# Patient Record
Sex: MALE | Race: WHITE | NOT HISPANIC OR LATINO | Employment: OTHER | URBAN - METROPOLITAN AREA
[De-identification: names, ages, dates, MRNs, and addresses within clinical notes are randomized per-mention and may not be internally consistent; named-entity substitution may affect disease eponyms.]

---

## 2017-01-06 ENCOUNTER — ANTICOAGULATION MONITORING (OUTPATIENT)
Dept: VASCULAR LAB | Facility: MEDICAL CENTER | Age: 82
End: 2017-01-06

## 2017-01-06 DIAGNOSIS — I27.82 CHRONIC SEPTIC PULMONARY EMBOLISM WITH ACUTE COR PULMONALE (HCC): ICD-10-CM

## 2017-01-06 DIAGNOSIS — I26.01 CHRONIC SEPTIC PULMONARY EMBOLISM WITH ACUTE COR PULMONALE (HCC): ICD-10-CM

## 2017-01-06 DIAGNOSIS — I63.00 CEREBROVASCULAR ACCIDENT (CVA) DUE TO THROMBOSIS OF PRECEREBRAL ARTERY (HCC): ICD-10-CM

## 2017-01-06 LAB — INR PPP: 2.6 (ref 2–3.5)

## 2017-01-06 NOTE — PROGRESS NOTES
Anticoagulation Summary as of 1/6/2017     INR goal 2.0-3.0   Selected INR 2.6 (1/6/2017)   Maintenance plan 10 mg (5 mg x 2) on Wed, Sat; 7.5 mg (7.5 mg x 1) all other days   Weekly total 57.5 mg   Plan last modified Susy Dominique, A.P.N. (6/16/2015)   Next INR check 1/20/2017   Target end date Indefinite    Indications   Pulmonary embolism (HCC) [I26.99]  Stroke [434.91] [I63.9]         Anticoagulation Episode Summary     INR check location     Preferred lab     Send INR reminders to     Maame Ceja SA      Anticoagulation Care Providers     Provider Role Specialty Phone number    Adolfo Nguyen M.D. Referring Cardiology [phone]    Susy Dominique A.P.N. Responsible Cardiology [phone]    CHRISTIANO Darling.P.NBradley Responsible Cardiology [phone]        Spoke with Michael to report a therapeutic INR of 2.6. Pt is to continue with current warfarin dosing regimen.  Pt denies any unusual s/s of bleeding, bruising, clotting or any changes to diet or medications.  Follow up in 2 weeks.    Evie Barron, PHARMD

## 2017-01-16 DIAGNOSIS — Z79.899 HIGH RISK MEDICATION USE: ICD-10-CM

## 2017-01-16 DIAGNOSIS — E78.00 PURE HYPERCHOLESTEROLEMIA: ICD-10-CM

## 2017-01-16 DIAGNOSIS — R07.9 CHEST PAIN, UNSPECIFIED TYPE: ICD-10-CM

## 2017-01-17 ENCOUNTER — TELEPHONE (OUTPATIENT)
Dept: VASCULAR LAB | Facility: MEDICAL CENTER | Age: 82
End: 2017-01-17

## 2017-01-17 DIAGNOSIS — Z79.01 LONG TERM CURRENT USE OF ANTICOAGULANT THERAPY: ICD-10-CM

## 2017-01-17 RX ORDER — WARFARIN SODIUM 5 MG/1
TABLET ORAL
Qty: 90 TAB | Refills: 3 | Status: SHIPPED | OUTPATIENT
Start: 2017-01-17 | End: 2019-02-26 | Stop reason: SDUPTHER

## 2017-01-17 RX ORDER — WARFARIN SODIUM 7.5 MG/1
TABLET ORAL
Qty: 90 TAB | Refills: 3 | Status: SHIPPED | OUTPATIENT
Start: 2017-01-17 | End: 2018-01-19 | Stop reason: SDUPTHER

## 2017-01-17 NOTE — TELEPHONE ENCOUNTER
Received message from cardiology that pt is requesting 90 day supplies of both warfarin 5mg and 7.5mg tablets via [facility].  This was completed today.    KVNG Trimble  [facility]

## 2017-01-19 ENCOUNTER — OFFICE VISIT (OUTPATIENT)
Dept: CARDIOLOGY | Facility: MEDICAL CENTER | Age: 82
End: 2017-01-19
Payer: MEDICARE

## 2017-01-19 VITALS
BODY MASS INDEX: 32.34 KG/M2 | DIASTOLIC BLOOD PRESSURE: 70 MMHG | HEIGHT: 71 IN | SYSTOLIC BLOOD PRESSURE: 100 MMHG | HEART RATE: 76 BPM | OXYGEN SATURATION: 92 % | WEIGHT: 231 LBS

## 2017-01-19 DIAGNOSIS — R07.2 PRECORDIAL PAIN: ICD-10-CM

## 2017-01-19 DIAGNOSIS — I67.9 CEREBROVASCULAR DISEASE: ICD-10-CM

## 2017-01-19 DIAGNOSIS — D68.51 FACTOR V LEIDEN (HCC): ICD-10-CM

## 2017-01-19 DIAGNOSIS — I25.9 ISCHEMIC HEART DISEASE: ICD-10-CM

## 2017-01-19 LAB
ALBUMIN SERPL-MCNC: 4.2 G/DL (ref 3.5–4.7)
ALBUMIN/GLOB SERPL: 1.7 {RATIO} (ref 1.1–2.5)
ALP SERPL-CCNC: 28 IU/L (ref 39–117)
ALT SERPL-CCNC: 18 IU/L (ref 0–44)
AST SERPL-CCNC: 18 IU/L (ref 0–40)
BILIRUB SERPL-MCNC: 0.4 MG/DL (ref 0–1.2)
BUN SERPL-MCNC: 21 MG/DL (ref 8–27)
BUN/CREAT SERPL: 19 (ref 10–22)
CALCIUM SERPL-MCNC: 9.6 MG/DL (ref 8.6–10.2)
CHLORIDE SERPL-SCNC: 102 MMOL/L (ref 96–106)
CHOLEST SERPL-MCNC: 174 MG/DL (ref 100–199)
CO2 SERPL-SCNC: 26 MMOL/L (ref 18–29)
COMMENT 011824: ABNORMAL
CREAT SERPL-MCNC: 1.13 MG/DL (ref 0.76–1.27)
GLOBULIN SER CALC-MCNC: 2.5 G/DL (ref 1.5–4.5)
GLUCOSE SERPL-MCNC: 101 MG/DL (ref 65–99)
HDLC SERPL-MCNC: 38 MG/DL
LDLC SERPL CALC-MCNC: 109 MG/DL (ref 0–99)
POTASSIUM SERPL-SCNC: 4.3 MMOL/L (ref 3.5–5.2)
PROT SERPL-MCNC: 6.7 G/DL (ref 6–8.5)
SODIUM SERPL-SCNC: 142 MMOL/L (ref 134–144)
TRIGL SERPL-MCNC: 134 MG/DL (ref 0–149)
VLDLC SERPL CALC-MCNC: 27 MG/DL (ref 5–40)

## 2017-01-19 PROCEDURE — 99214 OFFICE O/P EST MOD 30 MIN: CPT | Performed by: INTERNAL MEDICINE

## 2017-01-19 PROCEDURE — 93000 ELECTROCARDIOGRAM COMPLETE: CPT | Performed by: INTERNAL MEDICINE

## 2017-01-19 RX ORDER — POTASSIUM CHLORIDE 750 MG/1
10 TABLET, FILM COATED, EXTENDED RELEASE ORAL DAILY
COMMUNITY
End: 2017-12-14

## 2017-01-19 RX ORDER — HYDROCHLOROTHIAZIDE 25 MG/1
25 TABLET ORAL DAILY
COMMUNITY
End: 2017-12-14

## 2017-01-19 ASSESSMENT — ENCOUNTER SYMPTOMS
HALLUCINATIONS: 0
PALPITATIONS: 0
SPEECH CHANGE: 0
FALLS: 0
BLURRED VISION: 0
WEIGHT LOSS: 0
VOMITING: 0
NAUSEA: 0
BRUISES/BLEEDS EASILY: 0
DIZZINESS: 0
CLAUDICATION: 0
LOSS OF CONSCIOUSNESS: 0
BLOOD IN STOOL: 0
PND: 0
COUGH: 0
ABDOMINAL PAIN: 0
SENSORY CHANGE: 0
SHORTNESS OF BREATH: 0
CHILLS: 0
DOUBLE VISION: 0
MYALGIAS: 0
ORTHOPNEA: 0
HEADACHES: 0
DEPRESSION: 0
EYE PAIN: 0
FEVER: 0
EYE DISCHARGE: 0

## 2017-01-19 NOTE — PROGRESS NOTES
Subjective:   Michael Lockhart is a 81 y.o. male who presents today for cardiac care of CAD (MI in 2003), HTN, HLP, Factor V Leiden, history of PE.     In the interim, patient has been feeling some chest pain at random times. Pressure-like sensation. No shortness of breath.    Still chronic dizziness due to Meniere's disease.     TRANSTHORACIC ECHOCARDIOGRAM 2014:  CONCLUSIONS  Normal left ventricular systolic function.  Moderate left ventricular hypertrophy.  Grade II diastolic dysfunction.  Moderately dilated right atrium.  Trace mitral regurgitation.  Aortic annular calcification.  Mild tricuspid regurgitation.        Past Medical History   Diagnosis Date   • Ischemic heart disease    • Pulmonary embolism (CMS-HCC)      Complication of cardiac catherization   • Chest pain, unspecified      History of recurrent chest pain withput evidence of acive ischemia by thallium   • Long term (current) use of anticoagulants    • Primary hypercoagulable state (CMS-HCC)      History of positive Factor V Leiden   • Other general symptoms      Fatigue with low blood pressure   • Headache(784.0)      Chronic   • Herpes zoster without mention of complication    • Pure hypercholesterolemia    • Other abnormal glucose    • Rotator cuff (capsule) sprain    • Acute, but ill-defined, cerebrovascular disease    • CAD (coronary artery disease)    • Ischemic cardiomyopathy    • Valvular heart disease      History reviewed. No pertinent past surgical history.  History reviewed. No pertinent family history.  History   Smoking status   • Never Smoker    Smokeless tobacco   • Never Used     Allergies   Allergen Reactions   • Finasteride Swelling     Hand swelling, stopped medications after taking for three days   • Zetia [Ezetimibe]      N/V     Outpatient Encounter Prescriptions as of 1/19/2017   Medication Sig Dispense Refill   • hydrochlorothiazide (HYDRODIURIL) 25 MG Tab Take 25 mg by mouth every day.     • potassium chloride ER (KLOR-CON)  "10 MEQ tablet Take 10 mEq by mouth every day.     • warfarin (COUMADIN) 5 MG Tab Take 1-2 tabs po q day or as directed by clinic 90 Tab 3   • warfarin (COUMADIN) 7.5 MG Tab Take 1 Tab by mouth every day or as directed by clinic 90 Tab 3   • pravastatin (PRAVACHOL) 20 MG Tab Take 1 Tab by mouth 2 Times a Day. (Patient taking differently: Take 20 mg by mouth every day. (Reduced to 1 tab for one month now, eating coconut oil daily)) 180 Tab 2   • lisinopril (PRINIVIL) 5 MG TABS Take 5 mg by mouth as needed.     • gabapentin (NEURONTIN) 100 MG CAPS Take 200 mg by mouth 2 Times a Day.     • vitamin D (CHOLECALCIFEROL) 1000 UNIT TABS Take 5,000 Units by mouth every day.     • B Complex Vitamins (VITAMIN B COMPLEX PO) Take 1 Tab by mouth every day.     • tamsulosin (FLOMAX) 0.4 MG capsule Take 0.4 mg by mouth every bedtime.       No facility-administered encounter medications on file as of 1/19/2017.     Review of Systems   Constitutional: Negative for fever, chills, weight loss and malaise/fatigue.   HENT: Negative for ear discharge, ear pain, hearing loss and nosebleeds.    Eyes: Negative for blurred vision, double vision, pain and discharge.   Respiratory: Negative for cough and shortness of breath.    Cardiovascular: Negative for chest pain, palpitations, orthopnea, claudication, leg swelling and PND.   Gastrointestinal: Negative for nausea, vomiting, abdominal pain, blood in stool and melena.   Genitourinary: Negative for dysuria and hematuria.   Musculoskeletal: Negative for myalgias, joint pain and falls.   Skin: Negative for itching and rash.   Neurological: Negative for dizziness, sensory change, speech change, loss of consciousness and headaches.   Endo/Heme/Allergies: Negative for environmental allergies. Does not bruise/bleed easily.   Psychiatric/Behavioral: Negative for depression, suicidal ideas and hallucinations.        Objective:   /70 mmHg  Pulse 76  Ht 1.803 m (5' 10.98\")  Wt 104.781 kg (231 " lb)  BMI 32.23 kg/m2  SpO2 92%    Physical Exam   Constitutional: He is oriented to person, place, and time. No distress.   HENT:   Head: Normocephalic and atraumatic.   Eyes: EOM are normal.   Neck: Normal range of motion. No JVD present.   Cardiovascular: Normal rate, regular rhythm, normal heart sounds and intact distal pulses.  Exam reveals no gallop and no friction rub.    No murmur heard.  Bilateral femoral pulses are 2+, bilateral dorsalis pedis pulses are 2+, bilateral posterior tibialis pulses are 2+.   Pulmonary/Chest: No respiratory distress. He has no wheezes. He has no rales. He exhibits no tenderness.   Abdominal: Soft. Bowel sounds are normal. There is no tenderness. There is no rebound and no guarding.   The is no presence of abdominal bruits   Musculoskeletal: Normal range of motion. He exhibits no edema or tenderness.   Neurological: He is alert and oriented to person, place, and time.   Skin: Skin is warm and dry.   Psychiatric: He has a normal mood and affect.   Nursing note and vitals reviewed.      Assessment:     1. Ischemic heart disease  PET SCAN MYOCARDIAL PERFUSION    [facility] EP[facility]ANY EKG (Clinic Performed)   2. Factor V Leiden (CMS-HCC)  PET SCAN MYOCARDIAL PERFUSION    [facility] EKG (Clinic Performed)   3. Cerebrovascular disease  PET SCAN MYOCARDIAL PERFUSION    [facility] EP[facility]ANY EKG (Clinic Performed)   4. Precordial pain  PET SCAN MYOCARDIAL PERFUSION    [facility] EKG (Clinic Performed)       Medical Decision Making:  Today's Assessment / Status / Plan:     I think at this time, we should further investigate with a PET myocardial stress test to assess for possible coronary ischemia.    His EKG today does not show evidence of acute coronary syndrome. I reviewed with him.    In the meantime, continue current medical therapy. Blood pressure is well controlled.

## 2017-01-19 NOTE — Clinical Note
[facility]   [address]  [place] [address]  Phone: [phone]  Fax: [phone]              Michael Lockhart  1935    Encounter Date: 1/19/2017    Cal Luong M.D.          PROGRESS NOTE:  Subjective:   Michael Lockhart is a 81 y.o. male who presents today for cardiac care of CAD (MI in 2003), HTN, HLP, Factor V Leiden, history of PE.     In the interim, patient has been feeling some chest pain at random times. Pressure-like sensation. No shortness of breath.    Still chronic dizziness due to Meniere's disease.     TRANSTHORACIC ECHOCARDIOGRAM 2014:  CONCLUSIONS  Normal left ventricular systolic function.  Moderate left ventricular hypertrophy.  Grade II diastolic dysfunction.  Moderately dilated right atrium.  Trace mitral regurgitation.  Aortic annular calcification.  Mild tricuspid regurgitation.        Past Medical History   Diagnosis Date   • Ischemic heart disease    • Pulmonary embolism (CMS-HCC)      Complication of cardiac catherization   • Chest pain, unspecified      History of recurrent chest pain withput evidence of acive ischemia by thallium   • Long term (current) use of anticoagulants    • Primary hypercoagulable state (CMS-HCC)      History of positive Factor V Leiden   • Other general symptoms      Fatigue with low blood pressure   • Headache(784.0)      Chronic   • Herpes zoster without mention of complication    • Pure hypercholesterolemia    • Other abnormal glucose    • Rotator cuff (capsule) sprain    • Acute, but ill-defined, cerebrovascular disease    • CAD (coronary artery disease)    • Ischemic cardiomyopathy    • Valvular heart disease      History reviewed. No pertinent past surgical history.  History reviewed. No pertinent family history.  History   Smoking status   • Never Smoker    Smokeless tobacco   • Never Used     Allergies   Allergen Reactions   • Finasteride Swelling     Hand swelling, stopped medications  after taking for three days   • Zetia [Ezetimibe]      N/V     Outpatient Encounter Prescriptions as of 1/19/2017   Medication Sig Dispense Refill   • hydrochlorothiazide (HYDRODIURIL) 25 MG Tab Take 25 mg by mouth every day.     • potassium chloride ER (KLOR-CON) 10 MEQ tablet Take 10 mEq by mouth every day.     • warfarin (COUMADIN) 5 MG Tab Take 1-2 tabs po q day or as directed by clinic 90 Tab 3   • warfarin (COUMADIN) 7.5 MG Tab Take 1 Tab by mouth every day or as directed by clinic 90 Tab 3   • pravastatin (PRAVACHOL) 20 MG Tab Take 1 Tab by mouth 2 Times a Day. (Patient taking differently: Take 20 mg by mouth every day. (Reduced to 1 tab for one month now, eating coconut oil daily)) 180 Tab 2   • lisinopril (PRINIVIL) 5 MG TABS Take 5 mg by mouth as needed.     • gabapentin (NEURONTIN) 100 MG CAPS Take 200 mg by mouth 2 Times a Day.     • vitamin D (CHOLECALCIFEROL) 1000 UNIT TABS Take 5,000 Units by mouth every day.     • B Complex Vitamins (VITAMIN B COMPLEX PO) Take 1 Tab by mouth every day.     • tamsulosin (FLOMAX) 0.4 MG capsule Take 0.4 mg by mouth every bedtime.       No facility-administered encounter medications on file as of 1/19/2017.     Review of Systems   Constitutional: Negative for fever, chills, weight loss and malaise/fatigue.   HENT: Negative for ear discharge, ear pain, hearing loss and nosebleeds.    Eyes: Negative for blurred vision, double vision, pain and discharge.   Respiratory: Negative for cough and shortness of breath.    Cardiovascular: Negative for chest pain, palpitations, orthopnea, claudication, leg swelling and PND.   Gastrointestinal: Negative for nausea, vomiting, abdominal pain, blood in stool and melena.   Genitourinary: Negative for dysuria and hematuria.   Musculoskeletal: Negative for myalgias, joint pain and falls.   Skin: Negative for itching and rash.   Neurological: Negative for dizziness, sensory change, speech change, loss of consciousness and headaches.    "  Endo/Heme/Allergies: Negative for environmental allergies. Does not bruise/bleed easily.   Psychiatric/Behavioral: Negative for depression, suicidal ideas and hallucinations.        Objective:   /70 mmHg  Pulse 76  Ht 1.803 m (5' 10.98\")  Wt 104.781 kg (231 lb)  BMI 32.23 kg/m2  SpO2 92%    Physical Exam   Constitutional: He is oriented to person, place, and time. No distress.   HENT:   Head: Normocephalic and atraumatic.   Eyes: EOM are normal.   Neck: Normal range of motion. No JVD present.   Cardiovascular: Normal rate, regular rhythm, normal heart sounds and intact distal pulses.  Exam reveals no gallop and no friction rub.    No murmur heard.  Bilateral femoral pulses are 2+, bilateral dorsalis pedis pulses are 2+, bilateral posterior tibialis pulses are 2+.   Pulmonary/Chest: No respiratory distress. He has no wheezes. He has no rales. He exhibits no tenderness.   Abdominal: Soft. Bowel sounds are normal. There is no tenderness. There is no rebound and no guarding.   The is no presence of abdominal bruits   Musculoskeletal: Normal range of motion. He exhibits no edema or tenderness.   Neurological: He is alert and oriented to person, place, and time.   Skin: Skin is warm and dry.   Psychiatric: He has a normal mood and affect.   Nursing note and vitals reviewed.      Assessment:     1. Ischemic heart disease  PET SCAN MYOCARDIAL PERFUSION    [facility] EPIPHANY EKG (Clinic Performed)   2. Factor V Leiden (CMS-HCC)  PET SCAN MYOCARDIAL PERFUSION    [facility] EPIPHANY EKG (Clinic Performed)   3. Cerebrovascular disease  PET SCAN MYOCARDIAL PERFUSION    [facility] EPIPHANY EKG (Clinic Performed)   4. Precordial pain  PET SCAN MYOCARDIAL PERFUSION    [facility] EPIPHANY EKG (Clinic Performed)       Medical Decision Making:  Today's Assessment / Status / Plan:     I think at this time, we should further investigate with a PET myocardial stress test to assess for possible coronary ischemia.    His EKG today does not show evidence " of acute coronary syndrome. I reviewed with him.    In the meantime, continue current medical therapy. Blood pressure is well controlled.      Delores Michael M.D.  [address]  [address]  [place] [address]  VIA Facsimile: [phone]

## 2017-01-19 NOTE — MR AVS SNAPSHOT
"        Michael Lockhart   2017 10:45 AM   Office Visit   MRN: [id number]    Department:  [facility]   Dept Phone:  [phone]    Description:  Male : 1935   Provider:  Cal Luong M.D.           Reason for Visit     Follow-Up C/O chest pain on the left side, that waxes and wanes, sharp quit pain.       Allergies as of 2017     Allergen Noted Reactions    Finasteride 2017   Swelling    Hand swelling, stopped medications after taking for three days    Zetia [Ezetimibe] 2012       N/V      You were diagnosed with     Ischemic heart disease   [395458]       Factor V Leiden (CMS-[facility])   [502203]       Cerebrovascular disease   [153045]       Precordial pain   [786.51.ICD-9-CM]         Vital Signs     Blood Pressure Pulse Height Weight Body Mass Index Oxygen Saturation    100/70 mmHg 76 1.803 m (5' 10.98\") 104.781 kg (231 lb) 32.23 kg/m2 92%    Smoking Status                   Never Smoker            Basic Information     Date Of Birth Sex Race Ethnicity Preferred Language    1935 Male White Non- English      Your appointments     May 17, 2017  9:00 AM   FOLLOW UP with Cal Luong M.D.   [facility] (--)    [address] [place] [id number] [phone]              Problem List              ICD-10-CM Priority Class Noted - Resolved    Ischemic heart disease I25.9 High  Unknown - Present    Pulmonary embolism (CMS-[facility]) I26.99 High  Unknown - Present    Chest pain R07.9 Medium  Unknown - Present    Long term current use of anticoagulant therapy Z79.01 High  Unknown - Present    Primary hypercoagulable state (CMS-[facility]) D68.59 High  Unknown - Present    Other general symptoms 780.9   Unknown - Present    Headache R51   Unknown - Present    Herpes zoster B02.9 Low  Unknown - Present    Pure hypercholesterolemia E78.00 High  Unknown - Present    Other abnormal glucose R73.09 High  Unknown - Present    Rotator cuff " (capsule) sprain S43.429A   Unknown - Present    Other chest pain R07.89   4/11/2012 - Present    Tremor R25.1   7/1/2013 - Present    Cervical radicular pain M54.12   12/14/2013 - Present    Peripheral neuropathy (CMS-HCC) G62.9   12/14/2013 - Present    Spell of dizziness R42   12/14/2013 - Present    Cerebrovascular disease I67.9   1/3/2014 - Present    Dizziness R42   6/30/2014 - Present    Headache R51   7/2/2014 - Present    Hypotension I95.9   7/2/2014 - Present    Anxiety F41.9   7/2/2014 - Present    BPH (benign prostatic hyperplasia) N40.0   7/2/2014 - Present    Hypercoagulable state (CMS-HCC) D68.59   7/2/2014 - Present    Subtherapeutic international normalized ratio (INR) R79.1   7/2/2014 - Present    Meniere's disease H81.09   7/2/2014 - Present    Stroke [434.91] I63.9   5/13/2015 - Present      Health Maintenance        Date Due Completion Dates    IMM DTaP/Tdap/Td Vaccine (1 - Tdap) 3/4/1955 ---    COLONOSCOPY 3/4/1986 ---    IMM ZOSTER VACCINE 3/4/1996 ---    IMM PNEUMOCOCCAL 65+ (ADULT) LOW/MEDIUM RISK SERIES (2 of 2 - PCV13) 1/1/2007 1/1/2006    IMM INFLUENZA (1) 9/1/2016 10/16/2013            Results       Current Immunizations     Influenza TIV (IM) 10/16/2013    Pneumococcal polysaccharide vaccine (PPSV-23) 1/1/2006      Below and/or attached are the medications your provider expects you to take. Review all of your home medications and newly ordered medications with your provider and/or pharmacist. Follow medication instructions as directed by your provider and/or pharmacist. Please keep your medication list with you and share with your provider. Update the information when medications are discontinued, doses are changed, or new medications (including over-the-counter products) are added; and carry medication information at all times in the event of emergency situations     Allergies:  FINASTERIDE - Swelling     ZETIA - (reactions not documented)               Medications  Valid as of:  January 19, 2017 - 11:27 AM    Generic Name Brand Name Tablet Size Instructions for use    B Complex Vitamins   Take 1 Tab by mouth every day.        Cholecalciferol (Tab) cholecalciferol 1000 UNIT Take 5,000 Units by mouth every day.        Gabapentin (Cap) NEURONTIN 100 MG Take 200 mg by mouth 2 Times a Day.        HydroCHLOROthiazide (Tab) HYDRODIURIL 25 MG Take 25 mg by mouth every day.        Lisinopril (Tab) PRINIVIL 5 MG Take 5 mg by mouth as needed.        Potassium Chloride (Tab CR) KLOR-CON 10 MEQ Take 10 mEq by mouth every day.        Pravastatin Sodium (Tab) PRAVACHOL 20 MG Take 1 Tab by mouth 2 Times a Day.        Tamsulosin HCl (Cap) FLOMAX 0.4 MG Take 0.4 mg by mouth every bedtime.        Warfarin Sodium (Tab) COUMADIN 5 MG Take 1-2 tabs po q day or as directed by clinic        Warfarin Sodium (Tab) COUMADIN 7.5 MG Take 1 Tab by mouth every day or as directed by clinic        .                 Medicines prescribed today were sent to:     [facility] [address] [address]    Phone: [phone] Fax: [phone]    Open 24 Hours?: No    [facility] [address] [place] [address]    Phone: [phone] Fax: [phone]    Open 24 Hours?: No      Medication refill instructions:       If your prescription bottle indicates you have medication refills left, it is not necessary to call your provider’s office. Please contact your pharmacy and they will refill your medication.    If your prescription bottle indicates you do not have any refills left, you may request refills at any time through one of the following ways: The online [employer] system (except Urgent Care), by calling your provider’s office, or by asking your pharmacy to contact your provider’s office with a refill request. Medication refills are processed only during regular business hours and may not be  available until the next business day. Your provider may request additional information or to have a follow-up visit with you prior to refilling your medication.   *Please Note: Medication refills are assigned a new Rx number when refilled electronically. Your pharmacy may indicate that no refills were authorized even though a new prescription for the same medication is available at the pharmacy. Please request the medicine by name with the pharmacy before contacting your provider for a refill.           BriefCam Access Code: [email or web address]  Expires: 2/18/2017 11:27 AM    BriefCam  A secure, online tool to manage your health information     [employer] BriefCam® is a secure, online tool that connects you to your personalized health information from the privacy of your home -- day or night - making it very easy for you to manage your healthcare. Once the activation process is completed, you can even access your medical information using the BriefCam abdoulaye, which is available for free in the Apple Abd[employer]aye store or Google Play store.     BriefCam provides the following levels of access (as shown below):   My Chart Features   [facility]  Specialists [facility]  Urgent  Care Non-[facility]  Primary Care  Doctor   Email your healthcare team securely and privately 24/7 X X X    Manage appointments: schedule your next appointment; view details of past/upcoming appointments X      Request prescription refills. X      View recent personal medical records, including lab and immunizations X X X X   View health record, including health history, allergies, medications X X X X   Read reports about your outpatient visits, procedures, consult and ER notes X X X X   See your discharge summary, which is a recap of your hospital and/or ER visit that includes your diagnosis, lab results, and care plan. X X       How to register for BriefCam:  1. Go to  [email or web address].  2. Click on the Sign Up Now box, which takes  you to the New Member Sign Up page. You will need to provide the following information:  a. Enter your eCardio Access Code exactly as it appears at the top of this page. (You will not need to use this code after you’ve completed the sign-up process. If you do not sign up before the expiration date, you must request a new code.)   b. Enter your date of birth.   c. Enter your home email address.   d. Click Submit, and follow the next screen’s instructions.  3. Create a eCardio ID. This will be your eCardio login ID and cannot be changed, so think of one that is secure and easy to remember.  4. Create a eCardio password. You can change your password at any time.  5. Enter your Password Reset Question and Answer. This can be used at a later time if you forget your password.   6. Enter your e-mail address. This allows you to receive e-mail notifications when new information is available in eCardio.  7. Click Sign Up. You can now view your health information.    For assistance activating your eCardio account, call [phone]

## 2017-01-20 LAB — EKG IMPRESSION: NORMAL

## 2017-02-01 ENCOUNTER — HOSPITAL ENCOUNTER (OUTPATIENT)
Dept: CARDIOLOGY | Facility: MEDICAL CENTER | Age: 82
End: 2017-02-01
Attending: INTERNAL MEDICINE
Payer: MEDICARE

## 2017-02-01 DIAGNOSIS — R07.2 PRECORDIAL PAIN: ICD-10-CM

## 2017-02-01 DIAGNOSIS — I67.9 CEREBROVASCULAR DISEASE: ICD-10-CM

## 2017-02-01 DIAGNOSIS — I25.9 ISCHEMIC HEART DISEASE: ICD-10-CM

## 2017-02-01 DIAGNOSIS — D68.51 FACTOR V LEIDEN (HCC): ICD-10-CM

## 2017-02-01 PROCEDURE — 93017 CV STRESS TEST TRACING ONLY: CPT

## 2017-02-01 PROCEDURE — 700111 HCHG RX REV CODE 636 W/ 250 OVERRIDE (IP)

## 2017-02-01 PROCEDURE — A9555 RB82 RUBIDIUM: HCPCS

## 2017-02-01 PROCEDURE — 78492 MYOCRD IMG PET MLT RST&STRS: CPT

## 2017-02-02 NOTE — PROCEDURES
REFERRING PHYSICIAN:  Rene Luong MD    Age:  81.  GENDER:  Male.  HEIGHT:  5 feet 11 inches.  Weight:  231 pounds.    INDICATIONS:  Chest pain, coronary artery disease, or old myocardial   infarction.    PROCEDURE:  The patient reviewed and signed the acknowledgement for testing   form.  The patient was in a fasting state and was properly prepared for   testing.  An intravenous line was inserted and a flush of normal saline   followed to insure line patency.    A transmission scan was acquired for attenuation correction using the internal   Germanium sources.  The patient was then administered 25.2 mCi of   Rubidium-82.  Approximately 90 seconds after the infusion, resting imagine   were obtained with ECG-gating.  Following the resting series, the patient   administered 60.0 mg of dipyridamole over four minutes.  The blood pressure,   heart rate and ECG were monitored and recorded.  After the dipyridamole   infusion was completed, another transmission scan for attenuation correction   was obtained.  The patient was then administered 25.1 mCi of Rubidium-82.    Approximately 90 seconds after the infusion, Peak stress images were obtained   with ECG-gating.    CLINICAL RESPONSE:  Resting blood pressure was 142/90 with a heart rate of 60.    Immediately post-dipyridamole infusion the blood pressure was 114/64 with a   heart rate of 87.  After a recovery period the blood pressure was 103/53 with   a heart rate of 72.    The patient experienced flushed, headache, intermittent AV block, and chest   pressure.  No symptoms during testing.    Aminophylline 100 mg was administered following the scan.    ELECTROCARDIOGRAPHIC FINDINGS:  At rest patient has sinus rhythm.  With   dipyridamole infusion, first degree AV block was noted, no evidence of   ischemia.    SCINTOGRAPHIC FINDINGS:  The QC data for the scan was reviewed and was within   acceptable limits.  In both stress and rest imaging, there is normal   myocardial  perfusion.  No evidence of ischemia or infarction.    GATED WALL MOTION FINDINGS:  By gated test, there is normal regional wall   motion with a measured ejection fraction of 70%.    CONCLUSIONS AND IMPRESSIONS:  Negative or normal PET perfusion imaging for   ischemia.  No evidence of ischemia or infarction.  Normal wall motion and   resting ejection fraction.       ____________________________________     MD ROLF Juan / DONNA    DD:  02/01/2017 21:42:00  DT:  02/02/2017 02:09:54    D#:  [id number]  Job#:  [id number]    cc: VALDEZ ANGELA MD, Rene Luong MD

## 2017-02-06 ENCOUNTER — TELEPHONE (OUTPATIENT)
Dept: CARDIOLOGY | Facility: MEDICAL CENTER | Age: 82
End: 2017-02-06

## 2017-02-20 ENCOUNTER — ANTICOAGULATION MONITORING (OUTPATIENT)
Dept: VASCULAR LAB | Facility: MEDICAL CENTER | Age: 82
End: 2017-02-20

## 2017-02-20 DIAGNOSIS — I63.00 CEREBROVASCULAR ACCIDENT (CVA) DUE TO THROMBOSIS OF PRECEREBRAL ARTERY (HCC): ICD-10-CM

## 2017-02-20 DIAGNOSIS — I27.82 CHRONIC SEPTIC PULMONARY EMBOLISM WITH ACUTE COR PULMONALE (HCC): ICD-10-CM

## 2017-02-20 DIAGNOSIS — I26.01 CHRONIC SEPTIC PULMONARY EMBOLISM WITH ACUTE COR PULMONALE (HCC): ICD-10-CM

## 2017-02-20 LAB — INR PPP: 2.2 (ref 2–3.5)

## 2017-02-20 NOTE — PROGRESS NOTES
OP Anticoagulation Telephone Note    Date: 2/20/2017  Anticoagulation Summary as of 2/20/2017     INR goal 2.0-3.0   Selected INR 2.2 (2/19/2017)   Maintenance plan 10 mg (5 mg x 2) on Wed, Sat; 7.5 mg (7.5 mg x 1) all other days   Weekly total 57.5 mg   No change documented Dayan Currie, Med Ass't   Plan last modified Susy Dominique A.P.N. (6/16/2015)   Next INR check 3/6/2017   Target end date Indefinite    Indications   Pulmonary embolism (CMS-HCC) [I26.99]  Stroke [434.91] [I63.9]         Anticoagulation Episode Summary     INR check location     Preferred lab     Send INR reminders to     Maame Ceja SA      Anticoagulation Care Providers     Provider Role Specialty Phone number    Adolfo Nguyen M.D. Referring Cardiology [phone]    Susy Dominique A.P.N. Responsible Cardiology [phone]    SJ DarlingP.NBradley Responsible Cardiology [phone]        Anticoagulation Patient Findings   Negatives Missed Doses, Extra Doses, Medication Changes, Antibiotic Use, Diet Changes, Dental/Other Procedures, Hospitalization, Bleeding Gums, Nose Bleeds, Blood in Urine, Blood in Stool, Any Bruising, Other Complaints      Plan:  Left message on patient's answering machine/ voicemail. Instructed patient to call back with any concerns regarding any unusual bleeding or bruising, any medication or diet changes, or any signs or symptoms of thrombosis. Instructed patient to resume medication as outlined above. Patient to follow up in 2 weeks.     Dayan Currie, Medical Assistant    Agree with plan of care as stated above.    Susy Dominique APRN

## 2017-03-24 ENCOUNTER — ANTICOAGULATION MONITORING (OUTPATIENT)
Dept: VASCULAR LAB | Facility: MEDICAL CENTER | Age: 82
End: 2017-03-24

## 2017-03-24 DIAGNOSIS — I63.00 CEREBROVASCULAR ACCIDENT (CVA) DUE TO THROMBOSIS OF PRECEREBRAL ARTERY (HCC): ICD-10-CM

## 2017-03-24 DIAGNOSIS — I27.82 CHRONIC SEPTIC PULMONARY EMBOLISM WITH ACUTE COR PULMONALE (HCC): ICD-10-CM

## 2017-03-24 DIAGNOSIS — I26.01 CHRONIC SEPTIC PULMONARY EMBOLISM WITH ACUTE COR PULMONALE (HCC): ICD-10-CM

## 2017-03-24 LAB — INR PPP: 2.3 (ref 2–3.5)

## 2017-03-24 NOTE — PROGRESS NOTES
Anticoagulation Summary as of 3/24/2017     INR goal 2.0-3.0   Selected INR 2.3 (3/24/2017)   Maintenance plan 10 mg (5 mg x 2) on Wed, Sat; 7.5 mg (7.5 mg x 1) all other days   Weekly total 57.5 mg   Plan last modified Susy Dominique A.P.N. (6/16/2015)   Next INR check 4/21/2017   Target end date Indefinite    Indications   Pulmonary embolism (CMS-HCC) [I26.99]  Stroke [434.91] [I63.9]         Anticoagulation Episode Summary     INR check location     Preferred lab     Send INR reminders to     Maame Ceja       Anticoagulation Care Providers     Provider Role Specialty Phone number    Adolfo Nguyen M.D. Referring Cardiology [phone]    Susy Dominique A.P.N. Responsible Cardiology [phone]    SJ DarlingP.NBradley Responsible Cardiology [phone]        Anticoagulation Patient Findings   Negatives Missed Doses, Extra Doses, Medication Changes, Antibiotic Use, Diet Changes, Dental/Other Procedures, Hospitalization, Bleeding Gums, Nose Bleeds, Blood in Urine, Blood in Stool, Any Bruising, Other Complaints        Spoke with patient today regarding therapeutic INR of 2.3.  Patient denies any signs/symptoms of bruising or bleeding or any changes in diet and medications.  Instructed patient to call clinic with any questions or concerns.  Pt is to continue with current warfarin dosing regimen.  Follow up in 4 weeks.    Lamont Will, PHARMD

## 2017-04-27 LAB — INR PPP: 3 (ref 2–3.5)

## 2017-04-28 ENCOUNTER — ANTICOAGULATION MONITORING (OUTPATIENT)
Dept: VASCULAR LAB | Facility: MEDICAL CENTER | Age: 82
End: 2017-04-28

## 2017-04-28 DIAGNOSIS — I63.00 CEREBROVASCULAR ACCIDENT (CVA) DUE TO THROMBOSIS OF PRECEREBRAL ARTERY (HCC): ICD-10-CM

## 2017-04-28 DIAGNOSIS — I26.01 CHRONIC SEPTIC PULMONARY EMBOLISM WITH ACUTE COR PULMONALE (HCC): ICD-10-CM

## 2017-04-28 DIAGNOSIS — I27.82 CHRONIC SEPTIC PULMONARY EMBOLISM WITH ACUTE COR PULMONALE (HCC): ICD-10-CM

## 2017-04-28 NOTE — PROGRESS NOTES
Anticoagulation Summary as of 4/28/2017     INR goal 2.0-3.0   Selected INR 3 (4/27/2017)   Maintenance plan 10 mg (5 mg x 2) on Wed, Sat; 7.5 mg (7.5 mg x 1) all other days   Weekly total 57.5 mg   No change documented Gisselle Pires, Med Ass't   Plan last modified Susy Dominique, A.P.N. (6/16/2015)   Next INR check 5/25/2017   Target end date Indefinite    Indications   Pulmonary embolism (CMS-HCC) [I26.99]  Stroke [434.91] [I63.9]         Anticoagulation Episode Summary     INR check location     Preferred lab     Send INR reminders to     Maame Ceja SA      Anticoagulation Care Providers     Provider Role Specialty Phone number    Adolfo Nguyen M.D. Referring Cardiology [phone]    Susy Dominique A.P.N. Responsible Cardiology [phone]    SAMUEL Darling Responsible Cardiology [phone]        Anticoagulation Patient Findings   Negatives Missed Doses, Extra Doses, Medication Changes, Antibiotic Use, Diet Changes, Dental/Other Procedures, Hospitalization, Bleeding Gums, Nose Bleeds, Blood in Urine, Blood in Stool, Any Bruising, Other Complaints        Spoke with patient to report a therapeutic INR.  Pt instructed to continue with current warfarin dosing regimen. Pt denies any s/s of bleeding, bruising, clotting or any changes to diet or medication.  Will follow up in 4 weeks.    Gisselle Pires, Med Ass't    Agree with above plan.    KVNG Trimble

## 2017-05-08 ENCOUNTER — TELEPHONE (OUTPATIENT)
Dept: CARDIOLOGY | Facility: MEDICAL CENTER | Age: 82
End: 2017-05-08

## 2017-05-08 DIAGNOSIS — Z79.899 HIGH RISK MEDICATION USE: ICD-10-CM

## 2017-05-08 DIAGNOSIS — R73.09 OTHER ABNORMAL GLUCOSE: ICD-10-CM

## 2017-05-08 DIAGNOSIS — E78.00 PURE HYPERCHOLESTEROLEMIA: ICD-10-CM

## 2017-05-08 DIAGNOSIS — I25.9 ISCHEMIC HEART DISEASE: ICD-10-CM

## 2017-05-08 NOTE — TELEPHONE ENCOUNTER
----- Message from Rachna Courtney sent at 5/8/2017 12:13 PM PDT -----  Regarding: patient wants lab order faxed to his home  ANTHONY/Angelika      Patient would like you to fax his lab order to him at [phone]. He can be reached at [phone].

## 2017-05-16 LAB
ALBUMIN SERPL-MCNC: 4.4 G/DL (ref 3.5–4.7)
ALBUMIN/GLOB SERPL: 2 {RATIO} (ref 1.2–2.2)
ALP SERPL-CCNC: 33 IU/L (ref 39–117)
ALT SERPL-CCNC: 16 IU/L (ref 0–44)
AST SERPL-CCNC: 16 IU/L (ref 0–40)
BILIRUB SERPL-MCNC: 0.4 MG/DL (ref 0–1.2)
BUN SERPL-MCNC: 27 MG/DL (ref 8–27)
BUN/CREAT SERPL: 26 (ref 10–24)
CALCIUM SERPL-MCNC: 9.2 MG/DL (ref 8.6–10.2)
CHLORIDE SERPL-SCNC: 101 MMOL/L (ref 96–106)
CHOLEST SERPL-MCNC: 157 MG/DL (ref 100–199)
CO2 SERPL-SCNC: 22 MMOL/L (ref 18–29)
COMMENT 011824: NORMAL
CREAT SERPL-MCNC: 1.05 MG/DL (ref 0.76–1.27)
GLOBULIN SER CALC-MCNC: 2.2 G/DL (ref 1.5–4.5)
GLUCOSE SERPL-MCNC: 98 MG/DL (ref 65–99)
HDLC SERPL-MCNC: 43 MG/DL
LDLC SERPL CALC-MCNC: 94 MG/DL (ref 0–99)
POTASSIUM SERPL-SCNC: 4.1 MMOL/L (ref 3.5–5.2)
PROT SERPL-MCNC: 6.6 G/DL (ref 6–8.5)
SODIUM SERPL-SCNC: 140 MMOL/L (ref 134–144)
TRIGL SERPL-MCNC: 98 MG/DL (ref 0–149)
VLDLC SERPL CALC-MCNC: 20 MG/DL (ref 5–40)

## 2017-05-17 ENCOUNTER — HOSPITAL ENCOUNTER (OUTPATIENT)
Dept: CARDIOLOGY | Facility: MEDICAL CENTER | Age: 82
End: 2017-05-17
Attending: INTERNAL MEDICINE
Payer: MEDICARE

## 2017-05-17 ENCOUNTER — OFFICE VISIT (OUTPATIENT)
Dept: CARDIOLOGY | Facility: MEDICAL CENTER | Age: 82
End: 2017-05-17
Payer: MEDICARE

## 2017-05-17 ENCOUNTER — HOSPITAL ENCOUNTER (OUTPATIENT)
Dept: RADIOLOGY | Facility: MEDICAL CENTER | Age: 82
End: 2017-05-17
Attending: INTERNAL MEDICINE
Payer: MEDICARE

## 2017-05-17 VITALS
WEIGHT: 233 LBS | BODY MASS INDEX: 32.62 KG/M2 | SYSTOLIC BLOOD PRESSURE: 124 MMHG | HEART RATE: 74 BPM | HEIGHT: 71 IN | OXYGEN SATURATION: 94 % | DIASTOLIC BLOOD PRESSURE: 80 MMHG

## 2017-05-17 DIAGNOSIS — I26.90 CHRONIC SEPTIC PULMONARY EMBOLISM WITHOUT ACUTE COR PULMONALE (HCC): ICD-10-CM

## 2017-05-17 DIAGNOSIS — I27.82 CHRONIC SEPTIC PULMONARY EMBOLISM WITHOUT ACUTE COR PULMONALE (HCC): ICD-10-CM

## 2017-05-17 DIAGNOSIS — H81.09 MENIERE'S DISEASE, UNSPECIFIED LATERALITY: ICD-10-CM

## 2017-05-17 DIAGNOSIS — D68.59 HYPERCOAGULABLE STATE (HCC): ICD-10-CM

## 2017-05-17 DIAGNOSIS — R22.43 LOCALIZED SWELLING OF BOTH LOWER LEGS: ICD-10-CM

## 2017-05-17 DIAGNOSIS — Z79.01 LONG TERM CURRENT USE OF ANTICOAGULANT THERAPY: ICD-10-CM

## 2017-05-17 DIAGNOSIS — D68.51 FACTOR V LEIDEN (HCC): ICD-10-CM

## 2017-05-17 LAB
LV EJECT FRACT  99904: 65
LV EJECT FRACT MOD 2C 99903: 75.13
LV EJECT FRACT MOD 4C 99902: 59.45
LV EJECT FRACT MOD BP 99901: 68.17

## 2017-05-17 PROCEDURE — 1036F TOBACCO NON-USER: CPT | Performed by: INTERNAL MEDICINE

## 2017-05-17 PROCEDURE — G8598 ASA/ANTIPLAT THER USED: HCPCS | Performed by: INTERNAL MEDICINE

## 2017-05-17 PROCEDURE — 93970 EXTREMITY STUDY: CPT | Mod: 26 | Performed by: SURGERY

## 2017-05-17 PROCEDURE — 99214 OFFICE O/P EST MOD 30 MIN: CPT | Performed by: INTERNAL MEDICINE

## 2017-05-17 PROCEDURE — G8432 DEP SCR NOT DOC, RNG: HCPCS | Performed by: INTERNAL MEDICINE

## 2017-05-17 PROCEDURE — G8419 CALC BMI OUT NRM PARAM NOF/U: HCPCS | Performed by: INTERNAL MEDICINE

## 2017-05-17 PROCEDURE — 93970 EXTREMITY STUDY: CPT

## 2017-05-17 PROCEDURE — 93306 TTE W/DOPPLER COMPLETE: CPT | Mod: 26 | Performed by: INTERNAL MEDICINE

## 2017-05-17 PROCEDURE — 4040F PNEUMOC VAC/ADMIN/RCVD: CPT | Performed by: INTERNAL MEDICINE

## 2017-05-17 PROCEDURE — 1101F PT FALLS ASSESS-DOCD LE1/YR: CPT | Mod: 8P | Performed by: INTERNAL MEDICINE

## 2017-05-17 ASSESSMENT — ENCOUNTER SYMPTOMS
CLAUDICATION: 0
HALLUCINATIONS: 0
EYE DISCHARGE: 0
PND: 0
EYE PAIN: 0
CHILLS: 0
BLURRED VISION: 0
PALPITATIONS: 0
DEPRESSION: 0
SPEECH CHANGE: 0
MYALGIAS: 0
DIZZINESS: 0
BLOOD IN STOOL: 0
COUGH: 0
DOUBLE VISION: 0
SENSORY CHANGE: 0
ABDOMINAL PAIN: 0
HEADACHES: 0
VOMITING: 0
BRUISES/BLEEDS EASILY: 0
FALLS: 0
LOSS OF CONSCIOUSNESS: 0
NAUSEA: 0
SHORTNESS OF BREATH: 0
FEVER: 0
WEIGHT LOSS: 0
ORTHOPNEA: 0

## 2017-05-17 NOTE — Clinical Note
[facility]   [address], [address]  [place] [address]  Phone: [phone]  Fax: [phone]              Michael Lockhart  1935    Encounter Date: 5/17/2017    Cal Luong M.D.          PROGRESS NOTE:  Subjective:   Michael Lockhart is a 82 y.o. male who presents today for cardiac care of CAD (MI in 2003), HTN, HLP, Factor V Leiden, history of PE.     At prior visit, patient reported of feeling some chest pain at random times. Pressure-like sensation. No shortness of breath. We perform myocardial PET scan stress test was negative for coronary ischemia. LVEF on that test was about 70%.    Still chronic dizziness due to Meniere's disease.     Patient fell a few weeks ago and had a lot of ecchymosis in his right lower extremity. He is worried that he might have some blood clots in his right lower extremity.    TRANSTHORACIC ECHOCARDIOGRAM 2014:  CONCLUSIONS  Normal left ventricular systolic function.  Moderate left ventricular hypertrophy.  Grade II diastolic dysfunction.  Moderately dilated right atrium.  Trace mitral regurgitation.  Aortic annular calcification.  Mild tricuspid regurgitation.    Past Medical History   Diagnosis Date   • Ischemic heart disease    • Pulmonary embolism (CMS-[facility])      Complication of cardiac catherization   • Chest pain, unspecified      History of recurrent chest pain withput evidence of acive ischemia by thallium   • Long term (current) use of anticoagulants    • Primary hypercoagulable state (CMS-[facility])      History of positive Factor V Leiden   • Other general symptoms      Fatigue with low blood pressure   • Headache      Chronic   • Herpes zoster without mention of complication    • Pure hypercholesterolemia    • Other abnormal glucose    • Rotator cuff (capsule) sprain    • Acute, but ill-defined, cerebrovascular disease (CMS-[facility])    • CAD (coronary artery disease)    • Ischemic cardiomyopathy    • Valvular heart disease        History reviewed. No pertinent past surgical history.  Family History   Problem Relation Age of Onset   • Heart Disease Mother      History   Smoking status   • Never Smoker    Smokeless tobacco   • Never Used     Allergies   Allergen Reactions   • Finasteride Swelling     Hand swelling, stopped medications after taking for three days   • Zetia [Ezetimibe]      N/V     Outpatient Encounter Prescriptions as of 5/17/2017   Medication Sig Dispense Refill   • hydrochlorothiazide (HYDRODIURIL) 25 MG Tab Take 25 mg by mouth every day.     • warfarin (COUMADIN) 5 MG Tab Take 1-2 tabs po q day or as directed by clinic 90 Tab 3   • warfarin (COUMADIN) 7.5 MG Tab Take 1 Tab by mouth every day or as directed by clinic 90 Tab 3   • pravastatin (PRAVACHOL) 20 MG Tab Take 1 Tab by mouth 2 Times a Day. (Patient taking differently: Take 20 mg by mouth 2 Times a Day. (Reduced to 1 tab for one month now, eating coconut oil daily)) 180 Tab 2   • lisinopril (PRINIVIL) 5 MG TABS Take 5 mg by mouth as needed.     • gabapentin (NEURONTIN) 100 MG CAPS Take 200 mg by mouth 2 Times a Day.     • vitamin D (CHOLECALCIFEROL) 1000 UNIT TABS Take 5,000 Units by mouth every day.     • B Complex Vitamins (VITAMIN B COMPLEX PO) Take 1 Tab by mouth every day.     • tamsulosin (FLOMAX) 0.4 MG capsule Take 0.4 mg by mouth every bedtime.     • potassium chloride ER (KLOR-CON) 10 MEQ tablet Take 10 mEq by mouth every day.       No facility-administered encounter medications on file as of 5/17/2017.     Review of Systems   Constitutional: Negative for fever, chills, weight loss and malaise/fatigue.   HENT: Negative for ear discharge, ear pain, hearing loss and nosebleeds.    Eyes: Negative for blurred vision, double vision, pain and discharge.   Respiratory: Negative for cough and shortness of breath.    Cardiovascular: Negative for chest pain, palpitations, orthopnea, claudication, leg swelling and PND.   Gastrointestinal: Negative for nausea,  "vomiting, abdominal pain, blood in stool and melena.   Genitourinary: Negative for dysuria and hematuria.   Musculoskeletal: Negative for myalgias, joint pain and falls.   Skin: Negative for itching and rash.   Neurological: Negative for dizziness, sensory change, speech change, loss of consciousness and headaches.   Endo/Heme/Allergies: Negative for environmental allergies. Does not bruise/bleed easily.   Psychiatric/Behavioral: Negative for depression, suicidal ideas and hallucinations.        Objective:   /80 mmHg  Pulse 74  Ht 1.803 m (5' 10.98\")  Wt 105.688 kg (233 lb)  BMI 32.51 kg/m2  SpO2 94%    Physical Exam   Constitutional: He is oriented to person, place, and time. He appears well-developed and well-nourished.   HENT:   Head: Normocephalic and atraumatic.   Eyes: EOM are normal.   Neck: Normal range of motion. No JVD present.   Cardiovascular: Normal rate, regular rhythm, normal heart sounds and intact distal pulses.  Exam reveals no gallop and no friction rub.    No murmur heard.  Bilateral femoral pulses are 2+, bilateral dorsalis pedis pulses are 2+, bilateral posterior tibialis pulses are 2+.   Pulmonary/Chest: No respiratory distress. He has no wheezes. He has no rales. He exhibits no tenderness.   Abdominal: Soft. Bowel sounds are normal. There is no tenderness. There is no rebound and no guarding.   The is no presence of abdominal bruits   Musculoskeletal: Normal range of motion.   Neurological: He is alert and oriented to person, place, and time.   Skin: Skin is warm and dry.   Psychiatric: He has a normal mood and affect.   Nursing note and vitals reviewed.      Assessment:     1. Hypercoagulable state (CMS-[facility])  LE VENOUS DUPLEX    ECHOCARDIOGRAM COMP W/O CONT    COMP METABOLIC PANEL    LIPID PANEL    CANCELED: LE VENOUS DUPLEX    CANCELED: ECHOCARDIOGRAM COMP W/O CONT   2. 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 Long term current use of anticoagulant therapy  LE VENOUS DUPLEX    ECHOCARDIOGRAM COMP W/O CONT    COMP METABOLIC PANEL    LIPID PANEL    CANCELED: LE VENOUS DUPLEX    CANCELED: ECHOCARDIOGRAM COMP W/O CONT   4. Meniere's disease, unspecified laterality  LE VENOUS DUPLEX    ECHOCARDIOGRAM COMP W/O CONT    COMP METABOLIC PANEL    LIPID PANEL    CANCELED: LE VENOUS DUPLEX    CANCELED: ECHOCARDIOGRAM COMP W/O CONT   5. Factor V Leiden (CMS-HCC)  LE VENOUS DUPLEX    ECHOCARDIOGRAM COMP W/O CONT    COMP METABOLIC PANEL    LIPID PANEL    CANCELED: LE VENOUS DUPLEX    CANCELED: ECHOCARDIOGRAM COMP W/O CONT   6. Localized swelling of both lower legs  LE VENOUS DUPLEX    ECHOCARDIOGRAM COMP W/O CONT    COMP METABOLIC PANEL    LIPID PANEL    CANCELED: LE VENOUS DUPLEX    CANCELED: ECHOCARDIOGRAM COMP W/O CONT       Medical Decision Making:  Today's Assessment / Status / Plan:     At this time, we will further investigate with a lower extremity venous duplex to assess for blood clots.  I think it would be reasonable to obtain a transthoracic echocardiogram to obtain information about cardiac functions and valvular functions.  Blood pressure is well controlled.  LDL is within goal.   Renal function and Liver function are adequate.    Cont current medications at current dose.     I will see patient back in clinic with lab tests and studies results in 6 months.    I thank you Dr. Michael for referring patient to our Cardiology Clinic today.        Delores Michael M.D.  [address] [place] [address] [address]  [address]  [place] [id number]  VIA Facsimile: [phone]

## 2017-05-17 NOTE — MR AVS SNAPSHOT
"        Michael Lockhart   2017 9:00 AM   Office Visit   MRN: [id number]    Department:  [facility]   Dept Phone:  [phone]    Description:  Male : 1935   Provider:  Cal Luong M.D.           Reason for Visit     Follow-Up Fell 17, injured right lower leg (bruised, fell on keys and pt. adjusted own warfarin dose). Unknown if taking potassium.       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 acute cor pulmonale (CMS-[facility])   [6693608]       Long term current use of anticoagulant therapy   [0664147]       Meniere's disease, unspecified laterality   [892173]       Factor V Leiden (CMS-[facility])   [679329]       Localized swelling of both lower legs   [2836902]         Vital Signs     Blood Pressure Pulse Height Weight Body Mass Index Oxygen Saturation    124/80 mmHg 74 1.803 m (5' 10.98\") 105.688 kg (233 lb) 32.51 kg/m2 94%    Smoking Status                   Never Smoker            Basic Information     Date Of Birth Sex Race Ethnicity Preferred Language    1935 Male White Non- English      Problem List              ICD-10-CM Priority Class Noted - Resolved    Ischemic heart disease I25.9 High  Unknown - Present    Pulmonary embolism (CMS-HCC) I26.99 High  Unknown - Present    Chest pain R07.9 Medium  Unknown - Present    Long term current use of anticoagulant therapy Z79.01 High  Unknown - Present    Primary hypercoagulable state (CMS-HCC) D68.59 High  Unknown - Present    Other general symptoms 780.9   Unknown - Present    Headache R51   Unknown - Present    Herpes zoster B02.9 Low  Unknown - Present    Pure hypercholesterolemia E78.00 High  Unknown - Present    Other abnormal glucose R73.09 High  Unknown " - Present    Rotator cuff (capsule) sprain S43.429A   Unknown - Present    Other chest pain R07.89   4/11/2012 - Present    Tremor R25.1   7/1/2013 - Present    Cervical radicular pain M54.12   12/14/2013 - Present    Peripheral neuropathy G62.9   12/14/2013 - Present    Spell of dizziness R42   12/14/2013 - Present    Cerebrovascular disease I67.9   1/3/2014 - Present    Dizziness R42   6/30/2014 - Present    Headache R51   7/2/2014 - Present    Hypotension I95.9   7/2/2014 - Present    Anxiety F41.9   7/2/2014 - Present    BPH (benign prostatic hyperplasia) N40.0   7/2/2014 - Present    Hypercoagulable state (CMS-HCC) D68.59   7/2/2014 - Present    Subtherapeutic international normalized ratio (INR) R79.1   7/2/2014 - Present    Meniere's disease H81.09   7/2/2014 - Present    Stroke [434.91] I63.9   5/13/2015 - Present    Factor V Leiden (CMS-HCC) D68.51   5/17/2017 - Present      Health Maintenance        Date Due Completion Dates    IMM DTaP/Tdap/Td Vaccine (1 - Tdap) 3/4/1955 ---    COLONOSCOPY 3/4/1986 ---    IMM ZOSTER VACCINE 3/4/1996 ---    IMM PNEUMOCOCCAL 65+ (ADULT) LOW/MEDIUM RISK SERIES (2 of 2 - PCV13) 1/1/2007 1/1/2006            Current Immunizations     Influenza TIV (IM) 10/16/2013    Pneumococcal polysaccharide vaccine (PPSV-23) 1/1/2006      Below and/or attached are the medications your provider expects you to take. Review all of your home medications and newly ordered medications with your provider and/or pharmacist. Follow medication instructions as directed by your provider and/or pharmacist. Please keep your medication list with you and share with your provider. Update the information when medications are discontinued, doses are changed, or new medications (including over-the-counter products) are added; and carry medication information at all times in the event of emergency situations     Allergies:  FINASTERIDE - Swelling     ZETIA - (reactions not documented)               Medications   Valid as of: May 17, 2017 -  9:16 AM    Generic Name Brand Name Tablet Size Instructions for use    B Complex Vitamins   Take 1 Tab by mouth every day.        Cholecalciferol (Tab) cholecalciferol 1000 UNIT Take 5,000 Units by mouth every day.        Gabapentin (Cap) NEURONTIN 100 MG Take 200 mg by mouth 2 Times a Day.        HydroCHLOROthiazide (Tab) HYDRODIURIL 25 MG Take 25 mg by mouth every day.        Lisinopril (Tab) PRINIVIL 5 MG Take 5 mg by mouth as needed.        Potassium Chloride (Tab CR) KLOR-CON 10 MEQ Take 10 mEq by mouth every day.        Pravastatin Sodium (Tab) PRAVACHOL 20 MG Take 1 Tab by mouth 2 Times a Day.        Tamsulosin HCl (Cap) FLOMAX 0.4 MG Take 0.4 mg by mouth every bedtime.        Warfarin Sodium (Tab) COUMADIN 5 MG Take 1-2 tabs po q day or as directed by clinic        Warfarin Sodium (Tab) COUMADIN 7.5 MG Take 1 Tab by mouth every day or as directed by clinic        .                 Medicines prescribed today were sent to:     [facility] [address] [address]    Phone: [phone] Fax: [phone]    Open 24 Hours?: No    [facility] [address] [place] [address]    Phone: [phone] Fax: [phone]    Open 24 Hours?: No      Medication refill instructions:       If your prescription bottle indicates you have medication refills left, it is not necessary to call your provider’s office. Please contact your pharmacy and they will refill your medication.    If your prescription bottle indicates you do not have any refills left, you may request refills at any time through one of the following ways: The online [employer] system (except Urgent Care), by calling your provider’s office, or by asking your pharmacy to contact your provider’s office with a refill request. Medication refills are processed only during regular business hours and may not be  available until the next business day. Your provider may request additional information or to have a follow-up visit with you prior to refilling your medication.   *Please Note: Medication refills are assigned a new Rx number when refilled electronically. Your pharmacy may indicate that no refills were authorized even though a new prescription for the same medication is available at the pharmacy. Please request the medicine by name with the pharmacy before contacting your provider for a refill.        Your To Do List     Future Labs/Procedures Complete By Expires    COMP METABOLIC PANEL  As directed 5/18/2018    ECHOCARDIOGRAM COMP W/O CONT  As directed 5/18/2018    Scheduling Instructions:    STAT    LE VENOUS DUPLEX  As directed 11/17/2017    Scheduling Instructions:    STAT         MyChart Access Code: [email or web address]  Expires: 6/16/2017  9:16 AM    [employer]  A secure, online tool to manage your health information     [employer]® is a secure, online tool that connects you to your personalized health information from the privacy of your home -- day or night - making it very easy for you to manage your healthcare. Once the activation process is completed, you can even access your medical information using the [employer] abdoulaye, which is available for free in the Apple Abdoulaye store or Google Play store.     [employer] provides the following levels of access (as shown below):   My Chart Features   [facility] Doctor [facility]  Specialists [facility]  Urgent  Care Non-[facility]  Primary Care  Doctor   Email your healthcare team securely and privately 24/7 X X X    Manage appointments: schedule your next appointment; view details of past/upcoming appointments X      Request prescription refills. X      View recent personal medical records, including lab and immunizations X X X X   View health record, including health history, allergies, medications X X X X   Read reports about your outpatient visits, procedures,  consult and ER notes X X X X   See your discharge summary, which is a recap of your hospital and/or ER visit that includes your diagnosis, lab results, and care plan. X X       How to register for [employer]:  1. Go to  [email or web address].  2. Click on the Sign Up Now box, which takes you to the New Member Sign Up page. You will need to provide the following information:  a. Enter your [employer] Access Code exactly as it appears at the top of this page. (You will not need to use this code after you’ve completed the sign-up process. If you do not sign up before the expiration date, you must request a new code.)   b. Enter your date of birth.   c. Enter your home email address.   d. Click Submit, and follow the next screen’s instructions.  3. Create a Assemblat ID. This will be your [employer] login ID and cannot be changed, so think of one that is secure and easy to remember.  4. Create a [employer] password. You can change your password at any time.  5. Enter your Password Reset Question and Answer. This can be used at a later time if you forget your password.   6. Enter your e-mail address. This allows you to receive e-mail notifications when new information is available in [employer].  7. Click Sign Up. You can now view your health information.    For assistance activating your [employer] account, call [phone]

## 2017-05-17 NOTE — PROGRESS NOTES
Subjective:   Michael Lockhart is a 82 y.o. male who presents today for cardiac care of CAD (MI in 2003), HTN, HLP, Factor V Leiden, history of PE.     At prior visit, patient reported of feeling some chest pain at random times. Pressure-like sensation. No shortness of breath. We perform myocardial PET scan stress test was negative for coronary ischemia. LVEF on that test was about 70%.    Still chronic dizziness due to Meniere's disease.     Patient fell a few weeks ago and had a lot of ecchymosis in his right lower extremity. He is worried that he might have some blood clots in his right lower extremity.    TRANSTHORACIC ECHOCARDIOGRAM 2014:  CONCLUSIONS  Normal left ventricular systolic function.  Moderate left ventricular hypertrophy.  Grade II diastolic dysfunction.  Moderately dilated right atrium.  Trace mitral regurgitation.  Aortic annular calcification.  Mild tricuspid regurgitation.    Past Medical History   Diagnosis Date   • Ischemic heart disease    • Pulmonary embolism (CMS-HCC)      Complication of cardiac catherization   • Chest pain, unspecified      History of recurrent chest pain withput evidence of acive ischemia by thallium   • Long term (current) use of anticoagulants    • Primary hypercoagulable state (CMS-[facility])      History of positive Factor V Leiden   • Other general symptoms      Fatigue with low blood pressure   • Headache      Chronic   • Herpes zoster without mention of complication    • Pure hypercholesterolemia    • Other abnormal glucose    • Rotator cuff (capsule) sprain    • Acute, but ill-defined, cerebrovascular disease (CMS-[facility])    • CAD (coronary artery disease)    • Ischemic cardiomyopathy    • Valvular heart disease      History reviewed. No pertinent past surgical history.  Family History   Problem Relation Age of Onset   • Heart Disease Mother      History   Smoking status   • Never Smoker    Smokeless tobacco   • Never Used     Allergies   Allergen Reactions   • Finasteride  Swelling     Hand swelling, stopped medications after taking for three days   • Zetia [Ezetimibe]      N/V     Outpatient Encounter Prescriptions as of 5/17/2017   Medication Sig Dispense Refill   • hydrochlorothiazide (HYDRODIURIL) 25 MG Tab Take 25 mg by mouth every day.     • warfarin (COUMADIN) 5 MG Tab Take 1-2 tabs po q day or as directed by clinic 90 Tab 3   • warfarin (COUMADIN) 7.5 MG Tab Take 1 Tab by mouth every day or as directed by clinic 90 Tab 3   • pravastatin (PRAVACHOL) 20 MG Tab Take 1 Tab by mouth 2 Times a Day. (Patient taking differently: Take 20 mg by mouth 2 Times a Day. (Reduced to 1 tab for one month now, eating coconut oil daily)) 180 Tab 2   • lisinopril (PRINIVIL) 5 MG TABS Take 5 mg by mouth as needed.     • gabapentin (NEURONTIN) 100 MG CAPS Take 200 mg by mouth 2 Times a Day.     • vitamin D (CHOLECALCIFEROL) 1000 UNIT TABS Take 5,000 Units by mouth every day.     • B Complex Vitamins (VITAMIN B COMPLEX PO) Take 1 Tab by mouth every day.     • tamsulosin (FLOMAX) 0.4 MG capsule Take 0.4 mg by mouth every bedtime.     • potassium chloride ER (KLOR-CON) 10 MEQ tablet Take 10 mEq by mouth every day.       No facility-administered encounter medications on file as of 5/17/2017.     Review of Systems   Constitutional: Negative for fever, chills, weight loss and malaise/fatigue.   HENT: Negative for ear discharge, ear pain, hearing loss and nosebleeds.    Eyes: Negative for blurred vision, double vision, pain and discharge.   Respiratory: Negative for cough and shortness of breath.    Cardiovascular: Negative for chest pain, palpitations, orthopnea, claudication, leg swelling and PND.   Gastrointestinal: Negative for nausea, vomiting, abdominal pain, blood in stool and melena.   Genitourinary: Negative for dysuria and hematuria.   Musculoskeletal: Negative for myalgias, joint pain and falls.   Skin: Negative for itching and rash.   Neurological: Negative for dizziness, sensory change, speech  "change, loss of consciousness and headaches.   Endo/Heme/Allergies: Negative for environmental allergies. Does not bruise/bleed easily.   Psychiatric/Behavioral: Negative for depression, suicidal ideas and hallucinations.        Objective:   /80 mmHg  Pulse 74  Ht 1.803 m (5' 10.98\")  Wt 105.688 kg (233 lb)  BMI 32.51 kg/m2  SpO2 94%    Physical Exam   Constitutional: He is oriented to person, place, and time. He appears well-developed and well-nourished.   HENT:   Head: Normocephalic and atraumatic.   Eyes: EOM are normal.   Neck: Normal range of motion. No JVD present.   Cardiovascular: Normal rate, regular rhythm, normal heart sounds and intact distal pulses.  Exam reveals no gallop and no friction rub.    No murmur heard.  Bilateral femoral pulses are 2+, bilateral dorsalis pedis pulses are 2+, bilateral posterior tibialis pulses are 2+.   Pulmonary/Chest: No respiratory distress. He has no wheezes. He has no rales. He exhibits no tenderness.   Abdominal: Soft. Bowel sounds are normal. There is no tenderness. There is no rebound and no guarding.   The is no presence of abdominal bruits   Musculoskeletal: Normal range of motion.   Neurological: He is alert and oriented to person, place, and time.   Skin: Skin is warm and dry.   Psychiatric: He has a normal mood and affect.   Nursing note and vitals reviewed.      Assessment:     1. Hypercoagulable state (CMS-[facility])  LE VENOUS DUPLEX    ECHOCARDIOGRAM COMP W/O CONT    COMP METABOLIC PANEL    LIPID PANEL    CANCELED: LE VENOUS DUPLEX    CANCELED: ECHOCARDIOGRAM COMP W/O CONT   2. Chronic septic pulmonary embolism without acute cor pulmonale (CMS-[facility])  LE VENOUS DUPLEX    ECHOCARDIOGRAM COMP W/O CONT    COMP METABOLIC PANEL    LIPID PANEL    CANCELED: LE VENOUS DUPLEX    CANCELED: ECHOCARDIOGRAM COMP W/O CONT   3. Long term current use of anticoagulant therapy  LE VENOUS DUPLEX    ECHOCARDIOGRAM COMP W/O CONT    COMP METABOLIC PANEL    LIPID PANEL    " CANCELED: LE VENOUS DUPLEX    CANCELED: ECHOCARDIOGRAM COMP W/O CONT   4. Meniere's disease, unspecified laterality  LE VENOUS DUPLEX    ECHOCARDIOGRAM COMP W/O CONT    COMP METABOLIC PANEL    LIPID PANEL    CANCELED: LE VENOUS DUPLEX    CANCELED: ECHOCARDIOGRAM COMP W/O CONT   5. Factor V Leiden (CMS-HCC)  LE VENOUS DUPLEX    ECHOCARDIOGRAM COMP W/O CONT    COMP METABOLIC PANEL    LIPID PANEL    CANCELED: LE VENOUS DUPLEX    CANCELED: ECHOCARDIOGRAM COMP W/O CONT   6. Localized swelling of both lower legs  LE VENOUS DUPLEX    ECHOCARDIOGRAM COMP W/O CONT    COMP METABOLIC PANEL    LIPID PANEL    CANCELED: LE VENOUS DUPLEX    CANCELED: ECHOCARDIOGRAM COMP W/O CONT       Medical Decision Making:  Today's Assessment / Status / Plan:     At this time, we will further investigate with a lower extremity venous duplex to assess for blood clots.  I think it would be reasonable to obtain a transthoracic echocardiogram to obtain information about cardiac functions and valvular functions.  Blood pressure is well controlled.  LDL is within goal.   Renal function and Liver function are adequate.    Cont current medications at current dose.     I will see patient back in clinic with lab tests and studies results in 6 months.    I thank you Dr. Michael for referring patient to our Cardiology Clinic today.

## 2017-05-18 ENCOUNTER — TELEPHONE (OUTPATIENT)
Dept: CARDIOLOGY | Facility: MEDICAL CENTER | Age: 82
End: 2017-05-18

## 2017-05-18 NOTE — PROGRESS NOTES
Quick Note:    Dear Angelika,    Can you please let Michael Lockhart know that result is ok and I will see patient as scheduled?    Thanks Rene Carney.    ______

## 2017-05-18 NOTE — TELEPHONE ENCOUNTER
ECHOCARDIOGRAM COMP W/O CONT   Status: Final result     Visible to patient:  Not Released     Dx:  Hypercoagulable state (CMS-HCC); Fact...               Notes Recorded by Cal Luong M.D. on 5/17/2017 at 7:01 PM  Dear Angelika,  Can you please let Michael Lockhart know that result is ok and I will see patient as scheduled?  Thanks Rene Carney.     S/w pt's wife about Echo results. Pt to FU as discussed at OV in 6 months.

## 2017-05-22 ENCOUNTER — TELEPHONE (OUTPATIENT)
Dept: CARDIOLOGY | Facility: MEDICAL CENTER | Age: 82
End: 2017-05-22

## 2017-05-22 NOTE — TELEPHONE ENCOUNTER
ALYCE VENOUS DUPLEX   Status: Final result     Visible to patient:  Not Released     Dx:  Hypercoagulable state (CMS-HCC); Fact...               Notes Recorded by Cal Luong M.D. on 5/19/2017 at 10:45 AM  Dear Angelika,    Can you please let Michael Chintan Lockhart know that result is ok and I will see patient as scheduled?    Thanks Rene Carney.       5/22/17:  Pt. Notified of findings.  To follow up in 6 months as planned.  YAIMA Mendez

## 2017-06-07 ENCOUNTER — ANTICOAGULATION MONITORING (OUTPATIENT)
Dept: VASCULAR LAB | Facility: MEDICAL CENTER | Age: 82
End: 2017-06-07

## 2017-06-07 LAB — INR PPP: 3.3 (ref 2–3.5)

## 2017-06-07 NOTE — PROGRESS NOTES
Anticoagulation Summary as of 6/7/2017     INR goal 2.0-3.0   Selected INR 3.3! (6/7/2017)   Maintenance plan 10 mg (5 mg x 2) on Wed, Sat; 7.5 mg (7.5 mg x 1) all other days   Weekly total 57.5 mg   Plan last modified Susy Dominique A.P.N. (6/16/2015)   Next INR check 6/21/2017   Target end date Indefinite    Indications   Pulmonary embolism (CMS-HCC) [I26.99]  Stroke [434.91] [I63.9]         Anticoagulation Episode Summary     INR check location     Preferred lab     Send INR reminders to     Maame Ceja       Anticoagulation Care Providers     Provider Role Specialty Phone number    Adolfo Nguyen M.D. Referring Cardiology [phone]    Susy Dominique A.P.N. Responsible Cardiology [phone]    SJ DarlingP.NBradley Responsible Cardiology [phone]        Anticoagulation Patient Findings    Patient's INR was SUPRA therapeutic.   Denies any unusual s/s of bleeding, bruising, clotting.  Denies any changes to:   Diet   Medications  Confirmed dosing regimen.    Pt is to reduce today then continue with current warfarin dosing regimen.    Follow up in 2 week(s)    Arcenio Bradley, PHARMD

## 2017-07-10 ENCOUNTER — ANTICOAGULATION MONITORING (OUTPATIENT)
Dept: VASCULAR LAB | Facility: MEDICAL CENTER | Age: 82
End: 2017-07-10

## 2017-07-10 LAB — INR PPP: 3.5 (ref 2–3.5)

## 2017-07-10 NOTE — PROGRESS NOTES
Anticoagulation Summary as of 7/10/2017     INR goal 2.0-3.0   Selected INR 3.5! (7/10/2017)   Maintenance plan 10 mg (5 mg x 2) on Wed, Sat; 7.5 mg (7.5 mg x 1) all other days   Weekly total 57.5 mg   Plan last modified Susy Dominique A.P.N. (6/16/2015)   Next INR check 7/24/2017   Target end date Indefinite    Indications   Pulmonary embolism (CMS-HCC) [I26.99]  Stroke [434.91] [I63.9]         Anticoagulation Episode Summary     INR check location     Preferred lab     Send INR reminders to     Maame Ceja       Anticoagulation Care Providers     Provider Role Specialty Phone number    Adolfo Nguyen M.D. Referring Cardiology [phone]    Susy Dominique A.P.N. Responsible Cardiology [phone]    SJ DarlingP.N. Responsible Cardiology [phone]        Anticoagulation Patient Findings    Spoke with pt.   INR was SUPRA therapeutic again.   Pt feels that he's not eating enough greens.   Pt denies any unusual s/s of bleeding, bruising, clotting or any changes to diet or medications.  Verifies warfarin dosing.     Pt declines to make a dosing change. Wants to take a decreased dose of warfarin today, 5mg x1 and then resume his normal weekly dosing. He states he's going to work on his diet.     Recheck INR in 2 weeks.     Shanelle Siegel, PHARMD    7/31/2017    Concur with plan outlined above    Adolfo Carrasco, LawrenceD

## 2017-08-21 ENCOUNTER — TELEPHONE (OUTPATIENT)
Dept: VASCULAR LAB | Facility: MEDICAL CENTER | Age: 82
End: 2017-08-21

## 2017-08-21 ENCOUNTER — ANTICOAGULATION MONITORING (OUTPATIENT)
Dept: VASCULAR LAB | Facility: MEDICAL CENTER | Age: 82
End: 2017-08-21

## 2017-08-21 LAB — INR PPP: 1.7 (ref 2–3.5)

## 2017-09-11 DIAGNOSIS — E78.5 HYPERLIPIDEMIA, UNSPECIFIED HYPERLIPIDEMIA TYPE: ICD-10-CM

## 2017-09-12 RX ORDER — PRAVASTATIN SODIUM 20 MG
TABLET ORAL
Qty: 180 TAB | Refills: 3 | Status: SHIPPED | OUTPATIENT
Start: 2017-09-12 | End: 2018-09-06 | Stop reason: SDUPTHER

## 2017-09-25 ENCOUNTER — ANTICOAGULATION MONITORING (OUTPATIENT)
Dept: VASCULAR LAB | Facility: MEDICAL CENTER | Age: 82
End: 2017-09-25

## 2017-09-25 DIAGNOSIS — I26.99 OTHER PULMONARY EMBOLISM WITHOUT ACUTE COR PULMONALE, UNSPECIFIED CHRONICITY (HCC): ICD-10-CM

## 2017-09-25 DIAGNOSIS — I63.9 CEREBROVASCULAR ACCIDENT (CVA), UNSPECIFIED MECHANISM (HCC): ICD-10-CM

## 2017-09-25 LAB — INR PPP: 2.2 (ref 2–3.5)

## 2017-09-25 NOTE — PROGRESS NOTES
Anticoagulation Summary  As of 9/25/2017    INR goal:   2.0-3.0   TTR:   61.4 % (2.3 y)   Today's INR:   2.2   Maintenance plan:   10 mg (5 mg x 2) on Wed, Sat; 7.5 mg (7.5 mg x 1) all other days   Weekly total:   57.5 mg   No change documented:   Gisselle Pires, Med Ass't   Plan last modified:   Susy Dominique A.P.NBradley (6/16/2015)   Next INR check:   10/9/2017   Target end date:   Indefinite    Indications    Pulmonary embolism (CMS-HCC) [I26.99]  Stroke [434.91] [I63.9]             Anticoagulation Episode Summary     INR check location:       Preferred lab:       Send INR reminders to:       Comments:   Marcial MCDANIEL      Anticoagulation Care Providers     Provider Role Specialty Phone number    Adolfo Nguyen M.D. Referring Cardiology [phone]    SJ LearyP.N. Responsible Cardiology [phone]    SJ DarlingPJASMINE Responsible Cardiology [phone]        Anticoagulation Patient Findings  Patient Findings     Negatives:   Signs/symptoms of thrombosis, Signs/symptoms of bleeding, Laboratory test error suspected, Change in health, Change in alcohol use, Change in activity, Upcoming invasive procedure, Emergency department visit, Upcoming dental procedure, Missed doses, Extra doses, Change in medications, Change in diet/appetite, Hospital admission, Bruising, Other complaints        Spoke with patient to report a therapeutic INR.  Pt instructed to continue with current warfarin dosing regimen. Pt denies any s/s of bleeding, bruising, clotting or any changes to diet or medication.  Will follow up in 2 weeks.    Gisselle Pires, Med Ass't    Agree with plan of care as stated above.    Susy Dominique APRN

## 2017-10-30 LAB — INR PPP: 2.6 (ref 2–3.5)

## 2017-11-01 ENCOUNTER — ANTICOAGULATION MONITORING (OUTPATIENT)
Dept: VASCULAR LAB | Facility: MEDICAL CENTER | Age: 82
End: 2017-11-01

## 2017-11-01 NOTE — PROGRESS NOTES
Anticoagulation Summary  As of 11/1/2017    INR goal:   2.0-3.0   TTR:   62.9 % (2.4 y)   Today's INR:   2.6 (10/30/2017)   Maintenance plan:   10 mg (5 mg x 2) on Wed, Sat; 7.5 mg (7.5 mg x 1) all other days   Weekly total:   57.5 mg   Plan last modified:   Susy Dominique A.P.NBradley (6/16/2015)   Next INR check:   11/27/2017   Target end date:   Indefinite    Indications    Pulmonary embolism (CMS-HCC) [I26.99]  Stroke [434.91] [I63.9]             Anticoagulation Episode Summary     INR check location:       Preferred lab:       Send INR reminders to:       Comments:   Marcial MCDANIEL      Anticoagulation Care Providers     Provider Role Specialty Phone number    Adolfo Nguyen M.D. Referring Cardiology [phone]    SJ LearyPBradleyNBradley Responsible Cardiology [phone]    SJ DarlingPBradleyNBradley Responsible Cardiology [phone]        Anticoagulation Patient Findings      Spoke with patient to report a therapeutic INR.    Pt instructed to continue with current warfarin dosing regimen, confirms dosing.   Pt denies any s/s of bleeding, bruising, clotting or any changes to diet or medication.    Will follow up in 4 weeks.     Shanelle Siegel, PharmD

## 2017-12-01 ENCOUNTER — ANTICOAGULATION MONITORING (OUTPATIENT)
Dept: VASCULAR LAB | Facility: MEDICAL CENTER | Age: 82
End: 2017-12-01

## 2017-12-01 LAB — INR PPP: 3.1 (ref 2–3.5)

## 2017-12-02 NOTE — PROGRESS NOTES
Anticoagulation Summary  As of 12/1/2017    INR goal:   2.0-3.0   TTR:   63.5 % (2.5 y)   Today's INR:   3.1!   Maintenance plan:   10 mg (5 mg x 2) on Wed, Sat; 7.5 mg (7.5 mg x 1) all other days   Weekly total:   57.5 mg   Plan last modified:   Susy Dominique A.P.NBradley (6/16/2015)   Next INR check:   12/29/2017   Target end date:   Indefinite    Indications    Pulmonary embolism (CMS-HCC) [I26.99]  Stroke [434.91] [I63.9]             Anticoagulation Episode Summary     INR check location:       Preferred lab:       Send INR reminders to:       Comments:   Marcial MCDANIEL      Anticoagulation Care Providers     Provider Role Specialty Phone number    Adolfo Nguyen M.D. Referring Cardiology [phone]    SJ LearyP.NBradley Responsible Cardiology [phone]    SJ DarlingPJASMINE Responsible Cardiology [phone]        Anticoagulation Patient Findings    Spoke to patient on the phone. Patients INR was supratherapeutic today at 3.1 .Patient denied any signs/symptoms of bleeding or bruising. Patient denied any recent changes to medications or diet. Patient states that he will rather eat some more greens with dinner than reduce the dose.  Follow up in 4 week(s).    Kaleigh Redd, Pharm.D

## 2017-12-11 ENCOUNTER — TELEPHONE (OUTPATIENT)
Dept: CARDIOLOGY | Facility: MEDICAL CENTER | Age: 82
End: 2017-12-11

## 2017-12-11 NOTE — TELEPHONE ENCOUNTER
patient calling about his blood pressure   Received: Today   Message Contents   DARLEEN Wing/Marleen       Patient wants to talk to you about his blood pressure issues. He can be reached at [phone].      Returned patient call. 161/95 before medications.  135/81 after medications. Pt asks if he can take another lisinopril, pt advised to take as directed by provider which states as needed. Pt states he is going to run out soon. Pt is overdue to be seen in FU. Pt scheduled for 12/14 with TT for FU.

## 2017-12-14 ENCOUNTER — OFFICE VISIT (OUTPATIENT)
Dept: CARDIOLOGY | Facility: MEDICAL CENTER | Age: 82
End: 2017-12-14
Payer: MEDICARE

## 2017-12-14 ENCOUNTER — APPOINTMENT (RX ONLY)
Dept: URBAN - METROPOLITAN AREA CLINIC 4 | Facility: CLINIC | Age: 82
Setting detail: DERMATOLOGY
End: 2017-12-14

## 2017-12-14 VITALS
BODY MASS INDEX: 33.6 KG/M2 | DIASTOLIC BLOOD PRESSURE: 80 MMHG | SYSTOLIC BLOOD PRESSURE: 134 MMHG | WEIGHT: 240 LBS | HEIGHT: 71 IN | HEART RATE: 80 BPM

## 2017-12-14 DIAGNOSIS — I26.90 CHRONIC SEPTIC PULMONARY EMBOLISM WITHOUT ACUTE COR PULMONALE (HCC): ICD-10-CM

## 2017-12-14 DIAGNOSIS — I10 ESSENTIAL HYPERTENSION: ICD-10-CM

## 2017-12-14 DIAGNOSIS — Z79.01 LONG TERM CURRENT USE OF ANTICOAGULANT THERAPY: ICD-10-CM

## 2017-12-14 DIAGNOSIS — L81.4 OTHER MELANIN HYPERPIGMENTATION: ICD-10-CM

## 2017-12-14 DIAGNOSIS — D68.51 FACTOR V LEIDEN (HCC): ICD-10-CM

## 2017-12-14 DIAGNOSIS — L82.1 OTHER SEBORRHEIC KERATOSIS: ICD-10-CM

## 2017-12-14 DIAGNOSIS — D18.0 HEMANGIOMA: ICD-10-CM

## 2017-12-14 DIAGNOSIS — L57.0 ACTINIC KERATOSIS: ICD-10-CM

## 2017-12-14 DIAGNOSIS — L82.0 INFLAMED SEBORRHEIC KERATOSIS: ICD-10-CM

## 2017-12-14 DIAGNOSIS — D22 MELANOCYTIC NEVI: ICD-10-CM

## 2017-12-14 DIAGNOSIS — I27.82 CHRONIC SEPTIC PULMONARY EMBOLISM WITHOUT ACUTE COR PULMONALE (HCC): ICD-10-CM

## 2017-12-14 DIAGNOSIS — D68.59 HYPERCOAGULABLE STATE (HCC): ICD-10-CM

## 2017-12-14 DIAGNOSIS — E78.5 HYPERLIPIDEMIA, UNSPECIFIED HYPERLIPIDEMIA TYPE: ICD-10-CM

## 2017-12-14 PROBLEM — D18.01 HEMANGIOMA OF SKIN AND SUBCUTANEOUS TISSUE: Status: ACTIVE | Noted: 2017-12-14

## 2017-12-14 PROBLEM — D22.39 MELANOCYTIC NEVI OF OTHER PARTS OF FACE: Status: ACTIVE | Noted: 2017-12-14

## 2017-12-14 PROCEDURE — 17000 DESTRUCT PREMALG LESION: CPT | Mod: 59

## 2017-12-14 PROCEDURE — 99213 OFFICE O/P EST LOW 20 MIN: CPT | Mod: 25

## 2017-12-14 PROCEDURE — ? COUNSELING

## 2017-12-14 PROCEDURE — ? LIQUID NITROGEN

## 2017-12-14 PROCEDURE — 99214 OFFICE O/P EST MOD 30 MIN: CPT | Performed by: INTERNAL MEDICINE

## 2017-12-14 RX ORDER — LISINOPRIL 5 MG/1
5 TABLET ORAL PRN
Qty: 90 TAB | Refills: 3 | Status: SHIPPED | OUTPATIENT
Start: 2017-12-14 | End: 2019-05-02 | Stop reason: SDUPTHER

## 2017-12-14 ASSESSMENT — ENCOUNTER SYMPTOMS
COUGH: 0
BLURRED VISION: 0
EYE DISCHARGE: 0
DIZZINESS: 0
FALLS: 0
DEPRESSION: 0
WEIGHT LOSS: 0
SENSORY CHANGE: 0
ORTHOPNEA: 0
PALPITATIONS: 0
FEVER: 0
MYALGIAS: 0
NAUSEA: 0
HALLUCINATIONS: 0
PND: 0
SPEECH CHANGE: 0
BLOOD IN STOOL: 0
ABDOMINAL PAIN: 0
DOUBLE VISION: 0
EYE PAIN: 0
SHORTNESS OF BREATH: 0
BRUISES/BLEEDS EASILY: 0
LOSS OF CONSCIOUSNESS: 0
CLAUDICATION: 0
VOMITING: 0
HEADACHES: 0
CHILLS: 0

## 2017-12-14 ASSESSMENT — LOCATION SIMPLE DESCRIPTION DERM
LOCATION SIMPLE: LEFT UPPER ARM
LOCATION SIMPLE: RIGHT UPPER ARM
LOCATION SIMPLE: RIGHT FOREARM
LOCATION SIMPLE: RIGHT HAND
LOCATION SIMPLE: RIGHT LOWER BACK
LOCATION SIMPLE: LEFT CHEEK
LOCATION SIMPLE: LEFT FOREARM
LOCATION SIMPLE: LEFT FOREHEAD
LOCATION SIMPLE: RIGHT UPPER BACK
LOCATION SIMPLE: LEFT HAND
LOCATION SIMPLE: RIGHT CHEEK

## 2017-12-14 ASSESSMENT — LOCATION DETAILED DESCRIPTION DERM
LOCATION DETAILED: RIGHT INFERIOR MEDIAL UPPER BACK
LOCATION DETAILED: RIGHT PROXIMAL DORSAL FOREARM
LOCATION DETAILED: RIGHT RADIAL DORSAL HAND
LOCATION DETAILED: LEFT DISTAL POSTERIOR UPPER ARM
LOCATION DETAILED: LEFT INFERIOR LATERAL FOREHEAD
LOCATION DETAILED: RIGHT INFERIOR CENTRAL MALAR CHEEK
LOCATION DETAILED: RIGHT INFERIOR UPPER BACK
LOCATION DETAILED: LEFT CENTRAL MALAR CHEEK
LOCATION DETAILED: LEFT PROXIMAL DORSAL FOREARM
LOCATION DETAILED: LEFT RADIAL DORSAL HAND
LOCATION DETAILED: RIGHT INFERIOR MEDIAL MIDBACK
LOCATION DETAILED: LEFT ULNAR DORSAL HAND
LOCATION DETAILED: RIGHT DISTAL POSTERIOR UPPER ARM

## 2017-12-14 ASSESSMENT — LOCATION ZONE DERM
LOCATION ZONE: FACE
LOCATION ZONE: ARM
LOCATION ZONE: HAND
LOCATION ZONE: TRUNK

## 2017-12-14 NOTE — PROCEDURE: LIQUID NITROGEN
Post-Care Instructions: I reviewed with the patient in detail post-care instructions. Patient is to wear sunprotection, and avoid picking at any of the treated lesions. Pt may apply Vaseline to crusted or scabbing areas.
Render Post-Care Instructions In Note?: no
Medical Necessity Information: It is in your best interest to select a reason for this procedure from the list below. All of these items fulfill various CMS LCD requirements except the new and changing color options.
Consent: The patient's consent was obtained including but not limited to risks of crusting, scabbing, blistering, scarring, darker or lighter pigmentary change, recurrence, incomplete removal and infection.
Detail Level: Detailed
Medical Necessity Clause: This procedure was medically necessary because the lesions that were treated were:
Number Of Freeze-Thaw Cycles: 1 freeze-thaw cycle
Duration Of Freeze Thaw-Cycle (Seconds): 5

## 2017-12-14 NOTE — LETTER
[facility]   [address], [address]  [place] [address]  Phone: [phone]  Fax: [phone]              Michael Lockhart  1935    Encounter Date: 12/14/2017    Cal Luong M.D.          PROGRESS NOTE:  Subjective:   Michael Lockhart is a 82 y.o. male who presents today for cardiac care of CAD (MI in 2003), HTN, HLP, Factor V Leiden, history of PE.      At prior visit, patient reported of feeling some chest pain at random times. Pressure-like sensation. No shortness of breath. We perform myocardial PET scan stress test was negative for coronary ischemia. LVEF on that test was about 70%.     Still chronic dizziness due to Meniere's disease and visual problems. Has been seen at [facility].       Past Medical History:   Diagnosis Date   • Acute, but ill-defined, cerebrovascular disease    • CAD (coronary artery disease)    • Chest pain, unspecified     History of recurrent chest pain withput evidence of acive ischemia by thallium   • General symptoms NEC     Fatigue with low blood pressure   • Headache(784.0)     Chronic   • Herpes zoster without mention of complication    • Ischemic cardiomyopathy    • Ischemic heart disease    • Long term (current) use of anticoagulants    • Other abnormal glucose    • Primary hypercoagulable state (CMS-HCC)     History of positive Factor V Leiden   • Pulmonary embolism (CMS-HCC)     Complication of cardiac catherization   • Pure hypercholesterolemia    • Rotator cuff (capsule) sprain    • Valvular heart disease      History reviewed. No pertinent surgical history.  Family History   Problem Relation Age of Onset   • Heart Disease Mother      History   Smoking Status   • Never Smoker   Smokeless Tobacco   • Never Used     Allergies   Allergen Reactions   • Finasteride Swelling     Hand swelling, stopped medications after taking for three days   • Zetia [Ezetimibe]      N/V     Outpatient Encounter Prescriptions as of  12/14/2017   Medication Sig Dispense Refill   • pravastatin (PRAVACHOL) 20 MG Tab TAKE 1 TABLET TWICE A  Tab 3   • warfarin (COUMADIN) 5 MG Tab Take 1-2 tabs po q day or as directed by clinic 90 Tab 3   • warfarin (COUMADIN) 7.5 MG Tab Take 1 Tab by mouth every day or as directed by clinic 90 Tab 3   • lisinopril (PRINIVIL) 5 MG TABS Take 5 mg by mouth as needed.     • vitamin D (CHOLECALCIFEROL) 1000 UNIT TABS Take 5,000 Units by mouth every day.     • B Complex Vitamins (VITAMIN B COMPLEX PO) Take 1 Tab by mouth every day.     • hydrochlorothiazide (HYDRODIURIL) 25 MG Tab Take 25 mg by mouth every day.     • potassium chloride ER (KLOR-CON) 10 MEQ tablet Take 10 mEq by mouth every day.     • gabapentin (NEURONTIN) 100 MG CAPS Take 200 mg by mouth 2 Times a Day.     • tamsulosin (FLOMAX) 0.4 MG capsule Take 0.4 mg by mouth every bedtime.       No facility-administered encounter medications on file as of 12/14/2017.      Review of Systems   Constitutional: Negative for chills, fever, malaise/fatigue and weight loss.   HENT: Negative for ear discharge, ear pain, hearing loss and nosebleeds.    Eyes: Negative for blurred vision, double vision, pain and discharge.   Respiratory: Negative for cough and shortness of breath.    Cardiovascular: Negative for chest pain, palpitations, orthopnea, claudication, leg swelling and PND.   Gastrointestinal: Negative for abdominal pain, blood in stool, melena, nausea and vomiting.   Genitourinary: Negative for dysuria and hematuria.   Musculoskeletal: Negative for falls, joint pain and myalgias.   Skin: Negative for itching and rash.   Neurological: Negative for dizziness, sensory change, speech change, loss of consciousness and headaches.   Endo/Heme/Allergies: Negative for environmental allergies. Does not bruise/bleed easily.   Psychiatric/Behavioral: Negative for depression, hallucinations and suicidal ideas.        Objective:   /80   Pulse 80   Ht 1.803 m (5'  "11\")   Wt 108.9 kg (240 lb)   BMI 33.47 kg/m²      Physical Exam   Constitutional: He is oriented to person, place, and time. He appears well-developed and well-nourished.   HENT:   Head: Normocephalic and atraumatic.   Eyes: EOM are normal.   Neck: Normal range of motion. No JVD present.   Cardiovascular: Normal rate, regular rhythm and normal heart sounds.  Exam reveals no gallop and no friction rub.    No murmur heard.  Pulmonary/Chest: No respiratory distress. He has no wheezes. He has no rales. He exhibits no tenderness.   Abdominal: Soft. Bowel sounds are normal. There is no tenderness. There is no rebound and no guarding.   The is no presence of abdominal bruits   Musculoskeletal: Normal range of motion.   Neurological: He is alert and oriented to person, place, and time.   Skin: Skin is warm and dry.   Psychiatric: He has a normal mood and affect.   Nursing note and vitals reviewed.      Assessment:     1. Factor V Leiden (CMS-[facility])     2. Hyperlipidemia, unspecified hyperlipidemia type     3. Hypercoagulable state (CMS-[facility])     4. Chronic septic pulmonary embolism without acute cor pulmonale (CMS-[facility])     5. Long term current use of anticoagulant therapy         Medical Decision Making:  Today's Assessment / Status / Plan:   At this time patient is clinically stable in terms of his cardiac standpoint.  Blood pressure is well controlled.  Cont current medications at current dose.     I will see patient back in clinic with lab tests and studies results in 6 months.    I thank you Dr. Michael for referring patient to our Cardiology Clinic today.        Delores Michael M.D.  [address]  [address]  [place] [address]  VIA Facsimile: [phone]                 "

## 2017-12-14 NOTE — PROGRESS NOTES
Subjective:   Michael Lockhart is a 82 y.o. male who presents today for cardiac care of CAD (MI in 2003), HTN, HLP, Factor V Leiden, history of PE.      At prior visit, patient reported of feeling some chest pain at random times. Pressure-like sensation. No shortness of breath. We perform myocardial PET scan stress test was negative for coronary ischemia. LVEF on that test was about 70%.     Still chronic dizziness due to Meniere's disease and visual problems. Has been seen at [facility].       Past Medical History:   Diagnosis Date   • Acute, but ill-defined, cerebrovascular disease    • CAD (coronary artery disease)    • Chest pain, unspecified     History of recurrent chest pain withput evidence of acive ischemia by thallium   • General symptoms NEC     Fatigue with low blood pressure   • Headache(784.0)     Chronic   • Herpes zoster without mention of complication    • Ischemic cardiomyopathy    • Ischemic heart disease    • Long term (current) use of anticoagulants    • Other abnormal glucose    • Primary hypercoagulable state (CMS-HCC)     History of positive Factor V Leiden   • Pulmonary embolism (CMS-HCC)     Complication of cardiac catherization   • Pure hypercholesterolemia    • Rotator cuff (capsule) sprain    • Valvular heart disease      History reviewed. No pertinent surgical history.  Family History   Problem Relation Age of Onset   • Heart Disease Mother      History   Smoking Status   • Never Smoker   Smokeless Tobacco   • Never Used     Allergies   Allergen Reactions   • Finasteride Swelling     Hand swelling, stopped medications after taking for three days   • Zetia [Ezetimibe]      N/V     Outpatient Encounter Prescriptions as of 12/14/2017   Medication Sig Dispense Refill   • pravastatin (PRAVACHOL) 20 MG Tab TAKE 1 TABLET TWICE A  Tab 3   • warfarin (COUMADIN) 5 MG Tab Take 1-2 tabs po q day or as directed by clinic 90 Tab 3   • warfarin (COUMADIN) 7.5 MG Tab Take 1 Tab by mouth every day  "or as directed by clinic 90 Tab 3   • lisinopril (PRINIVIL) 5 MG TABS Take 5 mg by mouth as needed.     • vitamin D (CHOLECALCIFEROL) 1000 UNIT TABS Take 5,000 Units by mouth every day.     • B Complex Vitamins (VITAMIN B COMPLEX PO) Take 1 Tab by mouth every day.     • hydrochlorothiazide (HYDRODIURIL) 25 MG Tab Take 25 mg by mouth every day.     • potassium chloride ER (KLOR-CON) 10 MEQ tablet Take 10 mEq by mouth every day.     • gabapentin (NEURONTIN) 100 MG CAPS Take 200 mg by mouth 2 Times a Day.     • tamsulosin (FLOMAX) 0.4 MG capsule Take 0.4 mg by mouth every bedtime.       No facility-administered encounter medications on file as of 12/14/2017.      Review of Systems   Constitutional: Negative for chills, fever, malaise/fatigue and weight loss.   HENT: Negative for ear discharge, ear pain, hearing loss and nosebleeds.    Eyes: Negative for blurred vision, double vision, pain and discharge.   Respiratory: Negative for cough and shortness of breath.    Cardiovascular: Negative for chest pain, palpitations, orthopnea, claudication, leg swelling and PND.   Gastrointestinal: Negative for abdominal pain, blood in stool, melena, nausea and vomiting.   Genitourinary: Negative for dysuria and hematuria.   Musculoskeletal: Negative for falls, joint pain and myalgias.   Skin: Negative for itching and rash.   Neurological: Negative for dizziness, sensory change, speech change, loss of consciousness and headaches.   Endo/Heme/Allergies: Negative for environmental allergies. Does not bruise/bleed easily.   Psychiatric/Behavioral: Negative for depression, hallucinations and suicidal ideas.        Objective:   /80   Pulse 80   Ht 1.803 m (5' 11\")   Wt 108.9 kg (240 lb)   BMI 33.47 kg/m²     Physical Exam   Constitutional: He is oriented to person, place, and time. He appears well-developed and well-nourished.   HENT:   Head: Normocephalic and atraumatic.   Eyes: EOM are normal.   Neck: Normal range of motion. " No JVD present.   Cardiovascular: Normal rate, regular rhythm and normal heart sounds.  Exam reveals no gallop and no friction rub.    No murmur heard.  Pulmonary/Chest: No respiratory distress. He has no wheezes. He has no rales. He exhibits no tenderness.   Abdominal: Soft. Bowel sounds are normal. There is no tenderness. There is no rebound and no guarding.   The is no presence of abdominal bruits   Musculoskeletal: Normal range of motion.   Neurological: He is alert and oriented to person, place, and time.   Skin: Skin is warm and dry.   Psychiatric: He has a normal mood and affect.   Nursing note and vitals reviewed.      Assessment:     1. Factor V Leiden (CMS-[facility])     2. Hyperlipidemia, unspecified hyperlipidemia type     3. Hypercoagulable state (CMS-[facility])     4. 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 Long term current use of anticoagulant therapy         Medical Decision Making:  Today's Assessment / Status / Plan:   At this time patient is clinically stable in terms of his cardiac standpoint.  Blood pressure is well controlled.  Cont current medications at current dose.     I will see patient back in clinic with lab tests and studies results in 6 months.    I thank you Dr. Michael for referring patient to our Cardiology Clinic today.

## 2017-12-23 LAB
ALBUMIN SERPL-MCNC: 4.3 G/DL (ref 3.5–4.7)
ALBUMIN/GLOB SERPL: 1.8 {RATIO} (ref 1.2–2.2)
ALP SERPL-CCNC: 29 IU/L (ref 39–117)
ALT SERPL-CCNC: 16 IU/L (ref 0–44)
AST SERPL-CCNC: 17 IU/L (ref 0–40)
BILIRUB SERPL-MCNC: 0.5 MG/DL (ref 0–1.2)
BUN SERPL-MCNC: 25 MG/DL (ref 8–27)
BUN/CREAT SERPL: 22 (ref 10–24)
CALCIUM SERPL-MCNC: 9.3 MG/DL (ref 8.6–10.2)
CHLORIDE SERPL-SCNC: 99 MMOL/L (ref 96–106)
CHOLEST SERPL-MCNC: 151 MG/DL (ref 100–199)
CO2 SERPL-SCNC: 25 MMOL/L (ref 18–29)
COMMENT 011824: ABNORMAL
CREAT SERPL-MCNC: 1.12 MG/DL (ref 0.76–1.27)
GLOBULIN SER CALC-MCNC: 2.4 G/DL (ref 1.5–4.5)
GLUCOSE SERPL-MCNC: 95 MG/DL (ref 65–99)
HDLC SERPL-MCNC: 35 MG/DL
IF AFRICAN AMERICAN  100797: 71 ML/MIN/1.73
IF NON AFRICAN AMER 100791: 61 ML/MIN/1.73
LDLC SERPL CALC-MCNC: 88 MG/DL (ref 0–99)
POTASSIUM SERPL-SCNC: 4.4 MMOL/L (ref 3.5–5.2)
PROT SERPL-MCNC: 6.7 G/DL (ref 6–8.5)
SODIUM SERPL-SCNC: 138 MMOL/L (ref 134–144)
TRIGL SERPL-MCNC: 142 MG/DL (ref 0–149)
VLDLC SERPL CALC-MCNC: 28 MG/DL (ref 5–40)

## 2018-01-04 ENCOUNTER — ANTICOAGULATION MONITORING (OUTPATIENT)
Dept: VASCULAR LAB | Facility: MEDICAL CENTER | Age: 83
End: 2018-01-04

## 2018-01-04 DIAGNOSIS — I26.99 OTHER PULMONARY EMBOLISM WITHOUT ACUTE COR PULMONALE, UNSPECIFIED CHRONICITY (HCC): ICD-10-CM

## 2018-01-04 LAB — INR PPP: 2.2 (ref 2–3.5)

## 2018-01-22 ENCOUNTER — TELEPHONE (OUTPATIENT)
Dept: CARDIOLOGY | Facility: MEDICAL CENTER | Age: 83
End: 2018-01-22

## 2018-01-22 NOTE — TELEPHONE ENCOUNTER
"wants to discuss his blood pressure medication   Received: Today   Message Contents   DARLEEN Wing/Marleen       Patient wants to talk to you about his blood pressure medication. He can be reached at [phone].      Returned patient call. Patient not home. Spoke with Adelita pts wife. She states he has been checking his BP constantly and \"driving me crazy about it\". Adelita reassured her notes that pts BP was found to be in good control on 12/14 during  Last visit and lisinopril 5 mg daily was continued and sent to [facility] x 1 year supply. She states that is good and she will let him know.   "

## 2018-01-30 ENCOUNTER — TELEPHONE (OUTPATIENT)
Dept: CARDIOLOGY | Facility: MEDICAL CENTER | Age: 83
End: 2018-01-30

## 2018-01-30 NOTE — TELEPHONE ENCOUNTER
Patient calling to report his BP has been high.  He reports:     152/96 HR 65 (today)  143/100 HR 75  124/88 HR 88  127/90 HR 89  135/90 HR 68    He denies chest pain, shortness of breath, nausea, edema, weight gain, nausea, headaches.  He does c/o dizziness when his DBP reaches 100.  He has been taking his medications.  He is inquiring if he can double up on his Lisinopril dose.  Educated patient to take medication as prescribed until an MD or APRN evaluates him.    Recommended he be seen in the office. Offered an appointment today at 1530 however he states his wife isn't home to drive him.  An appointment has been scheduled on Thursday 2/1/18 at 0740 with APRN.  Patient acknowledges understanding.

## 2018-02-01 ENCOUNTER — OFFICE VISIT (OUTPATIENT)
Dept: CARDIOLOGY | Facility: MEDICAL CENTER | Age: 83
End: 2018-02-01
Payer: MEDICARE

## 2018-02-01 VITALS
DIASTOLIC BLOOD PRESSURE: 72 MMHG | BODY MASS INDEX: 31.5 KG/M2 | HEIGHT: 71 IN | WEIGHT: 225 LBS | SYSTOLIC BLOOD PRESSURE: 110 MMHG | HEART RATE: 80 BPM | OXYGEN SATURATION: 92 %

## 2018-02-01 DIAGNOSIS — E78.5 HYPERLIPIDEMIA, UNSPECIFIED HYPERLIPIDEMIA TYPE: ICD-10-CM

## 2018-02-01 DIAGNOSIS — D68.59 HYPERCOAGULABLE STATE (HCC): ICD-10-CM

## 2018-02-01 DIAGNOSIS — Z79.01 LONG TERM CURRENT USE OF ANTICOAGULANT THERAPY: ICD-10-CM

## 2018-02-01 DIAGNOSIS — I10 ESSENTIAL HYPERTENSION: ICD-10-CM

## 2018-02-01 DIAGNOSIS — D68.51 FACTOR V LEIDEN (HCC): ICD-10-CM

## 2018-02-01 LAB — EKG IMPRESSION: NORMAL

## 2018-02-01 PROCEDURE — 99214 OFFICE O/P EST MOD 30 MIN: CPT | Performed by: NURSE PRACTITIONER

## 2018-02-01 PROCEDURE — 93000 ELECTROCARDIOGRAM COMPLETE: CPT | Performed by: NURSE PRACTITIONER

## 2018-02-01 ASSESSMENT — ENCOUNTER SYMPTOMS
PND: 0
DIZZINESS: 0
COUGH: 0
PALPITATIONS: 0
ABDOMINAL PAIN: 0
CLAUDICATION: 0
FEVER: 0
SHORTNESS OF BREATH: 0
HEADACHES: 1
ORTHOPNEA: 0
MYALGIAS: 0

## 2018-02-01 NOTE — PROGRESS NOTES
Subjective:   Michael Lockhart is a 82 y.o. male who presents today for follow up on high BP with his wife, Concetta.    Patient of Dr. Luong. Pt was last seen 12/14/17. Pt comes into the office today after having high BP 2 days ago. His BP was 152/102 and pt had a headache and some left Low chest/upper abdomen pain. Pt otherwise denies chest pain with/without exertion, palpitations, SOB, dizziness/lightheadedness, orthopnea, PND or edema.    Patient reports he did call the office to report the elevated blood pressure and asked if he could take 2 of his lisinopril 5 mg. Patient was recommended to come in for follow-up. Patient did take 2 tabs of 5 mg dose of lisinopril and it did drop his blood pressure back down to the low 100s. His headache and pain resolved.    Patient normally takes lisinopril 5 mg as needed for elevated blood pressure. Normally his BP ranges are 90s to low 100s, systolic. He will take an extra lisinopril if it is BP approaches 140s or above.     Patient reports no changes in his diet or increase in stress/problems in his life. Patient reports he is following a low sodium.    Additonally, patient has the following medical problems:    -CAD (MI in 2003)    -Hypertension    -Hyperlipidemia    -Factor V Leiden: taking warfarin    -Hx PE    -Ménière's disease, followed by [facility] clinic, no longer following  Past Medical History:   Diagnosis Date   • Acute, but ill-defined, cerebrovascular disease    • CAD (coronary artery disease)    • Chest pain, unspecified     History of recurrent chest pain withput evidence of acive ischemia by thallium   • General symptoms NEC     Fatigue with low blood pressure   • Headache(784.0)     Chronic   • Herpes zoster without mention of complication    • Ischemic cardiomyopathy    • Ischemic heart disease    • Long term (current) use of anticoagulants    • Other abnormal glucose    • Primary hypercoagulable state (CMS-[facility])     History of positive Factor V Leiden   •  "Pulmonary embolism (CMS-[facility])     Complication of cardiac catherization   • Pure hypercholesterolemia    • Rotator cuff (capsule) sprain    • Valvular heart disease      Past Surgical History:   Procedure Laterality Date   • ANGIOPLASTY  2003     Family History   Problem Relation Age of Onset   • Heart Disease Mother    • Arthritis Mother    • Other Sister      overweight   • Other Brother      tremors   • Clotting Disorder Brother      Factor V     History   Smoking Status   • Never Smoker   Smokeless Tobacco   • Never Used     Allergies   Allergen Reactions   • Finasteride Swelling     Hand swelling, stopped medications after taking for three days   • Zetia [Ezetimibe]      N/V     Outpatient Encounter Prescriptions as of 2/1/2018   Medication Sig Dispense Refill   • warfarin (COUMADIN) 7.5 MG Tab TAKE 1 TABLET DAILY OR AS DIRECTED BY CLINIC 90 Tab 1   • lisinopril (PRINIVIL) 5 MG Tab Take 1 Tab by mouth as needed. 90 Tab 3   • pravastatin (PRAVACHOL) 20 MG Tab TAKE 1 TABLET TWICE A  Tab 3   • warfarin (COUMADIN) 5 MG Tab Take 1-2 tabs po q day or as directed by clinic 90 Tab 3   • vitamin D (CHOLECALCIFEROL) 1000 UNIT TABS Take 5,000 Units by mouth every day.     • B Complex Vitamins (VITAMIN B COMPLEX PO) Take 1 Tab by mouth every day.       No facility-administered encounter medications on file as of 2/1/2018.      Review of Systems   Constitutional: Negative for fever and malaise/fatigue.   Respiratory: Negative for cough and shortness of breath.    Cardiovascular: Positive for chest pain (during episode of HTN). Negative for palpitations, orthopnea, claudication, leg swelling and PND.   Gastrointestinal: Negative for abdominal pain.   Musculoskeletal: Negative for myalgias.   Neurological: Positive for headaches. Negative for dizziness.   All other systems reviewed and are negative.       Objective:   /72   Pulse 80   Ht 1.803 m (5' 11\")   Wt 102.1 kg (225 lb)   SpO2 92%   BMI 31.38 kg/m² "     Physical Exam   Constitutional: He is oriented to person, place, and time. He appears well-developed and well-nourished.   HENT:   Head: Normocephalic and atraumatic.   Eyes: EOM are normal.   Neck: Normal range of motion. Neck supple. No JVD present.   Cardiovascular: Normal rate, regular rhythm, normal heart sounds and intact distal pulses.    No murmur heard.  Pulmonary/Chest: Effort normal and breath sounds normal. No respiratory distress. He has no wheezes. He has no rales.   Abdominal: Soft. Bowel sounds are normal.   Musculoskeletal: Normal range of motion. He exhibits no edema.   Neurological: He is alert and oriented to person, place, and time.   Skin: Skin is warm and dry.   Psychiatric: He has a normal mood and affect.   Nursing note and vitals reviewed.          Lab Results   Component Value Date/Time    CHOLSTRLTOT 151 12/22/2017 08:42 AM    CHOLSTRLTOT 147 07/01/2014 03:09 AM    LDL 88 12/22/2017 08:42 AM    LDL 86 07/01/2014 03:09 AM    HDL 35 (L) 12/22/2017 08:42 AM    HDL 31 (A) 07/01/2014 03:09 AM    TRIGLYCERIDE 142 12/22/2017 08:42 AM    TRIGLYCERIDE 150 (H) 07/01/2014 03:09 AM       Lab Results   Component Value Date/Time    SODIUM 138 12/22/2017 08:42 AM    SODIUM 139 07/01/2014 03:09 AM    POTASSIUM 4.4 12/22/2017 08:42 AM    POTASSIUM 4.0 07/01/2014 03:09 AM    CHLORIDE 99 12/22/2017 08:42 AM    CHLORIDE 109 07/01/2014 03:09 AM    CO2 25 12/22/2017 08:42 AM    CO2 24 07/01/2014 03:09 AM    GLUCOSE 95 12/22/2017 08:42 AM    GLUCOSE 104 (H) 07/01/2014 03:09 AM    BUN 25 12/22/2017 08:42 AM    BUN 21 07/01/2014 03:09 AM    CREATININE 1.12 12/22/2017 08:42 AM    CREATININE 0.99 07/01/2014 03:09 AM    BUNCREATRAT 22 12/22/2017 08:42 AM     Lab Results   Component Value Date/Time    ALKPHOSPHAT 29 (L) 12/22/2017 08:42 AM    ALKPHOSPHAT 18 (L) 09/12/2012 09:14 AM    ASTSGOT 17 12/22/2017 08:42 AM    ASTSGOT 20 09/12/2012 09:14 AM    ALTSGPT 16 12/22/2017 08:42 AM    ALTSGPT 23 09/12/2012 09:14  AM    TBILIRUBIN 0.5 12/22/2017 08:42 AM    TBILIRUBIN 0.7 09/12/2012 09:14 AM        PET Stress Test 2/1/17   ELECTROCARDIOGRAPHIC FINDINGS:  At rest patient has sinus rhythm.  With   dipyridamole infusion, first degree AV block was noted, no evidence of   ischemia.     SCINTOGRAPHIC FINDINGS:  The QC data for the scan was reviewed and was within   acceptable limits.  In both stress and rest imaging, there is normal   myocardial perfusion.  No evidence of ischemia or infarction.     GATED WALL MOTION FINDINGS:  By gated test, there is normal regional wall   motion with a measured ejection fraction of 70%.     CONCLUSIONS AND IMPRESSIONS:  Negative or normal PET perfusion imaging for   ischemia.  No evidence of ischemia or infarction.  Normal wall motion and   resting ejection fraction.    Echo 7/1/14  CONCLUSIONS  Normal left ventricular systolic function.  Moderate left ventricular hypertrophy.  Grade II diastolic dysfunction.  Moderately dilated right atrium.  Trace mitral regurgitation.  Aortic annular calcification.  Mild tricuspid regurgitation      Transthoracic Echo Report 5/17/17  Compared to the report of the study done on07/01/2014 there has been no   significant change.  Normal left ventricular systolic function.   No evidence of valvular abnormality based on Doppler evaluation.     Assessment:     1. Essential hypertension  [facility] EKG (Clinic Performed)   2. Hyperlipidemia, unspecified hyperlipidemia type     3. Hypercoagulable state (CMS-HCC)     4. Long term current use of anticoagulant therapy     5. Factor V Leiden (CMS-HCC)         Medical Decision Making:  Today's Assessment / Status / Plan:   1. HTN: Today in clinic his blood pressure is down to his normal range. His wrist BP cuff was compared to the clinic and is accurate. EKG did not show any changes. Sinus rhythm 68 with a first-degree heart block.  -Will have patient continue lisinopril 5-10 mg as needed for elevated blood  pressure.  -Patient to continue to Monitor and log Blood pressures at home. Call office or RTC if BP increasing or >180/100 or if symptoms of elevated blood pressure present. Reviewed s/sx of stroke and heart attack. Patient to go to ER or call 911 if present.     2. Hyperlipidemia: Last LDL was 88 on 12/22/17  -Continue pravastatin 20 mg nightly    3. Factor V leiden:  -Continue warfarin, followed by the anticoagulation clinic    FU in clinic in 4 months with Dr. Luong. Sooner if needed.    Patient verbalizes understanding and agrees with the plan of care.     Collaborating MD: Mick Bone MD

## 2018-02-01 NOTE — LETTER
[facility]   [address], [facility] [address]  [place] [address]  Phone: [phone]  Fax: [phone]              Michael Lockhart  1935    Encounter Date: 2/1/2018    NINFA Ascencio          PROGRESS NOTE:  Subjective:   Michael Lockhart is a 82 y.o. male who presents today for follow up on high BP with his wife, Concetta.    Patient of Dr. Luong. Pt was last seen 12/14/17. Pt comes into the office today after having high BP 2 days ago. His BP was 152/102 and pt had a headache and some left Low chest/upper abdomen pain. Pt otherwise denies chest pain with/without exertion, palpitations, SOB, dizziness/lightheadedness, orthopnea, PND or edema.    Patient reports he did call the office to report the elevated blood pressure and asked if he could take 2 of his lisinopril 5 mg. Patient was recommended to come in for follow-up. Patient did take 2 tabs of 5 mg dose of lisinopril and it did drop his blood pressure back down to the low 100s. His headache and pain resolved.    Patient normally takes lisinopril 5 mg as needed for elevated blood pressure. Normally his BP ranges are 90s to low 100s, systolic. He will take an extra lisinopril if it is BP approaches 140s or above.     Patient reports no changes in his diet or increase in stress/problems in his life. Patient reports he is following a low sodium.    Additonally, patient has the following medical problems:    -CAD (MI in 2003)    -Hypertension    -Hyperlipidemia    -Factor V Leiden: taking warfarin    -Hx PE    -Ménière's disease, followed by [facility], no longer following  Past Medical History:   Diagnosis Date   • Acute, but ill-defined, cerebrovascular disease    • CAD (coronary artery disease)    • Chest pain, unspecified     History of recurrent chest pain withput evidence of acive ischemia by thallium   • General symptoms NEC     Fatigue with low blood pressure   • Headache(784.0)     Chronic   • Herpes zoster  without mention of complication    • Ischemic cardiomyopathy    • Ischemic heart disease    • Long term (current) use of anticoagulants    • Other abnormal glucose    • Primary hypercoagulable state (CMS-HCC)     History of positive Factor V Leiden   • Pulmonary embolism (CMS-HCC)     Complication of cardiac catherization   • Pure hypercholesterolemia    • Rotator cuff (capsule) sprain    • Valvular heart disease      Past Surgical History:   Procedure Laterality Date   • ANGIOPLASTY  2003     Family History   Problem Relation Age of Onset   • Heart Disease Mother    • Arthritis Mother    • Other Sister      overweight   • Other Brother      tremors   • Clotting Disorder Brother      Factor V     History   Smoking Status   • Never Smoker   Smokeless Tobacco   • Never Used     Allergies   Allergen Reactions   • Finasteride Swelling     Hand swelling, stopped medications after taking for three days   • Zetia [Ezetimibe]      N/V     Outpatient Encounter Prescriptions as of 2/1/2018   Medication Sig Dispense Refill   • warfarin (COUMADIN) 7.5 MG Tab TAKE 1 TABLET DAILY OR AS DIRECTED BY CLINIC 90 Tab 1   • lisinopril (PRINIVIL) 5 MG Tab Take 1 Tab by mouth as needed. 90 Tab 3   • pravastatin (PRAVACHOL) 20 MG Tab TAKE 1 TABLET TWICE A  Tab 3   • warfarin (COUMADIN) 5 MG Tab Take 1-2 tabs po q day or as directed by clinic 90 Tab 3   • vitamin D (CHOLECALCIFEROL) 1000 UNIT TABS Take 5,000 Units by mouth every day.     • B Complex Vitamins (VITAMIN B COMPLEX PO) Take 1 Tab by mouth every day.       No facility-administered encounter medications on file as of 2/1/2018.      Review of Systems   Constitutional: Negative for fever and malaise/fatigue.   Respiratory: Negative for cough and shortness of breath.    Cardiovascular: Positive for chest pain (during episode of HTN). Negative for palpitations, orthopnea, claudication, leg swelling and PND.   Gastrointestinal: Negative for abdominal pain.   Musculoskeletal:  "Negative for myalgias.   Neurological: Positive for headaches. Negative for dizziness.   All other systems reviewed and are negative.       Objective:   /72   Pulse 80   Ht 1.803 m (5' 11\")   Wt 102.1 kg (225 lb)   SpO2 92%   BMI 31.38 kg/m²      Physical Exam   Constitutional: He is oriented to person, place, and time. He appears well-developed and well-nourished.   HENT:   Head: Normocephalic and atraumatic.   Eyes: EOM are normal.   Neck: Normal range of motion. Neck supple. No JVD present.   Cardiovascular: Normal rate, regular rhythm, normal heart sounds and intact distal pulses.    No murmur heard.  Pulmonary/Chest: Effort normal and breath sounds normal. No respiratory distress. He has no wheezes. He has no rales.   Abdominal: Soft. Bowel sounds are normal.   Musculoskeletal: Normal range of motion. He exhibits no edema.   Neurological: He is alert and oriented to person, place, and time.   Skin: Skin is warm and dry.   Psychiatric: He has a normal mood and affect.   Nursing note and vitals reviewed.          Lab Results   Component Value Date/Time    CHOLSTRLTOT 151 12/22/2017 08:42 AM    CHOLSTRLTOT 147 07/01/2014 03:09 AM    LDL 88 12/22/2017 08:42 AM    LDL 86 07/01/2014 03:09 AM    HDL 35 (L) 12/22/2017 08:42 AM    HDL 31 (A) 07/01/2014 03:09 AM    TRIGLYCERIDE 142 12/22/2017 08:42 AM    TRIGLYCERIDE 150 (H) 07/01/2014 03:09 AM       Lab Results   Component Value Date/Time    SODIUM 138 12/22/2017 08:42 AM    SODIUM 139 07/01/2014 03:09 AM    POTASSIUM 4.4 12/22/2017 08:42 AM    POTASSIUM 4.0 07/01/2014 03:09 AM    CHLORIDE 99 12/22/2017 08:42 AM    CHLORIDE 109 07/01/2014 03:09 AM    CO2 25 12/22/2017 08:42 AM    CO2 24 07/01/2014 03:09 AM    GLUCOSE 95 12/22/2017 08:42 AM    GLUCOSE 104 (H) 07/01/2014 03:09 AM    BUN 25 12/22/2017 08:42 AM    BUN 21 07/01/2014 03:09 AM    CREATININE 1.12 12/22/2017 08:42 AM    CREATININE 0.99 07/01/2014 03:09 AM    BUNCREATRAT 22 12/22/2017 08:42 AM     Lab " Results   Component Value Date/Time    ALKPHOSPHAT 29 (L) 12/22/2017 08:42 AM    ALKPHOSPHAT 18 (L) 09/12/2012 09:14 AM    ASTSGOT 17 12/22/2017 08:42 AM    ASTSGOT 20 09/12/2012 09:14 AM    ALTSGPT 16 12/22/2017 08:42 AM    ALTSGPT 23 09/12/2012 09:14 AM    TBILIRUBIN 0.5 12/22/2017 08:42 AM    TBILIRUBIN 0.7 09/12/2012 09:14 AM        PET Stress Test 2/1/17   ELECTROCARDIOGRAPHIC FINDINGS:  At rest patient has sinus rhythm.  With   dipyridamole infusion, first degree AV block was noted, no evidence of   ischemia.     SCINTOGRAPHIC FINDINGS:  The QC data for the scan was reviewed and was within   acceptable limits.  In both stress and rest imaging, there is normal   myocardial perfusion.  No evidence of ischemia or infarction.     GATED WALL MOTION FINDINGS:  By gated test, there is normal regional wall   motion with a measured ejection fraction of 70%.     CONCLUSIONS AND IMPRESSIONS:  Negative or normal PET perfusion imaging for   ischemia.  No evidence of ischemia or infarction.  Normal wall motion and   resting ejection fraction.    Echo 7/1/14  CONCLUSIONS  Normal left ventricular systolic function.  Moderate left ventricular hypertrophy.  Grade II diastolic dysfunction.  Moderately dilated right atrium.  Trace mitral regurgitation.  Aortic annular calcification.  Mild tricuspid regurgitation      Transthoracic Echo Report 5/17/17  Compared to the report of the study done on07/01/2014 there has been no   significant change.  Normal left ventricular systolic function.   No evidence of valvular abnormality based on Doppler evaluation.     Assessment:     1. Essential hypertension  [facility] EKG (Clinic Performed)   2. Hyperlipidemia, unspecified hyperlipidemia type     3. Hypercoagulable state (CMS-[facility])     4. Long term current use of anticoagulant therapy     5. Factor V Leiden (CMS-[facility])         Medical Decision Making:  Today's Assessment / Status / Plan:   1. HTN: Today in clinic his blood pressure is down  to his normal range. His wrist BP cuff was compared to the clinic and is accurate. EKG did not show any changes. Sinus rhythm 68 with a first-degree heart block.  -Will have patient continue lisinopril 5-10 mg as needed for elevated blood pressure.  -Patient to continue to Monitor and log Blood pressures at home. Call office or RTC if BP increasing or >180/100 or if symptoms of elevated blood pressure present. Reviewed s/sx of stroke and heart attack. Patient to go to ER or call 911 if present.     2. Hyperlipidemia: Last LDL was 88 on 12/22/17  -Continue pravastatin 20 mg nightly    3. Factor V leiden:  -Continue warfarin, followed by the anticoagulation clinic    FU in clinic in 4 months with Dr. Luong. Sooner if needed.    Patient verbalizes understanding and agrees with the plan of care.     Collaborating MD: MD Delores Pereira M.D.  [address] [place] [address] [address]  [address]  [place] [address]  VIA Facsimile: [phone]

## 2018-02-05 ENCOUNTER — ANTICOAGULATION MONITORING (OUTPATIENT)
Dept: VASCULAR LAB | Facility: MEDICAL CENTER | Age: 83
End: 2018-02-05

## 2018-02-05 LAB — INR PPP: 3 (ref 2–3.5)

## 2018-02-05 NOTE — PROGRESS NOTES
OP Anticoagulation Telephone Note    Date: 2/5/2018  Anticoagulation Summary  As of 2/5/2018    INR goal:   2.0-3.0   TTR:   65.6 % (2.7 y)   Today's INR:   3.0   Maintenance plan:   10 mg (5 mg x 2) on Wed, Sat; 7.5 mg (7.5 mg x 1) all other days   Weekly total:   57.5 mg   No change documented:   Dayan Currie, Med Ass't   Plan last modified:   Susy Dominique A.P.NBradley (6/16/2015)   Next INR check:   3/5/2018   Target end date:   Indefinite    Indications    Pulmonary embolism (CMS-HCC) [I26.99]  Stroke [434.91] [I63.9]             Anticoagulation Episode Summary     INR check location:       Preferred lab:       Send INR reminders to:       Comments:   Marcial MCDANIEL      Anticoagulation Care Providers     Provider Role Specialty Phone number    Adolfo Nguyen M.D. Referring Cardiology [phone]    Susy Dominique A.P.NBradley Responsible Cardiology [phone]    SJ DarlingPBradleyNBradley Responsible Cardiology [phone]        Anticoagulation Patient Findings  Patient Findings     Negatives:   Signs/symptoms of thrombosis, Signs/symptoms of bleeding, Laboratory test error suspected, Change in health, Change in alcohol use, Change in activity, Upcoming invasive procedure, Emergency department visit, Upcoming dental procedure, Missed doses, Extra doses, Change in medications, Change in diet/appetite, Hospital admission, Bruising, Other complaints      Plan:  Spoke with patient's wife on the phone. Patient is therapeutic today. Patient denies any changes in medications or diet. Patient denies any signs or symptoms of bleeding or clotting. Instructed patient to call clinic if any unusual bleeding or bruising occurs. Will continue dosing as outlined above. Will follow-up with patient in 4 weeks.    Dayan Currie, Medical Assistant  I have reviewed and agree with the plan above on  2/5/2018    Maricel Matos, Pharm D

## 2018-03-13 LAB — INR PPP: 2.5 (ref 2–3.5)

## 2018-03-14 ENCOUNTER — ANTICOAGULATION MONITORING (OUTPATIENT)
Dept: VASCULAR LAB | Facility: MEDICAL CENTER | Age: 83
End: 2018-03-14

## 2018-03-14 DIAGNOSIS — I26.99 OTHER PULMONARY EMBOLISM WITHOUT ACUTE COR PULMONALE, UNSPECIFIED CHRONICITY (HCC): ICD-10-CM

## 2018-03-14 NOTE — PROGRESS NOTES
OP Anticoagulation Telephone Note    Date: 3/14/2018  Anticoagulation Summary  As of 3/14/2018    INR goal:   2.0-3.0   TTR:   66.8 % (2.8 y)   Today's INR:   2.5 (3/13/2018)   Maintenance plan:   10 mg (5 mg x 2) on Wed, Sat; 7.5 mg (7.5 mg x 1) all other days   Weekly total:   57.5 mg   No change documented:   Dayan Currie, Med Ass't   Plan last modified:   Susy Dominique A.P.NBradley (6/16/2015)   Next INR check:   4/11/2018   Target end date:   Indefinite    Indications    Pulmonary embolism (CMS-HCC) [I26.99]  Stroke [434.91] [I63.9]             Anticoagulation Episode Summary     INR check location:       Preferred lab:       Send INR reminders to:       Comments:   Marcial MCDANIEL      Anticoagulation Care Providers     Provider Role Specialty Phone number    Adolfo Nguyen M.D. Referring Cardiology [phone]    Susy Dominique A.P.NBradley Responsible Cardiology [phone]    SJ DarlingPJASMINE Responsible Cardiology [phone]        Anticoagulation Patient Findings  Patient Findings     Negatives:   Signs/symptoms of thrombosis, Signs/symptoms of bleeding, Laboratory test error suspected, Change in health, Change in alcohol use, Change in activity, Upcoming invasive procedure, Emergency department visit, Upcoming dental procedure, Missed doses, Extra doses, Change in medications, Change in diet/appetite, Hospital admission, Bruising, Other complaints      Plan:  Spoke with patient on the phone. Patient is therapeutic today. Patient denies any changes in medications or diet. Patient denies any signs or symptoms of bleeding or clotting. Instructed patient to call clinic if any unusual bleeding or bruising occurs. Will continue dosing as outlined above. Will follow-up with patient in 4 weeks.    Dayan Currie, Medical Assistant    I have reviewed and am in agreement with the above stated plan on 3-14-18.  Lamont Will, LawrenceD

## 2018-04-17 LAB — INR PPP: 2.8 (ref 2–3.5)

## 2018-04-18 ENCOUNTER — ANTICOAGULATION MONITORING (OUTPATIENT)
Dept: VASCULAR LAB | Facility: MEDICAL CENTER | Age: 83
End: 2018-04-18

## 2018-04-18 DIAGNOSIS — I63.9 CEREBROVASCULAR ACCIDENT (CVA), UNSPECIFIED MECHANISM (HCC): ICD-10-CM

## 2018-04-18 DIAGNOSIS — I26.99 OTHER PULMONARY EMBOLISM WITHOUT ACUTE COR PULMONALE, UNSPECIFIED CHRONICITY (HCC): ICD-10-CM

## 2018-04-18 NOTE — PROGRESS NOTES
Anticoagulation Summary  As of 4/18/2018    INR goal:   2.0-3.0   TTR:   67.9 % (2.9 y)   Today's INR:   2.8 (4/17/2018)   Maintenance plan:   10 mg (5 mg x 2) on Wed, Sat; 7.5 mg (7.5 mg x 1) all other days   Weekly total:   57.5 mg   No change documented:   Gisselle BOYKIN'Raymarley, Med Ass't   Plan last modified:   Susy Dominique A.P.NBradely (6/16/2015)   Next INR check:   5/15/2018   Target end date:   Indefinite    Indications    Pulmonary embolism (CMS-HCC) [I26.99]  Stroke [434.91] [I63.9]             Anticoagulation Episode Summary     INR check location:       Preferred lab:       Send INR reminders to:       Comments:   Marcial MCDANIEL      Anticoagulation Care Providers     Provider Role Specialty Phone number    Adolfo Nguyen M.D. Referring Cardiology [phone]    SJ LearyPBradleyNBradley Responsible Cardiology [phone]    SAMUEL Darling Responsible Cardiology [phone]        Anticoagulation Patient Findings  Patient Findings     Negatives:   Signs/symptoms of thrombosis, Signs/symptoms of bleeding, Laboratory test error suspected, Change in health, Change in alcohol use, Change in activity, Upcoming invasive procedure, Emergency department visit, Upcoming dental procedure, Missed doses, Extra doses, Change in medications, Change in diet/appetite, Hospital admission, Bruising, Other complaints        Left voicemail message to report therapeutic INR of 2.8.  Patient to continue with current warfarin dosing regimen. Requested pt contact the clinic for any change to diet or medication, and to report any signs or symptoms of bleeding, bruising or clotting.  Pt to follow up in 4 weeks.    Gisselle Pires, Med Ass't     Agree with above plan.    SJ DarlingPJASMINE

## 2018-04-27 ENCOUNTER — OFFICE VISIT (OUTPATIENT)
Dept: CARDIOLOGY | Facility: MEDICAL CENTER | Age: 83
End: 2018-04-27
Payer: MEDICARE

## 2018-04-27 VITALS
SYSTOLIC BLOOD PRESSURE: 120 MMHG | DIASTOLIC BLOOD PRESSURE: 80 MMHG | HEIGHT: 71 IN | WEIGHT: 210 LBS | OXYGEN SATURATION: 94 % | BODY MASS INDEX: 29.4 KG/M2 | HEART RATE: 68 BPM

## 2018-04-27 DIAGNOSIS — D68.51 FACTOR V LEIDEN (HCC): ICD-10-CM

## 2018-04-27 DIAGNOSIS — I10 ESSENTIAL HYPERTENSION: ICD-10-CM

## 2018-04-27 DIAGNOSIS — H81.09 MENIERE'S DISEASE, UNSPECIFIED LATERALITY: ICD-10-CM

## 2018-04-27 DIAGNOSIS — E78.5 HYPERLIPIDEMIA, UNSPECIFIED HYPERLIPIDEMIA TYPE: ICD-10-CM

## 2018-04-27 DIAGNOSIS — I25.9 ISCHEMIC HEART DISEASE: ICD-10-CM

## 2018-04-27 PROCEDURE — 99214 OFFICE O/P EST MOD 30 MIN: CPT | Performed by: INTERNAL MEDICINE

## 2018-04-27 ASSESSMENT — ENCOUNTER SYMPTOMS
COUGH: 0
SPEECH CHANGE: 0
PND: 0
CHILLS: 0
CLAUDICATION: 0
MYALGIAS: 0
PALPITATIONS: 0
DEPRESSION: 0
HEADACHES: 0
EYE PAIN: 0
VOMITING: 0
WEIGHT LOSS: 0
SENSORY CHANGE: 0
EYE DISCHARGE: 0
LOSS OF CONSCIOUSNESS: 0
BRUISES/BLEEDS EASILY: 0
NAUSEA: 0
DOUBLE VISION: 0
ABDOMINAL PAIN: 0
DIZZINESS: 1
SHORTNESS OF BREATH: 0
FEVER: 0
BLOOD IN STOOL: 0
FALLS: 0
BLURRED VISION: 0
HALLUCINATIONS: 0
ORTHOPNEA: 0

## 2018-04-27 NOTE — PROGRESS NOTES
Chief Complaint   Patient presents with   • HTN (Uncontrolled)     F/V: 4 MO       Subjective:   Michael Lockhart is a 83 y.o. male who presents today for cardiac care of CAD (MI in 2003), HTN, HLP, Factor V Leiden, history of PE.      At prior visit, patient reported of feeling some chest pain at random times. Pressure-like sensation. No shortness of breath. We perform myocardial PET scan stress test was negative for coronary ischemia. LVEF on that test was about 70%.     Still chronic dizziness due to Meniere's disease and visual problems. Has been [facility] at [facility].    Past Medical History:   Diagnosis Date   • Acute, but ill-defined, cerebrovascular disease    • CAD (coronary artery disease)    • Chest pain, unspecified     History of recurrent chest pain withput evidence of acive ischemia by thallium   • General symptoms NEC     Fatigue with low blood pressure   • Headache(784.0)     Chronic   • Herpes zoster without mention of complication    • Ischemic cardiomyopathy    • Ischemic heart disease    • Long term (current) use of anticoagulants    • Other abnormal glucose    • Primary hypercoagulable state (CMS-HCC)     History of positive Factor V Leiden   • Pulmonary embolism (CMS-[facility])     Complication of cardiac catherization   • Pure hypercholesterolemia    • Rotator cuff (capsule) sprain    • Valvular heart disease      Past Surgical History:   Procedure Laterality Date   • ANGIOPLASTY  2003     Family History   Problem Relation Age of Onset   • Heart Disease Mother    • Arthritis Mother    • Other Sister      overweight   • Other Brother      tremors   • Clotting Disorder Brother      Factor V     Social History     Social History   • Marital status:      Spouse name: N/A   • Number of children: N/A   • Years of education: N/A     Occupational History   • Not on file.     Social History Main Topics   • Smoking status: Never Smoker   • Smokeless tobacco: Never Used   • Alcohol use No   • Drug use: No    • Sexual activity: Not on file     Other Topics Concern   • Not on file     Social History Narrative   • No narrative on file     Allergies   Allergen Reactions   • Finasteride Swelling     Hand swelling, stopped medications after taking for three days   • Zetia [Ezetimibe]      N/V     Outpatient Encounter Prescriptions as of 4/27/2018   Medication Sig Dispense Refill   • warfarin (COUMADIN) 7.5 MG Tab TAKE 1 TABLET DAILY OR AS DIRECTED BY CLINIC 90 Tab 1   • lisinopril (PRINIVIL) 5 MG Tab Take 1 Tab by mouth as needed. 90 Tab 3   • pravastatin (PRAVACHOL) 20 MG Tab TAKE 1 TABLET TWICE A  Tab 3   • warfarin (COUMADIN) 5 MG Tab Take 1-2 tabs po q day or as directed by clinic 90 Tab 3   • vitamin D (CHOLECALCIFEROL) 1000 UNIT TABS Take 5,000 Units by mouth every day.     • B Complex Vitamins (VITAMIN B COMPLEX PO) Take 1 Tab by mouth every day.       No facility-administered encounter medications on file as of 4/27/2018.      Review of Systems   Constitutional: Negative for chills, fever, malaise/fatigue and weight loss.   HENT: Negative for ear discharge, ear pain, hearing loss and nosebleeds.    Eyes: Negative for blurred vision, double vision, pain and discharge.   Respiratory: Negative for cough and shortness of breath.    Cardiovascular: Negative for chest pain, palpitations, orthopnea, claudication, leg swelling and PND.   Gastrointestinal: Negative for abdominal pain, blood in stool, melena, nausea and vomiting.   Genitourinary: Negative for dysuria and hematuria.   Musculoskeletal: Negative for falls, joint pain and myalgias.   Skin: Negative for itching and rash.   Neurological: Positive for dizziness. Negative for sensory change, speech change, loss of consciousness and headaches.   Endo/Heme/Allergies: Negative for environmental allergies. Does not bruise/bleed easily.   Psychiatric/Behavioral: Negative for depression, hallucinations and suicidal ideas.        Objective:   /80   Pulse 68    "Ht 1.803 m (5' 11\")   Wt 95.3 kg (210 lb)   SpO2 94%   BMI 29.29 kg/m²     Physical Exam   Constitutional: He is oriented to person, place, and time. No distress.   HENT:   Head: Normocephalic and atraumatic.   Right Ear: External ear normal.   Left Ear: External ear normal.   Eyes: Right eye exhibits no discharge. Left eye exhibits no discharge.   Neck: No JVD present. No thyromegaly present.   Cardiovascular: Normal rate, regular rhythm, normal heart sounds and intact distal pulses.  Exam reveals no gallop and no friction rub.    No murmur heard.  Pulmonary/Chest: Breath sounds normal. No respiratory distress.   Abdominal: Bowel sounds are normal. He exhibits no distension. There is no tenderness.   Musculoskeletal: He exhibits no edema or tenderness.   Neurological: He is alert and oriented to person, place, and time. No cranial nerve deficit.   Skin: Skin is warm and dry. He is not diaphoretic.   Psychiatric: He has a normal mood and affect. His behavior is normal.   Nursing note and vitals reviewed.      Assessment:     1. Essential hypertension  COMP METABOLIC PANEL   2. Hyperlipidemia, unspecified hyperlipidemia type  LIPID PANEL   3. Ischemic heart disease  COMP METABOLIC PANEL   4. Factor V Leiden (CMS-HCC)     5. Meniere's disease, unspecified laterality         Medical Decision Making:  Today's Assessment / Status / Plan:   At this time patient is clinically stable in terms of his cardiac standpoint.  Cont current medications at current dose.     I will see patient back in clinic with lab tests and studies results in 6 months.    I thank you Dr. Michael for referring patient to our Cardiology Clinic today.    "

## 2018-04-27 NOTE — LETTER
[facility]   [address], [address]  [place] [address]  Phone: [phone]  Fax: [phone]              Michael Lockhart  1935    Encounter Date: 4/27/2018    Cal Luong M.D.          PROGRESS NOTE:  Chief Complaint   Patient presents with   • HTN (Uncontrolled)     F/V: 4 MO       Subjective:   Michael Lockhart is a 83 y.o. male who presents today for cardiac care of CAD (MI in 2003), HTN, HLP, Factor V Leiden, history of PE.      At prior visit, patient reported of feeling some chest pain at random times. Pressure-like sensation. No shortness of breath. We perform myocardial PET scan stress test was negative for coronary ischemia. LVEF on that test was about 70%.     Still chronic dizziness due to Meniere's disease and visual problems. Has been seen at [facility].    Past Medical History:   Diagnosis Date   • Acute, but ill-defined, cerebrovascular disease    • CAD (coronary artery disease)    • Chest pain, unspecified     History of recurrent chest pain withput evidence of acive ischemia by thallium   • General symptoms NEC     Fatigue with low blood pressure   • Headache(784.0)     Chronic   • Herpes zoster without mention of complication    • Ischemic cardiomyopathy    • Ischemic heart disease    • Long term (current) use of anticoagulants    • Other abnormal glucose    • Primary hypercoagulable state (CMS-HCC)     History of positive Factor V Leiden   • Pulmonary embolism (CMS-[facility])     Complication of cardiac catherization   • Pure hypercholesterolemia    • Rotator cuff (capsule) sprain    • Valvular heart disease      Past Surgical History:   Procedure Laterality Date   • ANGIOPLASTY  2003     Family History   Problem Relation Age of Onset   • Heart Disease Mother    • Arthritis Mother    • Other Sister      overweight   • Other Brother      tremors   • Clotting Disorder Brother      Factor V     Social History     Social History   • Marital status:       Spouse name: N/A   • Number of children: N/A   • Years of education: N/A     Occupational History   • Not on file.     Social History Main Topics   • Smoking status: Never Smoker   • Smokeless tobacco: Never Used   • Alcohol use No   • Drug use: No   • Sexual activity: Not on file     Other Topics Concern   • Not on file     Social History Narrative   • No narrative on file     Allergies   Allergen Reactions   • Finasteride Swelling     Hand swelling, stopped medications after taking for three days   • Zetia [Ezetimibe]      N/V     Outpatient Encounter Prescriptions as of 4/27/2018   Medication Sig Dispense Refill   • warfarin (COUMADIN) 7.5 MG Tab TAKE 1 TABLET DAILY OR AS DIRECTED BY CLINIC 90 Tab 1   • lisinopril (PRINIVIL) 5 MG Tab Take 1 Tab by mouth as needed. 90 Tab 3   • pravastatin (PRAVACHOL) 20 MG Tab TAKE 1 TABLET TWICE A  Tab 3   • warfarin (COUMADIN) 5 MG Tab Take 1-2 tabs po q day or as directed by clinic 90 Tab 3   • vitamin D (CHOLECALCIFEROL) 1000 UNIT TABS Take 5,000 Units by mouth every day.     • B Complex Vitamins (VITAMIN B COMPLEX PO) Take 1 Tab by mouth every day.       No facility-administered encounter medications on file as of 4/27/2018.      Review of Systems   Constitutional: Negative for chills, fever, malaise/fatigue and weight loss.   HENT: Negative for ear discharge, ear pain, hearing loss and nosebleeds.    Eyes: Negative for blurred vision, double vision, pain and discharge.   Respiratory: Negative for cough and shortness of breath.    Cardiovascular: Negative for chest pain, palpitations, orthopnea, claudication, leg swelling and PND.   Gastrointestinal: Negative for abdominal pain, blood in stool, melena, nausea and vomiting.   Genitourinary: Negative for dysuria and hematuria.   Musculoskeletal: Negative for falls, joint pain and myalgias.   Skin: Negative for itching and rash.   Neurological: Positive for dizziness. Negative for sensory change, speech  "change, loss of consciousness and headaches.   Endo/Heme/Allergies: Negative for environmental allergies. Does not bruise/bleed easily.   Psychiatric/Behavioral: Negative for depression, hallucinations and suicidal ideas.        Objective:   /80   Pulse 68   Ht 1.803 m (5' 11\")   Wt 95.3 kg (210 lb)   SpO2 94%   BMI 29.29 kg/m²      Physical Exam   Constitutional: He is oriented to person, place, and time. No distress.   HENT:   Head: Normocephalic and atraumatic.   Right Ear: External ear normal.   Left Ear: External ear normal.   Eyes: Right eye exhibits no discharge. Left eye exhibits no discharge.   Neck: No JVD present. No thyromegaly present.   Cardiovascular: Normal rate, regular rhythm, normal heart sounds and intact distal pulses.  Exam reveals no gallop and no friction rub.    No murmur heard.  Pulmonary/Chest: Breath sounds normal. No respiratory distress.   Abdominal: Bowel sounds are normal. He exhibits no distension. There is no tenderness.   Musculoskeletal: He exhibits no edema or tenderness.   Neurological: He is alert and oriented to person, place, and time. No cranial nerve deficit.   Skin: Skin is warm and dry. He is not diaphoretic.   Psychiatric: He has a normal mood and affect. His behavior is normal.   Nursing note and vitals reviewed.      Assessment:     1. Essential hypertension  COMP METABOLIC PANEL   2. Hyperlipidemia, unspecified hyperlipidemia type  LIPID PANEL   3. Ischemic heart disease  COMP METABOLIC PANEL   4. Factor V Leiden (CMS-[facility])     5. Meniere's disease, unspecified laterality         Medical Decision Making:  Today's Assessment / Status / Plan:   At this time patient is clinically stable in terms of his cardiac standpoint.  Cont current medications at current dose.     I will see patient back in clinic with lab tests and studies results in 6 months.    I thank you Dr. Michael for referring patient to our Cardiology Clinic today.        Delores Michael, " M.D.  [id number]  [address]  [place] [address]  VIA Facsimile: [phone]

## 2018-05-21 LAB — INR PPP: 2.2 (ref 2–3.5)

## 2018-05-22 ENCOUNTER — ANTICOAGULATION MONITORING (OUTPATIENT)
Dept: VASCULAR LAB | Facility: MEDICAL CENTER | Age: 83
End: 2018-05-22

## 2018-05-22 DIAGNOSIS — I26.99 OTHER PULMONARY EMBOLISM WITHOUT ACUTE COR PULMONALE, UNSPECIFIED CHRONICITY (HCC): ICD-10-CM

## 2018-05-22 DIAGNOSIS — I63.9 CEREBROVASCULAR ACCIDENT (CVA), UNSPECIFIED MECHANISM (HCC): ICD-10-CM

## 2018-05-22 NOTE — PROGRESS NOTES
Anticoagulation Summary  As of 5/22/2018    INR goal:   2.0-3.0   TTR:   68.9 % (3 y)   Today's INR:   2.2 (5/21/2018)   Warfarin maintenance plan:   10 mg (5 mg x 2) on Wed, Sat; 7.5 mg (7.5 mg x 1) all other days   Weekly warfarin total:   57.5 mg   No change documented:   Gisselle BOYKIN'Cheryle, Med Ass't   Plan last modified:   Susy Dominique A.P.N. (6/16/2015)   Next INR check:   6/18/2018   Target end date:   Indefinite    Indications    Pulmonary embolism (HCC) [I26.99]  Stroke [434.91] [I63.9]             Anticoagulation Episode Summary     INR check location:       Preferred lab:       Send INR reminders to:       Comments:   Marcial MCDANIEL      Anticoagulation Care Providers     Provider Role Specialty Phone number    Adolfo Nguyen M.D. Referring Cardiology [phone]    Susy Dominique A.P.N. Responsible Cardiology [phone]    SJ DarlingP.NBradley Responsible Cardiology [phone]        Anticoagulation Patient Findings  Patient Findings     Negatives:   Signs/symptoms of thrombosis, Signs/symptoms of bleeding, Laboratory test error suspected, Change in health, Change in alcohol use, Change in activity, Upcoming invasive procedure, Emergency department visit, Upcoming dental procedure, Missed doses, Extra doses, Change in medications, Change in diet/appetite, Hospital admission, Bruising, Other complaints        Spoke with patient to report a therapeutic INR.  Pt instructed to continue with current warfarin dosing regimen. Pt denies any s/s of bleeding, bruising, clotting or any changes to diet or medication.  Will follow up in 4 weeks.    Gisselle OLIVERRaymarley, Med Ass't      I have reviewed and agree with the plan above on 05/22/2018      Evie Barron, PharmD

## 2018-06-22 ENCOUNTER — ANTICOAGULATION MONITORING (OUTPATIENT)
Dept: VASCULAR LAB | Facility: MEDICAL CENTER | Age: 83
End: 2018-06-22

## 2018-06-22 DIAGNOSIS — I63.9 CEREBROVASCULAR ACCIDENT (CVA), UNSPECIFIED MECHANISM (HCC): ICD-10-CM

## 2018-06-22 DIAGNOSIS — I26.99 OTHER PULMONARY EMBOLISM WITHOUT ACUTE COR PULMONALE, UNSPECIFIED CHRONICITY (HCC): ICD-10-CM

## 2018-06-22 LAB — INR PPP: 2.7 (ref 2–3.5)

## 2018-06-22 NOTE — PROGRESS NOTES
Anticoagulation Summary  As of 6/22/2018    INR goal:   2.0-3.0   TTR:   69.8 % (3.1 y)   Today's INR:   2.7   Warfarin maintenance plan:   10 mg (5 mg x 2) on Wed, Sat; 7.5 mg (7.5 mg x 1) all other days   Weekly warfarin total:   57.5 mg   Plan last modified:   Susy Dominique, A.P.N. (6/16/2015)   Next INR check:   7/20/2018   Target end date:   Indefinite    Indications    Pulmonary embolism (HCC) [I26.99]  Stroke [434.91] [I63.9]             Anticoagulation Episode Summary     INR check location:       Preferred lab:       Send INR reminders to:       Comments:   Marcial MCDANIEL      Anticoagulation Care Providers     Provider Role Specialty Phone number    Adolfo Nguyen M.D. Referring Cardiology [phone]    Susy Dominique A.P.N. Responsible Cardiology [phone]    Emily Taylor A.P.NBradley Responsible Cardiology [phone]        Anticoagulation Patient Findings  Patient Findings     Negatives:   Signs/symptoms of thrombosis, Signs/symptoms of bleeding, Laboratory test error suspected, Change in health, Change in alcohol use, Change in activity, Upcoming invasive procedure, Emergency department visit, Upcoming dental procedure, Missed doses, Extra doses, Change in medications, Change in diet/appetite, Hospital admission, Bruising, Other complaints        Spoke with patient today regarding therapeutic INR of 2.7.  Patient denies any signs/symptoms of bruising or bleeding or any changes in diet and medications.  Instructed patient to call clinic with any questions or concerns.  Pt is to continue with current warfarin dosing regimen.  Follow up in 4 weeks, to reduce risk of adverse events related to this high risk medication,  Warfarin.    Lamont Will, PharmD

## 2018-06-26 LAB
ALBUMIN SERPL-MCNC: 4.2 G/DL (ref 3.5–4.7)
ALBUMIN/GLOB SERPL: 2 {RATIO} (ref 1.2–2.2)
ALP SERPL-CCNC: 28 IU/L (ref 39–117)
ALT SERPL-CCNC: 26 IU/L (ref 0–44)
AST SERPL-CCNC: 22 IU/L (ref 0–40)
BILIRUB SERPL-MCNC: 0.4 MG/DL (ref 0–1.2)
BUN SERPL-MCNC: 19 MG/DL (ref 8–27)
BUN/CREAT SERPL: 21 (ref 10–24)
CALCIUM SERPL-MCNC: 9.4 MG/DL (ref 8.6–10.2)
CHLORIDE SERPL-SCNC: 104 MMOL/L (ref 96–106)
CHOLEST SERPL-MCNC: 134 MG/DL (ref 100–199)
CO2 SERPL-SCNC: 26 MMOL/L (ref 20–29)
CREAT SERPL-MCNC: 0.92 MG/DL (ref 0.76–1.27)
GLOBULIN SER CALC-MCNC: 2.1 G/DL (ref 1.5–4.5)
GLUCOSE SERPL-MCNC: 94 MG/DL (ref 65–99)
HDLC SERPL-MCNC: 39 MG/DL
IF AFRICAN AMERICAN  100797: 89 ML/MIN/1.73
IF NON AFRICAN AMER 100791: 77 ML/MIN/1.73
LABORATORY COMMENT REPORT: ABNORMAL
LDLC SERPL CALC-MCNC: 72 MG/DL (ref 0–99)
POTASSIUM SERPL-SCNC: 4.4 MMOL/L (ref 3.5–5.2)
PROT SERPL-MCNC: 6.3 G/DL (ref 6–8.5)
SODIUM SERPL-SCNC: 141 MMOL/L (ref 134–144)
TRIGL SERPL-MCNC: 117 MG/DL (ref 0–149)
VLDLC SERPL CALC-MCNC: 23 MG/DL (ref 5–40)

## 2018-07-20 DIAGNOSIS — Z79.01 LONG TERM CURRENT USE OF ANTICOAGULANT THERAPY: ICD-10-CM

## 2018-07-23 RX ORDER — WARFARIN SODIUM 7.5 MG/1
TABLET ORAL
Qty: 90 TAB | Refills: 1 | Status: SHIPPED | OUTPATIENT
Start: 2018-07-23 | End: 2019-01-18 | Stop reason: SDUPTHER

## 2018-07-24 ENCOUNTER — ANTICOAGULATION MONITORING (OUTPATIENT)
Dept: VASCULAR LAB | Facility: MEDICAL CENTER | Age: 83
End: 2018-07-24

## 2018-07-24 DIAGNOSIS — I63.9 CEREBROVASCULAR ACCIDENT (CVA), UNSPECIFIED MECHANISM (HCC): ICD-10-CM

## 2018-07-24 DIAGNOSIS — I26.99 OTHER PULMONARY EMBOLISM WITHOUT ACUTE COR PULMONALE, UNSPECIFIED CHRONICITY (HCC): ICD-10-CM

## 2018-07-24 LAB — INR PPP: 2 (ref 2–3.5)

## 2018-07-24 NOTE — PROGRESS NOTES
OP Anticoagulation Telephone Note    Date: 7/24/2018  Anticoagulation Summary  As of 7/24/2018    INR goal:   2.0-3.0   TTR:   70.6 % (3.2 y)   Today's INR:   2.0   Warfarin maintenance plan:   10 mg (5 mg x 2) on Wed, Sat; 7.5 mg (7.5 mg x 1) all other days   Weekly warfarin total:   57.5 mg   No change documented:   Dayan Currie, Med Ass't   Plan last modified:   Susy Dominique A.P.NBradley (6/16/2015)   Next INR check:   8/7/2018   Target end date:   Indefinite    Indications    Pulmonary embolism (HCC) [I26.99]  Stroke [434.91] [I63.9]             Anticoagulation Episode Summary     INR check location:       Preferred lab:       Send INR reminders to:       Comments:   Marcial MCDANIEL      Anticoagulation Care Providers     Provider Role Specialty Phone number    Adolfo Nguyen M.D. Referring Cardiology [phone]    Susy Dominique A.P.NBradley Responsible Cardiology [phone]    SJ DarlingPJASMINE Responsible Cardiology [phone]        Anticoagulation Patient Findings  Patient Findings     Negatives:   Signs/symptoms of thrombosis, Signs/symptoms of bleeding, Laboratory test error suspected, Change in health, Change in alcohol use, Change in activity, Upcoming invasive procedure, Emergency department visit, Upcoming dental procedure, Missed doses, Extra doses, Change in medications, Change in diet/appetite, Hospital admission, Bruising, Other complaints      Plan:  Spoke with patient on the phone. Patient is therapeutic today. Patient denies any changes in medications or diet. Patient denies any signs or symptoms of bleeding or clotting. Instructed patient to call clinic if any unusual bleeding or bruising occurs. Will continue dosing as outlined above. Will follow-up with patient in 2 weeks.    Dayan Currie, Medical Assistant    I have reviewed and am in agreement with the above stated plan on 7-24-18.  Lamont Will, LawrenceD

## 2018-08-09 LAB — INR PPP: 2.4 (ref 2–3.5)

## 2018-08-21 ENCOUNTER — ANTICOAGULATION MONITORING (OUTPATIENT)
Dept: VASCULAR LAB | Facility: MEDICAL CENTER | Age: 83
End: 2018-08-21

## 2018-08-21 DIAGNOSIS — I26.99 OTHER PULMONARY EMBOLISM WITHOUT ACUTE COR PULMONALE, UNSPECIFIED CHRONICITY (HCC): ICD-10-CM

## 2018-08-21 DIAGNOSIS — I63.9 CEREBROVASCULAR ACCIDENT (CVA), UNSPECIFIED MECHANISM (HCC): ICD-10-CM

## 2018-08-21 NOTE — PROGRESS NOTES
OP Telephone Anticoagulation Service Note    Date: 8/21/2018      Anticoagulation Summary  As of 8/21/2018    INR goal:   2.0-3.0   TTR:   71.0 % (3.2 y)   Today's INR:   2.4 (8/9/2018)   Warfarin maintenance plan:   10 mg (5 mg x 2) on Wed, Sat; 7.5 mg (7.5 mg x 1) all other days   Weekly warfarin total:   57.5 mg   Plan last modified:   CHRISTIANO Leary.P.NBradley (6/16/2015)   Next INR check:   8/23/2018   Target end date:   Indefinite    Indications    Pulmonary embolism (HCC) [I26.99]  Stroke [434.91] [I63.9]             Anticoagulation Episode Summary     INR check location:       Preferred lab:       Send INR reminders to:       Comments:   Marcial MCDANIEL      Anticoagulation Care Providers     Provider Role Specialty Phone number    Adolfo Nguyen M.D. Referring Cardiology [phone]    SJ LearyPJASMINE Responsible Cardiology [phone]    SJ DarlingPJASMINE Responsible Cardiology [phone]        Anticoagulation Patient Findings        Plan: Spoke with patient on the phone. INR is from 1.5 weeks ago Patient is therapeutic today. Confirmed dosing. No missed tablets in the last week. Patient denies any changes in medications or diet. Patient denies any signs or symptoms of bleeding or clotting. Instructed patient to call clinic if any unusual bleeding or bruising occurs. Will continue dosing as outlined. Will follow-up with patient in 2 week(s) from test date        Maricel Matos, Kasey

## 2018-08-25 LAB — INR PPP: 1.5 (ref 2–3.5)

## 2018-08-27 ENCOUNTER — ANTICOAGULATION MONITORING (OUTPATIENT)
Dept: VASCULAR LAB | Facility: MEDICAL CENTER | Age: 83
End: 2018-08-27

## 2018-08-27 DIAGNOSIS — I63.9 CEREBROVASCULAR ACCIDENT (CVA), UNSPECIFIED MECHANISM (HCC): ICD-10-CM

## 2018-08-27 DIAGNOSIS — I26.99 OTHER PULMONARY EMBOLISM WITHOUT ACUTE COR PULMONALE, UNSPECIFIED CHRONICITY (HCC): ICD-10-CM

## 2018-08-27 NOTE — PROGRESS NOTES
Anticoagulation Summary  As of 8/27/2018    INR goal:   2.0-3.0   TTR:   70.7 % (3.3 y)   Today's INR:   1.5! (8/25/2018)   Warfarin maintenance plan:   10 mg (5 mg x 2) on Wed, Sat; 7.5 mg (7.5 mg x 1) all other days   Weekly warfarin total:   57.5 mg   Plan last modified:   Susy Dominique A.P.NBrdaley (6/16/2015)   Next INR check:   9/1/2018   Target end date:   Indefinite    Indications    Pulmonary embolism (HCC) [I26.99]  Stroke [434.91] [I63.9]             Anticoagulation Episode Summary     INR check location:       Preferred lab:       Send INR reminders to:       Comments:   Marcial MCDANIEL      Anticoagulation Care Providers     Provider Role Specialty Phone number    Adolfo Nguyen M.D. Referring Cardiology [phone]    Susy Dominique A.P.N. Responsible Cardiology [phone]    SJ DarlingPBradleyNBradley Responsible Cardiology [phone]        Anticoagulation Patient Findings    SUB-Therapeutic INR of 1.5.     Spoke with patient via phone. No current signs or symptoms of bleeding. No changes to medications. Confirmed warfarin regimen.     Patient admits to increased vegetable intake which he believes resulted in the lower INR. He took an additional 5mg (15mg total) on 8/25/18. Patient was instructed to continue current dose of warfarin as noted above.     Next INR check 1 week(s) from last INR on 9/1/18.     Discussed recommendation with Maricel Matos PharmD.   Dianelys Curry, Pharmacy Intern  I have reviewed and agree with the plan above on  8/27/2018    Maricel Matos, Pharm D

## 2018-09-11 LAB — INR PPP: 2 (ref 2–3.5)

## 2018-09-12 ENCOUNTER — ANTICOAGULATION MONITORING (OUTPATIENT)
Dept: VASCULAR LAB | Facility: MEDICAL CENTER | Age: 83
End: 2018-09-12

## 2018-09-12 DIAGNOSIS — I26.99 OTHER PULMONARY EMBOLISM WITHOUT ACUTE COR PULMONALE, UNSPECIFIED CHRONICITY (HCC): ICD-10-CM

## 2018-09-12 NOTE — PROGRESS NOTES
OP Anticoagulation Telephone Note    Date: 9/12/2018  Anticoagulation Summary  As of 9/12/2018    INR goal:   2.0-3.0   TTR:   69.7 % (3.3 y)   Today's INR:   2 (9/11/2018)   Warfarin maintenance plan:   10 mg (5 mg x 2) on Wed, Sat; 7.5 mg (7.5 mg x 1) all other days   Weekly warfarin total:   57.5 mg   No change documented:   Dayan Currie, Med Ass't   Plan last modified:   Susy Dominique A.P.NBradley (6/16/2015)   Next INR check:   9/26/2018   Target end date:   Indefinite    Indications    Pulmonary embolism (HCC) [I26.99]  Stroke [434.91] [I63.9]             Anticoagulation Episode Summary     INR check location:       Preferred lab:       Send INR reminders to:       Comments:   Marcial MCDANIEL      Anticoagulation Care Providers     Provider Role Specialty Phone number    Adolfo Nguyen M.D. Referring Cardiology [phone]    Susy Dominique A.P.NBradley Responsible Cardiology [phone]    SAMUEL Darling Responsible Cardiology [phone]        Anticoagulation Patient Findings  Patient Findings     Negatives:   Signs/symptoms of thrombosis, Signs/symptoms of bleeding, Laboratory test error suspected, Change in health, Change in alcohol use, Change in activity, Upcoming invasive procedure, Emergency department visit, Upcoming dental procedure, Missed doses, Extra doses, Change in medications, Change in diet/appetite, Hospital admission, Bruising, Other complaints      Plan:  Spoke with patient's wife on the phone. Patient is therapeutic today. Patient denies any changes in medications or diet. Patient denies any signs or symptoms of bleeding or clotting. Instructed patient to call clinic if any unusual bleeding or bruising occurs. Will continue dosing as outlined above. Will follow-up with patient in 2 weeks.      Dayan Currie, Medical Assistant    I have reviewed and am in agreement with the above stated plan on 9-12-18.  Lamont Will, LawrenceD

## 2018-10-15 ENCOUNTER — ANTICOAGULATION MONITORING (OUTPATIENT)
Dept: MEDICAL GROUP | Facility: MEDICAL CENTER | Age: 83
End: 2018-10-15

## 2018-10-15 DIAGNOSIS — I26.99 OTHER PULMONARY EMBOLISM WITHOUT ACUTE COR PULMONALE, UNSPECIFIED CHRONICITY (HCC): ICD-10-CM

## 2018-10-15 LAB — INR PPP: 3 (ref 2–3.5)

## 2018-10-15 NOTE — PROGRESS NOTES
Anticoagulation Summary  As of 10/15/2018    INR goal:   2.0-3.0   TTR:   70.5 % (3.4 y)   Today's INR:   3   Warfarin maintenance plan:   10 mg (5 mg x 2) on Wed, Sat; 7.5 mg (7.5 mg x 1) all other days   Weekly warfarin total:   57.5 mg   Plan last modified:   Susy Dominique A.P.N. (6/16/2015)   Next INR check:   10/29/2018   Target end date:   Indefinite    Indications    Pulmonary embolism (HCC) [I26.99]  Stroke [434.91] [I63.9]             Anticoagulation Episode Summary     INR check location:       Preferred lab:       Send INR reminders to:       Comments:   Marcial MCDANIEL      Anticoagulation Care Providers     Provider Role Specialty Phone number    Adolfo Nguyen M.D. Referring Cardiology [phone]    Susy Dominique A.P.N. Responsible Cardiology [phone]    SJ DarlingP.NBradley Responsible Cardiology [phone]        Anticoagulation Patient Findings  Patient Findings     Negatives:   Signs/symptoms of thrombosis, Signs/symptoms of bleeding, Laboratory test error suspected, Change in health, Change in alcohol use, Change in activity, Upcoming invasive procedure, Emergency department visit, Upcoming dental procedure, Missed doses, Extra doses, Change in medications, Change in diet/appetite, Hospital admission, Bruising, Other complaints         Spoke with patient today regarding therapeutic INR of 3.0.  Patient denies any signs/symptoms of bruising or bleeding or any changes in diet and medications.  Instructed patient to call clinic with any questions or concerns.  Pt is to continue with current warfarin dosing regimen.  Follow up in 2 weeks, to reduce risk of adverse events related to this high risk medication,  Warfarin.    Lamont Will, PharmD

## 2018-12-05 DIAGNOSIS — E78.5 HYPERLIPIDEMIA, UNSPECIFIED HYPERLIPIDEMIA TYPE: ICD-10-CM

## 2018-12-06 RX ORDER — PRAVASTATIN SODIUM 20 MG
TABLET ORAL
Qty: 180 TAB | Refills: 1 | Status: SHIPPED | OUTPATIENT
Start: 2018-12-06 | End: 2019-05-02 | Stop reason: SDUPTHER

## 2018-12-12 ENCOUNTER — APPOINTMENT (RX ONLY)
Dept: URBAN - METROPOLITAN AREA CLINIC 4 | Facility: CLINIC | Age: 83
Setting detail: DERMATOLOGY
End: 2018-12-12

## 2018-12-12 DIAGNOSIS — L98.8 OTHER SPECIFIED DISORDERS OF THE SKIN AND SUBCUTANEOUS TISSUE: ICD-10-CM

## 2018-12-12 DIAGNOSIS — L30.9 DERMATITIS, UNSPECIFIED: ICD-10-CM

## 2018-12-12 PROCEDURE — ? COUNSELING

## 2018-12-12 PROCEDURE — ? PRESCRIPTION

## 2018-12-12 PROCEDURE — 99212 OFFICE O/P EST SF 10 MIN: CPT

## 2018-12-12 RX ORDER — TRIAMCINOLONE ACETONIDE 1 MG/G
CREAM TOPICAL BID
Qty: 1 | Refills: 2 | Status: ERX | COMMUNITY
Start: 2018-12-12

## 2018-12-12 RX ADMIN — TRIAMCINOLONE ACETONIDE: 1 CREAM TOPICAL at 00:00

## 2018-12-12 ASSESSMENT — LOCATION ZONE DERM
LOCATION ZONE: SCALP
LOCATION ZONE: LIP

## 2018-12-12 ASSESSMENT — LOCATION SIMPLE DESCRIPTION DERM
LOCATION SIMPLE: LEFT LIP
LOCATION SIMPLE: POSTERIOR SCALP

## 2018-12-12 ASSESSMENT — LOCATION DETAILED DESCRIPTION DERM
LOCATION DETAILED: POSTERIOR MID-PARIETAL SCALP
LOCATION DETAILED: LEFT INFERIOR VERMILION LIP

## 2018-12-17 ENCOUNTER — ANTICOAGULATION MONITORING (OUTPATIENT)
Dept: VASCULAR LAB | Facility: MEDICAL CENTER | Age: 83
End: 2018-12-17

## 2018-12-17 LAB — INR PPP: 1.9 (ref 2–3.5)

## 2018-12-18 NOTE — PROGRESS NOTES
Anticoagulation Summary  As of 12/17/2018    INR goal:   2.0-3.0   TTR:   71.5 % (3.6 y)   Today's INR:   1.9!   Warfarin maintenance plan:   10 mg (5 mg x 2) on Wed, Sat; 7.5 mg (7.5 mg x 1) all other days   Weekly warfarin total:   57.5 mg   Plan last modified:   Susy Dominique A.P.NBradley (6/16/2015)   Next INR check:   12/31/2018   Target end date:   Indefinite    Indications    Pulmonary embolism (HCC) [I26.99]  Stroke [434.91] [I63.9]             Anticoagulation Episode Summary     INR check location:       Preferred lab:       Send INR reminders to:       Comments:   Marcial MCDANIEL      Anticoagulation Care Providers     Provider Role Specialty Phone number    Adolfo Nguyen M.D. Referring Cardiology [phone]    CHRISTIANO Leary.P.N. Responsible Cardiology [phone]    SJ DarlingPBradleyNBradley Responsible Cardiology [phone]        Anticoagulation Patient Findings    Spoke with Mrs. Lockhart to report a sub therapeutic INR of 1.9.  Will bolus dose with 10mg tonight, then resume current dosing regimen. Follow up in 2 weeks, to reduce the risk of adverse events related to this high risk medication, warfarin.    Evie Barron, Clinical Pharmacist

## 2018-12-28 ENCOUNTER — ANTICOAGULATION MONITORING (OUTPATIENT)
Dept: VASCULAR LAB | Facility: MEDICAL CENTER | Age: 83
End: 2018-12-28

## 2018-12-28 LAB — INR PPP: 2.5 (ref 2–3.5)

## 2018-12-28 NOTE — PROGRESS NOTES
Anticoagulation Summary  As of 12/28/2018    INR goal:   2.0-3.0   TTR:   71.6 % (3.6 y)   Today's INR:   2.5   Warfarin maintenance plan:   10 mg (5 mg x 2) on Wed, Sat; 7.5 mg (7.5 mg x 1) all other days   Weekly warfarin total:   57.5 mg   Plan last modified:   Susy Dominique A.P.NBradley (6/16/2015)   Next INR check:   1/11/2019   Target end date:   Indefinite    Indications    Pulmonary embolism (HCC) [I26.99]  Stroke [434.91] [I63.9]             Anticoagulation Episode Summary     INR check location:       Preferred lab:       Send INR reminders to:       Comments:   Alere      Anticoagulation Care Providers     Provider Role Specialty Phone number    Adolfo Nguyen M.D. Referring Cardiology [phone]    SJ LearyP.N. Responsible Cardiology [phone]    SJ DarlingPJASMINE Responsible Cardiology [phone]        Anticoagulation Patient Findings      Spoke with patient Michael to report a therapeutic INR.    Pt instructed to continue with current warfarin dosing regimen, confirms dosing.   Pt denies any s/s of bleeding, bruising, clotting or any changes to diet or medication.    Will follow up in 2 week(s).     Joselito Covarrubias, Pharmacy Intern      I have reviewed and concur with the above plan     [place] Arana, PharmD

## 2019-01-15 ENCOUNTER — OFFICE VISIT (OUTPATIENT)
Dept: CARDIOLOGY | Facility: MEDICAL CENTER | Age: 84
End: 2019-01-15
Payer: MEDICARE

## 2019-01-15 VITALS
OXYGEN SATURATION: 95 % | HEART RATE: 59 BPM | BODY MASS INDEX: 32.34 KG/M2 | DIASTOLIC BLOOD PRESSURE: 80 MMHG | SYSTOLIC BLOOD PRESSURE: 140 MMHG | HEIGHT: 71 IN | WEIGHT: 231 LBS

## 2019-01-15 DIAGNOSIS — I25.9 ISCHEMIC HEART DISEASE: ICD-10-CM

## 2019-01-15 DIAGNOSIS — D68.51 FACTOR V LEIDEN (HCC): ICD-10-CM

## 2019-01-15 DIAGNOSIS — R25.1 TREMOR: ICD-10-CM

## 2019-01-15 DIAGNOSIS — E78.00 PURE HYPERCHOLESTEROLEMIA: ICD-10-CM

## 2019-01-15 DIAGNOSIS — Z79.899 HIGH RISK MEDICATION USE: ICD-10-CM

## 2019-01-15 DIAGNOSIS — H81.09 MENIERE'S DISEASE, UNSPECIFIED LATERALITY: ICD-10-CM

## 2019-01-15 PROCEDURE — 99214 OFFICE O/P EST MOD 30 MIN: CPT | Performed by: INTERNAL MEDICINE

## 2019-01-15 RX ORDER — TRIAMCINOLONE ACETONIDE 1 MG/G
CREAM TOPICAL
COMMUNITY
Start: 2018-12-12 | End: 2019-05-02

## 2019-01-15 RX ORDER — DIAZEPAM 2 MG/1
TABLET ORAL
COMMUNITY
Start: 2019-01-14 | End: 2019-05-02

## 2019-01-15 ASSESSMENT — ENCOUNTER SYMPTOMS
CHILLS: 0
BLURRED VISION: 0
SHORTNESS OF BREATH: 0
COUGH: 0
FALLS: 0
PND: 0
ABDOMINAL PAIN: 0
WEIGHT LOSS: 0
EYE DISCHARGE: 0
HEADACHES: 0
DOUBLE VISION: 0
PALPITATIONS: 0
NAUSEA: 0
SPEECH CHANGE: 0
DEPRESSION: 0
DIZZINESS: 0
ORTHOPNEA: 0
MYALGIAS: 0
BLOOD IN STOOL: 0
VOMITING: 0
BRUISES/BLEEDS EASILY: 0
SENSORY CHANGE: 0
EYE PAIN: 0
LOSS OF CONSCIOUSNESS: 0
HALLUCINATIONS: 0
CLAUDICATION: 0
FEVER: 0

## 2019-01-15 NOTE — PROGRESS NOTES
Chief Complaint   Patient presents with   • Hypertension       Subjective:   Michael Lockhart is a 83 y.o. male who presents today for cardiac care of CAD (MI in 2003), HTN, HLP, Factor V Leiden, history of PE.      At prior visit, patient reported of feeling some chest pain at random times. Pressure-like sensation. No shortness of breath. We perform myocardial PET scan stress test was negative for coronary ischemia. LVEF on that test was about 70%.     Still chronic dizziness due to Meniere's disease and visual problems. Has been seen at [facility].    Past Medical History:   Diagnosis Date   • Acute, but ill-defined, cerebrovascular disease    • CAD (coronary artery disease)    • Chest pain, unspecified     History of recurrent chest pain withput evidence of acive ischemia by thallium   • General symptoms NEC     Fatigue with low blood pressure   • Headache(784.0)     Chronic   • Herpes zoster without mention of complication    • Ischemic cardiomyopathy    • Ischemic heart disease    • Long term (current) use of anticoagulants    • Other abnormal glucose    • Primary hypercoagulable state (HCC)     History of positive Factor V Leiden   • Pulmonary embolism (HCC)     Complication of cardiac catherization   • Pure hypercholesterolemia    • Rotator cuff (capsule) sprain    • Valvular heart disease      Past Surgical History:   Procedure Laterality Date   • ANGIOPLASTY  2003     Family History   Problem Relation Age of Onset   • Heart Disease Mother    • Arthritis Mother    • Other Sister         overweight   • Other Brother         tremors   • Clotting Disorder Brother         Factor V     Social History     Social History   • Marital status:      Spouse name: N/A   • Number of children: N/A   • Years of education: N/A     Occupational History   • Not on file.     Social History Main Topics   • Smoking status: Never Smoker   • Smokeless tobacco: Never Used   • Alcohol use No   • Drug use: No   • Sexual activity:  Not on file     Other Topics Concern   • Not on file     Social History Narrative   • No narrative on file     Allergies   Allergen Reactions   • Finasteride Swelling     Hand swelling, stopped medications after taking for three days   • Zetia [Ezetimibe]      N/V     Outpatient Encounter Prescriptions as of 1/15/2019   Medication Sig Dispense Refill   • diazePAM (VALIUM) 2 MG Tab      • pravastatin (PRAVACHOL) 20 MG Tab TAKE 1 TABLET TWICE A  Tab 1   • warfarin (COUMADIN) 7.5 MG Tab TAKE 1 TABLET DAILY OR AS DIRECTED BY CLINIC 90 Tab 1   • lisinopril (PRINIVIL) 5 MG Tab Take 1 Tab by mouth as needed. 90 Tab 3   • warfarin (COUMADIN) 5 MG Tab Take 1-2 tabs po q day or as directed by clinic 90 Tab 3   • vitamin D (CHOLECALCIFEROL) 1000 UNIT TABS Take 5,000 Units by mouth every day.     • B Complex Vitamins (VITAMIN B COMPLEX PO) Take 1 Tab by mouth every day.     • triamcinolone acetonide (KENALOG) 0.1 % Cream        No facility-administered encounter medications on file as of 1/15/2019.      Review of Systems   Constitutional: Negative for chills, fever, malaise/fatigue and weight loss.   HENT: Negative for ear discharge, ear pain, hearing loss and nosebleeds.    Eyes: Negative for blurred vision, double vision, pain and discharge.   Respiratory: Negative for cough and shortness of breath.    Cardiovascular: Negative for chest pain, palpitations, orthopnea, claudication, leg swelling and PND.   Gastrointestinal: Negative for abdominal pain, blood in stool, melena, nausea and vomiting.   Genitourinary: Negative for dysuria and hematuria.   Musculoskeletal: Negative for falls, joint pain and myalgias.   Skin: Negative for itching and rash.   Neurological: Negative for dizziness, sensory change, speech change, loss of consciousness and headaches.   Endo/Heme/Allergies: Negative for environmental allergies. Does not bruise/bleed easily.   Psychiatric/Behavioral: Negative for depression, hallucinations and  "suicidal ideas.        Objective:   /80 (BP Location: Right arm, Patient Position: Sitting, BP Cuff Size: Adult)   Pulse (!) 59   Ht 1.803 m (5' 11\")   Wt 104.8 kg (231 lb)   SpO2 95%   BMI 32.22 kg/m²     Physical Exam   Constitutional: He is oriented to person, place, and time. No distress.   HENT:   Head: Normocephalic and atraumatic.   Right Ear: External ear normal.   Left Ear: External ear normal.   Eyes: Right eye exhibits no discharge. Left eye exhibits no discharge.   Neck: No JVD present. No thyromegaly present.   Cardiovascular: Normal rate, regular rhythm, normal heart sounds and intact distal pulses.  Exam reveals no gallop and no friction rub.    No murmur heard.  Pulmonary/Chest: Breath sounds normal. No respiratory distress.   Abdominal: Bowel sounds are normal. He exhibits no distension. There is no tenderness.   Musculoskeletal: He exhibits no edema or tenderness.   Neurological: He is alert and oriented to person, place, and time. No cranial nerve deficit.   Skin: Skin is warm and dry. He is not diaphoretic.   Psychiatric: He has a normal mood and affect. His behavior is normal.   Nursing note and vitals reviewed.      Assessment:     1. Factor V Leiden (HCC)  COMP METABOLIC PANEL    LIPID PANEL   2. Ischemic heart disease  COMP METABOLIC PANEL    LIPID PANEL   3. Meniere's disease, unspecified laterality  COMP METABOLIC PANEL    LIPID PANEL   4. Pure hypercholesterolemia  COMP METABOLIC PANEL    LIPID PANEL   5. Tremor  COMP METABOLIC PANEL    LIPID PANEL   6. High risk medication use  COMP METABOLIC PANEL    LIPID PANEL       Medical Decision Making:  Today's Assessment / Status / Plan:   At this time patient is clinically stable in terms of his cardiac standpoint.  Cont current medications at current dose.   Lisinopril 5 mg daily, Pravastatin 20 mg daily.  Continue coumadin for Factor V Leiden.  The less invasive we do the better.     I will see patient back in clinic with lab tests " and studies results in 6 months.     I thank you Dr. Michael for referring patient to our Cardiology Clinic today.

## 2019-01-15 NOTE — LETTER
[facility]   [address] [place] [address] [address]  [place] [address]  Phone: [phone]  Fax: [phone]              Michael Lockhart  1935    Encounter Date: 1/15/2019    Cal Luong M.D.          PROGRESS NOTE:  Chief Complaint   Patient presents with   • Hypertension       Subjective:   Michael Lockhart is a 83 y.o. male who presents today     Past Medical History:   Diagnosis Date   • Acute, but ill-defined, cerebrovascular disease    • CAD (coronary artery disease)    • Chest pain, unspecified     History of recurrent chest pain withput evidence of acive ischemia by thallium   • General symptoms NEC     Fatigue with low blood pressure   • Headache(784.0)     Chronic   • Herpes zoster without mention of complication    • Ischemic cardiomyopathy    • Ischemic heart disease    • Long term (current) use of anticoagulants    • Other abnormal glucose    • Primary hypercoagulable state (HCC)     History of positive Factor V Leiden   • Pulmonary embolism (HCC)     Complication of cardiac catherization   • Pure hypercholesterolemia    • Rotator cuff (capsule) sprain    • Valvular heart disease      Past Surgical History:   Procedure Laterality Date   • ANGIOPLASTY  2003     Family History   Problem Relation Age of Onset   • Heart Disease Mother    • Arthritis Mother    • Other Sister         overweight   • Other Brother         tremors   • Clotting Disorder Brother         Factor V     Social History     Social History   • Marital status:      Spouse name: N/A   • Number of children: N/A   • Years of education: N/A     Occupational History   • Not on file.     Social History Main Topics   • Smoking status: Never Smoker   • Smokeless tobacco: Never Used   • Alcohol use No   • Drug use: No   • Sexual activity: Not on file     Other Topics Concern   • Not on file     Social History Narrative   • No narrative on file     Allergies   Allergen Reactions   • Finasteride  "Swelling     Hand swelling, stopped medications after taking for three days   • Zetia [Ezetimibe]      N/V     Outpatient Encounter Prescriptions as of 1/15/2019   Medication Sig Dispense Refill   • diazePAM (VALIUM) 2 MG Tab      • pravastatin (PRAVACHOL) 20 MG Tab TAKE 1 TABLET TWICE A  Tab 1   • warfarin (COUMADIN) 7.5 MG Tab TAKE 1 TABLET DAILY OR AS DIRECTED BY CLINIC 90 Tab 1   • lisinopril (PRINIVIL) 5 MG Tab Take 1 Tab by mouth as needed. 90 Tab 3   • warfarin (COUMADIN) 5 MG Tab Take 1-2 tabs po q day or as directed by clinic 90 Tab 3   • vitamin D (CHOLECALCIFEROL) 1000 UNIT TABS Take 5,000 Units by mouth every day.     • B Complex Vitamins (VITAMIN B COMPLEX PO) Take 1 Tab by mouth every day.     • triamcinolone acetonide (KENALOG) 0.1 % Cream        No facility-administered encounter medications on file as of 1/15/2019.      Review of Systems   Constitutional: Negative for chills, fever, malaise/fatigue and weight loss.   HENT: Negative for ear discharge, ear pain, hearing loss and nosebleeds.    Eyes: Negative for blurred vision, double vision, pain and discharge.   Respiratory: Negative for cough and shortness of breath.    Cardiovascular: Negative for chest pain, palpitations, orthopnea, claudication, leg swelling and PND.   Gastrointestinal: Negative for abdominal pain, blood in stool, melena, nausea and vomiting.   Genitourinary: Negative for dysuria and hematuria.   Musculoskeletal: Negative for falls, joint pain and myalgias.   Skin: Negative for itching and rash.   Neurological: Negative for dizziness, sensory change, speech change, loss of consciousness and headaches.   Endo/Heme/Allergies: Negative for environmental allergies. Does not bruise/bleed easily.   Psychiatric/Behavioral: Negative for depression, hallucinations and suicidal ideas.        Objective:   /80 (BP Location: Right arm, Patient Position: Sitting, BP Cuff Size: Adult)   Pulse (!) 59   Ht 1.803 m (5' 11\")   Wt " 104.8 kg (231 lb)   SpO2 95%   BMI 32.22 kg/m²      Physical Exam   Constitutional: He is oriented to person, place, and time. No distress.   HENT:   Head: Normocephalic and atraumatic.   Right Ear: External ear normal.   Left Ear: External ear normal.   Eyes: Right eye exhibits no discharge. Left eye exhibits no discharge.   Neck: No JVD present. No thyromegaly present.   Cardiovascular: Normal rate, regular rhythm, normal heart sounds and intact distal pulses.  Exam reveals no gallop and no friction rub.    No murmur heard.  Pulmonary/Chest: Breath sounds normal. No respiratory distress.   Abdominal: Bowel sounds are normal. He exhibits no distension. There is no tenderness.   Musculoskeletal: He exhibits no edema or tenderness.   Neurological: He is alert and oriented to person, place, and time. No cranial nerve deficit.   Skin: Skin is warm and dry. He is not diaphoretic.   Psychiatric: He has a normal mood and affect. His behavior is normal.   Nursing note and vitals reviewed.      Assessment:     1. Factor V Leiden (HCC)  COMP METABOLIC PANEL    LIPID PANEL   2. Ischemic heart disease  COMP METABOLIC PANEL    LIPID PANEL   3. Meniere's disease, unspecified laterality  COMP METABOLIC PANEL    LIPID PANEL   4. Pure hypercholesterolemia  COMP METABOLIC PANEL    LIPID PANEL   5. Tremor  COMP METABOLIC PANEL    LIPID PANEL   6. High risk medication use  COMP METABOLIC PANEL    LIPID PANEL       Medical Decision Making:  Today's Assessment / Status / Plan:   At this time patient is clinically stable in terms of his cardiac standpoint.  Cont current medications at current dose.   Lisinopril 5 mg daily, Pravastatin 20 mg daily.  Continue coumadin for Factor V Leiden.  The less invasive we do the better.     I will see patient back in clinic with lab tests and studies results in 6 months.     I thank you Dr. Michael for referring patient to our Cardiology Clinic today.      Delores Michael M.D.  [address]  [address]  [address]  [place] [address]  VIA Facsimile: [phone]

## 2019-01-18 DIAGNOSIS — Z79.01 LONG TERM CURRENT USE OF ANTICOAGULANT THERAPY: ICD-10-CM

## 2019-01-22 RX ORDER — WARFARIN SODIUM 7.5 MG/1
TABLET ORAL
Qty: 90 TAB | Refills: 1 | Status: SHIPPED | OUTPATIENT
Start: 2019-01-22 | End: 2019-04-30 | Stop reason: SDUPTHER

## 2019-01-24 ENCOUNTER — ANTICOAGULATION MONITORING (OUTPATIENT)
Dept: MEDICAL GROUP | Facility: MEDICAL CENTER | Age: 84
End: 2019-01-24

## 2019-01-24 LAB — INR PPP: 2 (ref 2–3.5)

## 2019-01-24 NOTE — PROGRESS NOTES
Anticoagulation Summary  As of 1/24/2019    INR goal:   2.0-3.0   TTR:   72.2 % (3.7 y)   Today's INR:   2.0   Warfarin maintenance plan:   10 mg (5 mg x 2) on Wed, Sat; 7.5 mg (7.5 mg x 1) all other days   Weekly warfarin total:   57.5 mg   Plan last modified:   SJ LearyP.NBradley (6/16/2015)   Next INR check:   2/21/2019   Target end date:   Indefinite    Indications    Pulmonary embolism (HCC) [I26.99]  Stroke [434.91] [I63.9]             Anticoagulation Episode Summary     INR check location:       Preferred lab:       Send INR reminders to:       Comments:   Alere      Anticoagulation Care Providers     Provider Role Specialty Phone number    Adolfo Nguyen M.D. Referring Cardiology [phone]    SJ LearyPBradleyNBradley Responsible Cardiology [phone]    SJ DarlingPJASMINE Responsible Cardiology [phone]        Anticoagulation Patient Findings      Spoke with Michael to report a therapeutic INR of 2.0 Continue current dosing regimen.  Follow up in 4 weeks, to reduce the risk of adverse events related to this high risk medication, warfarin.    Evie Barron, Clinical Pharmacist

## 2019-02-26 DIAGNOSIS — Z79.01 LONG TERM CURRENT USE OF ANTICOAGULANT THERAPY: ICD-10-CM

## 2019-02-26 RX ORDER — WARFARIN SODIUM 5 MG/1
TABLET ORAL
Qty: 90 TAB | Refills: 3 | Status: SHIPPED | OUTPATIENT
Start: 2019-02-26 | End: 2020-06-08 | Stop reason: SDUPTHER

## 2019-03-04 ENCOUNTER — ANTICOAGULATION MONITORING (OUTPATIENT)
Dept: VASCULAR LAB | Facility: MEDICAL CENTER | Age: 84
End: 2019-03-04

## 2019-03-04 DIAGNOSIS — I63.119 CEREBROVASCULAR ACCIDENT (CVA) DUE TO EMBOLISM OF VERTEBRAL ARTERY, UNSPECIFIED BLOOD VESSEL LATERALITY (HCC): ICD-10-CM

## 2019-03-04 DIAGNOSIS — I26.99 OTHER PULMONARY EMBOLISM WITHOUT ACUTE COR PULMONALE, UNSPECIFIED CHRONICITY (HCC): ICD-10-CM

## 2019-03-04 LAB — INR PPP: 2.6 (ref 2–3.5)

## 2019-03-04 NOTE — PROGRESS NOTES
Anticoagulation Summary  As of 3/4/2019    INR goal:   2.0-3.0   TTR:   72.9 % (3.8 y)   INR used for dosin.6 (3/4/2019)   Warfarin maintenance plan:   10 mg (5 mg x 2) every Wed, Sat; 7.5 mg (7.5 mg x 1) all other days   Weekly warfarin total:   57.5 mg   No change documented:   Davin Motta Ass't   Plan last modified:   Susy Dominique A.P.NBradley (2015)   Next INR check:   2019   Target end date:   Indefinite    Indications    Pulmonary embolism (HCC) [I26.99]  Stroke [434.91] [I63.9]             Anticoagulation Episode Summary     INR check location:       Preferred lab:       Send INR reminders to:       Comments:   Marcial      Anticoagulation Care Providers     Provider Role Specialty Phone number    Adolfo Nguyen M.D. Referring Cardiology [phone]    Susy Dominique A.P.NBradley Responsible Cardiology [phone]    SJ DarlingPBradleyNBradley Responsible Cardiology [phone]        Anticoagulation Patient Findings  Patient Findings     Negatives:   Signs/symptoms of thrombosis, Signs/symptoms of bleeding, Laboratory test error suspected, Change in health, Change in alcohol use, Change in activity, Upcoming invasive procedure, Emergency department visit, Upcoming dental procedure, Missed doses, Extra doses, Change in medications, Change in diet/appetite, Hospital admission, Bruising, Other complaints      Spoke with patient to report a therapeutic INR.  Pt instructed to continue with current warfarin dosing regimen. Pt denies any s/s of bleeding, bruising, clotting or any changes to diet or medication.  Will follow up in 4 weeks.  Davin Motta Ass't    I have reviewed and am in agreement with the above stated plan on 3-4-19.  Lamont Will, PharmD

## 2019-04-05 ENCOUNTER — ANTICOAGULATION MONITORING (OUTPATIENT)
Dept: VASCULAR LAB | Facility: MEDICAL CENTER | Age: 84
End: 2019-04-05

## 2019-04-05 DIAGNOSIS — I63.119 CEREBROVASCULAR ACCIDENT (CVA) DUE TO EMBOLISM OF VERTEBRAL ARTERY, UNSPECIFIED BLOOD VESSEL LATERALITY (HCC): ICD-10-CM

## 2019-04-05 DIAGNOSIS — I26.99 OTHER PULMONARY EMBOLISM WITHOUT ACUTE COR PULMONALE, UNSPECIFIED CHRONICITY (HCC): ICD-10-CM

## 2019-04-05 LAB — INR PPP: 2.8 (ref 2–3.5)

## 2019-04-05 NOTE — PROGRESS NOTES
OP Anticoagulation Service Note    Date: 2019  Anticoagulation Summary  As of 2019    INR goal:   2.0-3.0   TTR:   73.5 % (3.9 y)   INR used for dosin.8 (2019)   Warfarin maintenance plan:   10 mg (5 mg x 2) every Wed, Sat; 7.5 mg (7.5 mg x 1) all other days   Weekly warfarin total:   57.5 mg   No change documented:   Davin Shetty Ass't   Plan last modified:   Susy Dominique A.P.NBradley (2015)   Next INR check:   5/3/2019   Target end date:   Indefinite    Indications    Pulmonary embolism (HCC) [I26.99]  Stroke [434.91] [I63.9]             Anticoagulation Episode Summary     INR check location:       Preferred lab:       Send INR reminders to:       Comments:   Brendare      Anticoagulation Care Providers     Provider Role Specialty Phone number    Adolfo Nguyen M.D. Referring Cardiology [phone]    SJ LearyP.NBradley Responsible Cardiology [phone]    SAMUEL Darling Responsible Cardiology [phone]        Anticoagulation Patient Findings  Patient Findings     Negatives:   Signs/symptoms of thrombosis, Signs/symptoms of bleeding, Laboratory test error suspected, Change in health, Change in alcohol use, Change in activity, Upcoming invasive procedure, Emergency department visit, Upcoming dental procedure, Missed doses, Extra doses, Change in medications, Change in diet/appetite, Hospital admission, Bruising, Other complaints        Plan: Spoke to patient. Patient is therapeutic and will remain on the same dose. Patient reports no unusual bleeding or bruising and no changes to medication or diet. Patient is to be checked again in 4 weeks.    Davin Shetty. Ass't  [facility]    I have reviewed and am in agreement with the above stated plan on 19.  Lamont Will, LawrenceD

## 2019-04-30 DIAGNOSIS — Z79.01 LONG TERM CURRENT USE OF ANTICOAGULANT THERAPY: ICD-10-CM

## 2019-04-30 RX ORDER — WARFARIN SODIUM 7.5 MG/1
TABLET ORAL
Qty: 90 TAB | Refills: 1 | Status: SHIPPED | OUTPATIENT
Start: 2019-04-30 | End: 2019-12-24

## 2019-05-01 NOTE — PROGRESS NOTES
Chief Complaint   Patient presents with   • Rapid Heart Beat       Subjective:   Michael Lockhart is a 84 y.o. male who presents today for palpitations.     He is patient of Dr. Luong in our clinic, Past medical history of coronary artery disease and MI in 2003 with cardiac arrest during cardiac catheterization had an emboli and angiogram was terminated, hypertension, hyperlipidemia, factor V Leiden and PE.     Last OV 1/2019 had no cardiovascular complaints.    Patient woke up severe chest pain and left arm pain radiating to the neck yesterday and 2 in the morning.  He did not seek any medical attention.  Pain resolved with rest 3 hours later.    Currently patient is chest pain-free.  He has increased his lisinopril to 5 mg twice daily due to elevated blood pressure at home he stated it was 120/110.    He is compliant with his warfarin.  Denies any blood in the stool or urine    Past Medical History:   Diagnosis Date   • Acute, but ill-defined, cerebrovascular disease    • CAD (coronary artery disease)    • Chest pain, unspecified     History of recurrent chest pain withput evidence of acive ischemia by thallium   • General symptoms NEC     Fatigue with low blood pressure   • Headache(784.0)     Chronic   • Herpes zoster without mention of complication    • Ischemic cardiomyopathy    • Ischemic heart disease    • Long term (current) use of anticoagulants    • Other abnormal glucose    • Primary hypercoagulable state (HCC)     History of positive Factor V Leiden   • Pulmonary embolism (HCC)     Complication of cardiac catherization   • Pure hypercholesterolemia    • Rotator cuff (capsule) sprain    • Valvular heart disease      Past Surgical History:   Procedure Laterality Date   • ANGIOPLASTY  2003     Family History   Problem Relation Age of Onset   • Heart Disease Mother    • Arthritis Mother    • Other Sister         overweight   • Other Brother         tremors   • Clotting Disorder Brother         Factor V      Social History     Social History   • Marital status:      Spouse name: N/A   • Number of children: N/A   • Years of education: N/A     Occupational History   • Not on file.     Social History Main Topics   • Smoking status: Never Smoker   • Smokeless tobacco: Never Used   • Alcohol use No   • Drug use: No   • Sexual activity: Not on file     Other Topics Concern   • Not on file     Social History Narrative   • No narrative on file     Allergies   Allergen Reactions   • Finasteride Swelling     Hand swelling, stopped medications after taking for three days   • Zetia [Ezetimibe]      N/V     Outpatient Encounter Prescriptions as of 5/2/2019   Medication Sig Dispense Refill   • nitroGLYCERIN (NITROSTAT) 0.3 MG SL tablet Place 1 Tab under tongue 1 time daily as needed for Chest Pain. 100 Tab 2   • warfarin (COUMADIN) 7.5 MG Tab Take one tablet by mouth one time daily or as directed by coumadin clinic 90 Tab 1   • warfarin (COUMADIN) 5 MG Tab Take 1-2 tabs po q day or as directed by clinic 90 Tab 3   • pravastatin (PRAVACHOL) 20 MG Tab TAKE 1 TABLET TWICE A  Tab 1   • lisinopril (PRINIVIL) 5 MG Tab Take 1 Tab by mouth as needed. (Patient taking differently: Take 5 mg by mouth 2 times a day.) 90 Tab 3   • vitamin D (CHOLECALCIFEROL) 1000 UNIT TABS Take 5,000 Units by mouth every day.     • B Complex Vitamins (VITAMIN B COMPLEX PO) Take 1 Tab by mouth every day.     • [DISCONTINUED] diazePAM (VALIUM) 2 MG Tab      • [DISCONTINUED] triamcinolone acetonide (KENALOG) 0.1 % Cream        No facility-administered encounter medications on file as of 5/2/2019.      Review of Systems   Constitutional: Negative for malaise/fatigue.   HENT: Positive for hearing loss.    Eyes: Positive for blurred vision. Negative for double vision.   Respiratory: Negative for cough, shortness of breath and wheezing.    Cardiovascular: Positive for chest pain (not today ). Negative for palpitations, orthopnea and leg swelling.  "  Gastrointestinal: Negative for blood in stool.   Genitourinary: Negative for hematuria.   Musculoskeletal: Negative for falls.   Neurological: Positive for tremors and weakness. Negative for dizziness, focal weakness, loss of consciousness and headaches.   Psychiatric/Behavioral: The patient is not nervous/anxious.    All other systems reviewed and are negative.       Objective:   /72 (BP Location: Left arm, Patient Position: Sitting, BP Cuff Size: Adult)   Pulse 72   Resp 16   Ht 1.803 m (5' 11\")   Wt 101.2 kg (223 lb)   SpO2 91%   BMI 31.10 kg/m²     Physical Exam   Constitutional: He is oriented to person, place, and time. He appears well-developed and well-nourished. No distress.   HENT:   Head: Normocephalic and atraumatic.   Eyes: Pupils are equal, round, and reactive to light.   Neck: No JVD present.   Cardiovascular: Normal rate, regular rhythm and normal heart sounds.    Pulses:       Carotid pulses are 2+ on the right side, and 2+ on the left side.  No bruit heard    Pulmonary/Chest: Effort normal and breath sounds normal.   Abdominal: Soft. Bowel sounds are normal. He exhibits no distension.   Musculoskeletal: He exhibits no edema.   Neurological: He is alert and oriented to person, place, and time. He displays tremor.   Skin: Skin is warm and dry. He is not diaphoretic.   venosis stasis changes noted on the LE   Psychiatric: He has a normal mood and affect. His behavior is normal. Judgment and thought content normal.       Echocardiography Laboratory  5/17/17  Compared to the report of the study done on07/01/2014 there has been no   significant change.  Normal left ventricular systolic function.   No evidence of valvular abnormality based on Doppler evaluation.     PET myocardial scan  2/2/2017  CONCLUSIONS AND IMPRESSIONS:  Negative or normal PET perfusion imaging for   ischemia.  No evidence of ischemia or infarction.  Normal wall motion and   resting ejection fraction.    LE Venous " Duplex  5/17/17   Technically difficult examination; however, no obvious acute thrombosis is    seen in either the deep or superficial venous system of both lower    extremities.    Cardiac stress test  12/14/2013  There is right hemidiaphragm elevation resulting in inferior wall fixed   photopenia    Cine images demonstrate no regional wall motion abnormality and the left   ventricular ejection fraction is 65%    SPECT images show no fixed or reversible defects to suggest infarction or   ischemia. The inferior wall fixed photopenia is highly likely to represent   artifact, favored over infarction    Assessment:     1. Ischemic heart disease  NM-CARDIAC PET    nitroGLYCERIN (NITROSTAT) 0.3 MG SL tablet   2. Factor V Leiden (HCC)     3. Long term current use of anticoagulant therapy     4. Rapid heart rate  EKG    NM-CARDIAC PET    nitroGLYCERIN (NITROSTAT) 0.3 MG SL tablet   5. Other chest pain  NM-CARDIAC PET    nitroGLYCERIN (NITROSTAT) 0.3 MG SL tablet   6. Essential hypertension         Medical Decision Making:  Today's Assessment / Status / Plan:     1. Chest pain with history of MI:  - EKG today showed NSR without any ischemic changes noted   -ER precautions discussed with the patient and significant other.  -Start nitroglycerin as needed for chest pain   -Stress test to rule out ischemia  -Continue pravastatin 20 mg daily and lisinopril.  Patient is not interested in starting a beta-blocker at this time.    2. Factor V leiden:  - continue warfarin    3. Hypertension:  - monitor blood pressure closely at home      Follow up in 2 months as planned, sooner as needed or PET is abnormal.    Collaborating Provider:  Dr. Leonard

## 2019-05-02 ENCOUNTER — OFFICE VISIT (OUTPATIENT)
Dept: CARDIOLOGY | Facility: MEDICAL CENTER | Age: 84
End: 2019-05-02
Payer: MEDICARE

## 2019-05-02 VITALS
SYSTOLIC BLOOD PRESSURE: 128 MMHG | BODY MASS INDEX: 31.22 KG/M2 | DIASTOLIC BLOOD PRESSURE: 72 MMHG | HEIGHT: 71 IN | RESPIRATION RATE: 16 BRPM | WEIGHT: 223 LBS | HEART RATE: 72 BPM | OXYGEN SATURATION: 91 %

## 2019-05-02 DIAGNOSIS — R00.0 RAPID HEART RATE: ICD-10-CM

## 2019-05-02 DIAGNOSIS — E78.5 HYPERLIPIDEMIA, UNSPECIFIED HYPERLIPIDEMIA TYPE: ICD-10-CM

## 2019-05-02 DIAGNOSIS — D68.51 FACTOR V LEIDEN (HCC): ICD-10-CM

## 2019-05-02 DIAGNOSIS — R00.2 PALPITATIONS: ICD-10-CM

## 2019-05-02 DIAGNOSIS — I25.9 ISCHEMIC HEART DISEASE: ICD-10-CM

## 2019-05-02 DIAGNOSIS — I10 ESSENTIAL HYPERTENSION: ICD-10-CM

## 2019-05-02 DIAGNOSIS — Z79.01 LONG TERM CURRENT USE OF ANTICOAGULANT THERAPY: ICD-10-CM

## 2019-05-02 DIAGNOSIS — R07.89 OTHER CHEST PAIN: ICD-10-CM

## 2019-05-02 LAB — EKG IMPRESSION: NORMAL

## 2019-05-02 PROCEDURE — 93000 ELECTROCARDIOGRAM COMPLETE: CPT | Performed by: INTERNAL MEDICINE

## 2019-05-02 PROCEDURE — 99214 OFFICE O/P EST MOD 30 MIN: CPT | Performed by: NURSE PRACTITIONER

## 2019-05-02 RX ORDER — PRAVASTATIN SODIUM 20 MG
20 TABLET ORAL 2 TIMES DAILY
Qty: 180 TAB | Refills: 3 | Status: SHIPPED | OUTPATIENT
Start: 2019-05-02 | End: 2019-05-02 | Stop reason: SDUPTHER

## 2019-05-02 RX ORDER — PRAVASTATIN SODIUM 40 MG
40 TABLET ORAL DAILY
Qty: 90 TAB | Refills: 3 | Status: SHIPPED | OUTPATIENT
Start: 2019-05-02 | End: 2019-11-07 | Stop reason: SDUPTHER

## 2019-05-02 RX ORDER — PRAVASTATIN SODIUM 20 MG
20 TABLET ORAL DAILY
Qty: 90 TAB | Refills: 3 | Status: CANCELLED | OUTPATIENT
Start: 2019-05-02

## 2019-05-02 RX ORDER — NITROGLYCERIN 0.3 MG/1
0.3 TABLET SUBLINGUAL
Qty: 100 TAB | Refills: 2 | Status: SHIPPED | OUTPATIENT
Start: 2019-05-02

## 2019-05-02 RX ORDER — LISINOPRIL 5 MG/1
5 TABLET ORAL
Qty: 90 TAB | Refills: 3 | Status: SHIPPED | OUTPATIENT
Start: 2019-05-02 | End: 2019-06-25 | Stop reason: SDUPTHER

## 2019-05-02 ASSESSMENT — ENCOUNTER SYMPTOMS
COUGH: 0
BLOOD IN STOOL: 0
DOUBLE VISION: 0
FALLS: 0
HEADACHES: 0
BLURRED VISION: 1
WEAKNESS: 1
TREMORS: 1
NERVOUS/ANXIOUS: 0
DIZZINESS: 0
LOSS OF CONSCIOUSNESS: 0
SHORTNESS OF BREATH: 0
FOCAL WEAKNESS: 0
WHEEZING: 0
ORTHOPNEA: 0
PALPITATIONS: 0

## 2019-05-02 NOTE — PROGRESS NOTES
"Called pt. To advise that insurance won't cover Pravastatin 20mg BID. He will have to take 40mg once daily. He is agreeable to this. He also asked why Gerardo didn't prescribe \"the medication to slow my heart down\". Explained to pt. That Gerardo thought he wasn't interested in taking a beta blocker. Pt. Said he would LIKE to start beta blocker.  To Redet to RX.  "

## 2019-05-02 NOTE — LETTER
[facility]   [address], [address]  [place] [address]  Phone: [phone]  Fax: [phone]              Michael Lockhart  1935    Encounter Date: 5/2/2019    Gerardo Johnson          PROGRESS NOTE:  Chief Complaint   Patient presents with   • Rapid Heart Beat       Subjective:   Michael Lockhart is a 84 y.o. male who presents today for palpitations.     He is patient of Dr. Luong in our clinic, Past medical history of coronary artery disease and MI in 2003 with cardiac arrest during cardiac catheterization had an emboli and angiogram was terminated, hypertension, hyperlipidemia, factor V Leiden and PE.     Last OV 1/2019 had no cardiovascular complaints.    Patient woke up severe chest pain and left arm pain radiating to the neck yesterday and 2 in the morning.  He did not seek any medical attention.  Pain resolved with rest 3 hours later.    Currently patient is chest pain-free.  He has increased his lisinopril to 5 mg twice daily due to elevated blood pressure at home he stated it was 120/110.    He is compliant with his warfarin.  Denies any blood in the stool or urine    Past Medical History:   Diagnosis Date   • Acute, but ill-defined, cerebrovascular disease    • CAD (coronary artery disease)    • Chest pain, unspecified     History of recurrent chest pain withput evidence of acive ischemia by thallium   • General symptoms NEC     Fatigue with low blood pressure   • Headache(784.0)     Chronic   • Herpes zoster without mention of complication    • Ischemic cardiomyopathy    • Ischemic heart disease    • Long term (current) use of anticoagulants    • Other abnormal glucose    • Primary hypercoagulable state (HCC)     History of positive Factor V Leiden   • Pulmonary embolism (HCC)     Complication of cardiac catherization   • Pure hypercholesterolemia    • Rotator cuff (capsule) sprain    • Valvular heart disease      Past Surgical History:   Procedure  Laterality Date   • ANGIOPLASTY  2003     Family History   Problem Relation Age of Onset   • Heart Disease Mother    • Arthritis Mother    • Other Sister         overweight   • Other Brother         tremors   • Clotting Disorder Brother         Factor V     Social History     Social History   • Marital status:      Spouse name: N/A   • Number of children: N/A   • Years of education: N/A     Occupational History   • Not on file.     Social History Main Topics   • Smoking status: Never Smoker   • Smokeless tobacco: Never Used   • Alcohol use No   • Drug use: No   • Sexual activity: Not on file     Other Topics Concern   • Not on file     Social History Narrative   • No narrative on file     Allergies   Allergen Reactions   • Finasteride Swelling     Hand swelling, stopped medications after taking for three days   • Zetia [Ezetimibe]      N/V     Outpatient Encounter Prescriptions as of 5/2/2019   Medication Sig Dispense Refill   • nitroGLYCERIN (NITROSTAT) 0.3 MG SL tablet Place 1 Tab under tongue 1 time daily as needed for Chest Pain. 100 Tab 2   • warfarin (COUMADIN) 7.5 MG Tab Take one tablet by mouth one time daily or as directed by coumadin clinic 90 Tab 1   • warfarin (COUMADIN) 5 MG Tab Take 1-2 tabs po q day or as directed by clinic 90 Tab 3   • pravastatin (PRAVACHOL) 20 MG Tab TAKE 1 TABLET TWICE A  Tab 1   • lisinopril (PRINIVIL) 5 MG Tab Take 1 Tab by mouth as needed. (Patient taking differently: Take 5 mg by mouth 2 times a day.) 90 Tab 3   • vitamin D (CHOLECALCIFEROL) 1000 UNIT TABS Take 5,000 Units by mouth every day.     • B Complex Vitamins (VITAMIN B COMPLEX PO) Take 1 Tab by mouth every day.     • [DISCONTINUED] diazePAM (VALIUM) 2 MG Tab      • [DISCONTINUED] triamcinolone acetonide (KENALOG) 0.1 % Cream        No facility-administered encounter medications on file as of 5/2/2019.      Review of Systems   Constitutional: Negative for malaise/fatigue.   HENT: Positive for hearing  "loss.    Eyes: Positive for blurred vision. Negative for double vision.   Respiratory: Negative for cough, shortness of breath and wheezing.    Cardiovascular: Positive for chest pain (not today ). Negative for palpitations, orthopnea and leg swelling.   Gastrointestinal: Negative for blood in stool.   Genitourinary: Negative for hematuria.   Musculoskeletal: Negative for falls.   Neurological: Positive for tremors and weakness. Negative for dizziness, focal weakness, loss of consciousness and headaches.   Psychiatric/Behavioral: The patient is not nervous/anxious.    All other systems reviewed and are negative.       Objective:   /72 (BP Location: Left arm, Patient Position: Sitting, BP Cuff Size: Adult)   Pulse 72   Resp 16   Ht 1.803 m (5' 11\")   Wt 101.2 kg (223 lb)   SpO2 91%   BMI 31.10 kg/m²      Physical Exam   Constitutional: He is oriented to person, place, and time. He appears well-developed and well-nourished. No distress.   HENT:   Head: Normocephalic and atraumatic.   Eyes: Pupils are equal, round, and reactive to light.   Neck: No JVD present.   Cardiovascular: Normal rate, regular rhythm and normal heart sounds.    Pulses:       Carotid pulses are 2+ on the right side, and 2+ on the left side.  No bruit heard    Pulmonary/Chest: Effort normal and breath sounds normal.   Abdominal: Soft. Bowel sounds are normal. He exhibits no distension.   Musculoskeletal: He exhibits no edema.   Neurological: He is alert and oriented to person, place, and time. He displays tremor.   Skin: Skin is warm and dry. He is not diaphoretic.   venosis stasis changes noted on the LE   Psychiatric: He has a normal mood and affect. His behavior is normal. Judgment and thought content normal.       Echocardiography Laboratory  5/17/17  Compared to the report of the study done on07/01/2014 there has been no   significant change.  Normal left ventricular systolic function.   No evidence of valvular abnormality based on " Doppler evaluation.     PET myocardial scan  2/2/2017  CONCLUSIONS AND IMPRESSIONS:  Negative or normal PET perfusion imaging for   ischemia.  No evidence of ischemia or infarction.  Normal wall motion and   resting ejection fraction.    LE Venous Duplex  5/17/17   Technically difficult examination; however, no obvious acute thrombosis is    seen in either the deep or superficial venous system of both lower    extremities.    Cardiac stress test  12/14/2013  There is right hemidiaphragm elevation resulting in inferior wall fixed   photopenia    Cine images demonstrate no regional wall motion abnormality and the left   ventricular ejection fraction is 65%    SPECT images show no fixed or reversible defects to suggest infarction or   ischemia. The inferior wall fixed photopenia is highly likely to represent   artifact, favored over infarction    Assessment:     1. Ischemic heart disease  NM-CARDIAC PET    nitroGLYCERIN (NITROSTAT) 0.3 MG SL tablet   2. Factor V Leiden (HCC)     3. Long term current use of anticoagulant therapy     4. Rapid heart rate  EKG    NM-CARDIAC PET    nitroGLYCERIN (NITROSTAT) 0.3 MG SL tablet   5. Other chest pain  NM-CARDIAC PET    nitroGLYCERIN (NITROSTAT) 0.3 MG SL tablet   6. Essential hypertension         Medical Decision Making:  Today's Assessment / Status / Plan:     1. Chest pain with history of MI:  - EKG today showed NSR without any ischemic changes noted   -ER precautions discussed with the patient and significant other.  -Start nitroglycerin as needed for chest pain   -Stress test to rule out ischemia  -Continue pravastatin 20 mg daily and lisinopril.  Patient is not interested in starting a beta-blocker at this time.    2. Factor V leiden:  - continue warfarin    3. Hypertension:  - monitor blood pressure closely at home      Follow up in 2 months as planned, sooner as needed or PET is abnormal.    Collaborating Provider:  Dr. Horacio Michael M.D.  [address] [place] [address]   [id number]  [id number]  [place] [id number]  VIA Facsimile: [phone]

## 2019-05-02 NOTE — TELEPHONE ENCOUNTER
----- Message from Gerardo Johnson sent at 5/2/2019  3:05 PM PDT -----  Regarding: RE: Dose clarification  Its BID. She takes it different, sorry my note does not reflect it     Thank you gisele    ----- Message -----  From: Gisele Zambrano, Med Ass't  Sent: 5/2/2019   2:55 PM  To: Haydee Patiño, L.P.NBradley, Gerardo Johnson  Subject: Dose clarification                               I just wanted to double check and I am sorry I caught this after the fact, med list has the pravastatin at 20mg BID dose however the most recent notes state 20mg daily.  Which does should the patient be on?

## 2019-05-02 NOTE — TELEPHONE ENCOUNTER
Tried to submit PA through CoverMyMeds but it did not process. I called the plan and now waiting for a form that we can submit by fax:  Michael Lockhart Hale: [id number] Need help? Call us at [phone]   Outcome   Additional Information Required   Drug is covered by current benefit plan. No further PA activity needed.   DrugPravastatin Sodium 20MG OR TABS   Form[employer] Scripts Electronic PA Form

## 2019-05-07 ENCOUNTER — ANTICOAGULATION MONITORING (OUTPATIENT)
Dept: MEDICAL GROUP | Facility: MEDICAL CENTER | Age: 84
End: 2019-05-07

## 2019-05-07 DIAGNOSIS — I63.119 CEREBROVASCULAR ACCIDENT (CVA) DUE TO EMBOLISM OF VERTEBRAL ARTERY, UNSPECIFIED BLOOD VESSEL LATERALITY (HCC): ICD-10-CM

## 2019-05-07 DIAGNOSIS — I26.99 OTHER PULMONARY EMBOLISM WITHOUT ACUTE COR PULMONALE, UNSPECIFIED CHRONICITY (HCC): ICD-10-CM

## 2019-05-07 LAB — INR PPP: 2.1 (ref 2–3.5)

## 2019-05-07 NOTE — PROGRESS NOTES
Anticoagulation Summary  As of 2019    INR goal:   2.0-3.0   TTR:   74.1 % (4 y)   INR used for dosin.10 (2019)   Warfarin maintenance plan:   10 mg (5 mg x 2) every Wed, Sat; 7.5 mg (7.5 mg x 1) all other days   Weekly warfarin total:   57.5 mg   No change documented:   Gisselle Pires, Med Ass't   Plan last modified:   Susy Dominique A.P.N. (2015)   Next INR check:   2019   Target end date:   Indefinite    Indications    Pulmonary embolism (HCC) [I26.99]  Stroke [434.91] [I63.9]             Anticoagulation Episode Summary     INR check location:       Preferred lab:       Send INR reminders to:       Comments:   Marcial      Anticoagulation Care Providers     Provider Role Specialty Phone number    Adolfo Nguyen M.D. Referring Cardiology [phone]    Susy Dominique A.P.N. Responsible Cardiology [phone]    SJ DarlingPBradleyNBradley Responsible Cardiology [phone]        Anticoagulation Patient Findings  Patient Findings     Negatives:   Signs/symptoms of thrombosis, Signs/symptoms of bleeding, Laboratory test error suspected, Change in health, Change in alcohol use, Change in activity, Upcoming invasive procedure, Emergency department visit, Upcoming dental procedure, Missed doses, Extra doses, Change in medications, Change in diet/appetite, Hospital admission, Bruising, Other complaints        Spoke with patient to report a therapeutic INR.  Pt instructed to continue with current warfarin dosing regimen. Pt denies any s/s of bleeding, bruising, clotting or any changes to diet or medication.  Will follow up in 4 weeks.    Gisselle Pires, Med Ass't    I have reviewed and am in agreement with the above stated plan on 19.  Lamont Will, LawrenceD

## 2019-05-07 NOTE — PROGRESS NOTES
"Called pt., spoke with wife. Pt. Has Pet MPI scheduled 5-15-19 amnd wife wants to wait for those results before he starts Metoprolol. \"He's been doing pretty well since he saw Redet.\"  FYI to Redet.      "

## 2019-05-15 ENCOUNTER — HOSPITAL ENCOUNTER (OUTPATIENT)
Dept: RADIOLOGY | Facility: MEDICAL CENTER | Age: 84
End: 2019-05-15
Attending: NURSE PRACTITIONER
Payer: MEDICARE

## 2019-05-15 DIAGNOSIS — I25.9 ISCHEMIC HEART DISEASE: ICD-10-CM

## 2019-05-15 DIAGNOSIS — R07.89 OTHER CHEST PAIN: ICD-10-CM

## 2019-05-15 DIAGNOSIS — R00.0 RAPID HEART RATE: ICD-10-CM

## 2019-05-15 PROCEDURE — 93018 CV STRESS TEST I&R ONLY: CPT | Performed by: INTERNAL MEDICINE

## 2019-05-15 PROCEDURE — 78492 MYOCRD IMG PET MLT RST&STRS: CPT | Mod: 26 | Performed by: INTERNAL MEDICINE

## 2019-05-15 NOTE — PROCEDURES
REFERRING PHYSICIAN:  Rene Luong MD and KVNG Cameron    AGE:  84.    GENDER:  Male.    HEIGHT:  71 inches.    WEIGHT:  223 pound.    INDICATIONS:  Ischemic heart disease, chest pain and rapid heartbeat.    PROCEDURE:  The patient reviewed and signed the acknowledgement for testing   form.  The patient was in a fasting state and was properly prepared for   testing.  An intravenous line was inserted and a flush of normal saline   followed to insure line patency.    A transmission scan was acquired for attenuation correction using the internal   Germanium sources.  The patient was then administered 30.1 mCi of   Rubidium-82.  Approximately 90 seconds after the infusion, resting imagine   were obtained with ECG-gating.  Following the resting series, the patient   administered 58 mg of dipyridamole over four minutes.  The blood pressure,   heart rate and ECG were monitored and recorded.  After the dipyridamole   infusion was completed, another transmission scan for attenuation correction   was obtained.  The patient was then administered 30.1 mCi of Rubidium-82.    Approximately 90 seconds after the infusion, Peak stress images were obtained   with ECG-gating.    CLINICAL RESPONSE:  Resting blood pressure was 133/79 with a heart rate of 65.    Immediately post-dipyridamole infusion the blood pressure was 113/60 with a   heart rate of 90.  After a recovery period the blood pressure was 122/60 with   a heart rate of 90.      The patient experienced flushing and headache symptoms during testing.    Aminophylline 0 mg was administered following the scan.    ELECTROCARDIOGRAPHIC FINDINGS:  At baseline, there was sinus rhythm without   EKG abnormalities.  There are no ECG changes with stress administration.    Negative stress ECG for ischemia.    SCINTOGRAPHIC FINDINGS:  The QC data for the scan was reviewed and found to be   within acceptable limits.  There is homogeneous normal tracer uptake of the   LV myocardium  both at rest and stress.  There are no fixed or reversible   defects to indicate infarction or ischemia.    GATED WALL MOTION FINDINGS:  LVEF at rest is 69%, augmenting appropriately to   80% with stress.  There are no regional wall motion abnormalities.      CONCLUSIONS AND IMPRESSIONS:    1.  Normal pharmacologic PET stress test without evidence of ischemia or   infarction.    2.  Normal left ventricular systolic function.       ____________________________________     MD JOE Clinton / DONNA    DD:  05/15/2019 13:51:05  DT:  05/15/2019 15:52:40    D#:  [id number]  Job#:  [id number]

## 2019-05-16 ENCOUNTER — TELEPHONE (OUTPATIENT)
Dept: CARDIOLOGY | Facility: MEDICAL CENTER | Age: 84
End: 2019-05-16

## 2019-05-16 NOTE — TELEPHONE ENCOUNTER
CONCLUSIONS AND IMPRESSIONS:    1.  Normal pharmacologic PET stress test without evidence of ischemia or   infarction.    2.  Normal left ventricular systolic function.

## 2019-05-17 NOTE — TELEPHONE ENCOUNTER
Called pt. Ag givbe PET MPI results. Pt. Is concerned because his pulse fluctuates between 60's and  at rest. He has FV with Dr. Luong 7-11-19 but would like appointment sooner (before mid-June) to discuss this . To  and Dr. Luong's nurse.

## 2019-06-10 ENCOUNTER — ANTICOAGULATION MONITORING (OUTPATIENT)
Dept: VASCULAR LAB | Facility: MEDICAL CENTER | Age: 84
End: 2019-06-10

## 2019-06-10 DIAGNOSIS — I26.99 OTHER PULMONARY EMBOLISM WITHOUT ACUTE COR PULMONALE, UNSPECIFIED CHRONICITY (HCC): ICD-10-CM

## 2019-06-10 DIAGNOSIS — I63.119 CEREBROVASCULAR ACCIDENT (CVA) DUE TO EMBOLISM OF VERTEBRAL ARTERY, UNSPECIFIED BLOOD VESSEL LATERALITY (HCC): ICD-10-CM

## 2019-06-10 LAB — INR PPP: 3.9 (ref 2–3.5)

## 2019-06-10 NOTE — PROGRESS NOTES
Anticoagulation Summary  As of 6/10/2019    INR goal:   2.0-3.0   TTR:   73.6 % (4.1 y)   INR used for dosing:   3.90! (6/10/2019)   Warfarin maintenance plan:   10 mg (5 mg x 2) every Wed, Sat; 7.5 mg (7.5 mg x 1) all other days   Weekly warfarin total:   57.5 mg   Plan last modified:   Susy Dominique A.P.NBradley (6/16/2015)   Next INR check:   6/24/2019   Target end date:   Indefinite    Indications    Pulmonary embolism (HCC) [I26.99]  Stroke [434.91] [I63.9]             Anticoagulation Episode Summary     INR check location:       Preferred lab:       Send INR reminders to:       Comments:   Marcial      Anticoagulation Care Providers     Provider Role Specialty Phone number    Adolfo Nguyen M.D. Referring Cardiology [phone]    SJ LearyPBradleyN. Responsible Cardiology [phone]    SJ DarlingP.N. Responsible Cardiology [phone]        Anticoagulation Patient Findings          Spoke with Michael to report a supratherapeutic INR of 3.9.  Pt to HOLD dose tonight,then resume current dosing regimen. Follow up in 2 weeks, to reduce the risk of adverse events related to this high risk medication, warfarin.    Evie Barron, Clinical Pharmacist

## 2019-06-18 ENCOUNTER — TELEPHONE (OUTPATIENT)
Dept: CARDIOLOGY | Facility: MEDICAL CENTER | Age: 84
End: 2019-06-18

## 2019-06-18 NOTE — TELEPHONE ENCOUNTER
TIFFANIE DICTATED RESULTS   Order: [id number]   Status:  Final result   Visible to patient:  No (Inaccessible in MyChart)   Notes recorded by Gerardo Johnson on 6/10/2019 at 7:41 AM PDT    Good morning Michael,  PET stress test showed Normal pharmacologic PET stress test without evidence of ischemia or   infarction.  Normal left ventricular systolic function.    Thank you,  Gerardo Johnson APRN     Letter printed with APN recommendations and mailed to patient's mailing address.

## 2019-06-18 NOTE — LETTER
June 18, 2019        Michael Lockhart  [address]  [facility] [address]          Dear Michael,    We have received the results of your recent: PET Stress Test    Your test came back and showed normal stress test without evidence or infarction.  Normal left ventricular systolic function.  Please follow up as previously discussed with your physician.      Feel free to call us with any questions.        Sincerely,          KVNG Raman R.N.  Electronically Signed

## 2019-06-25 ENCOUNTER — TELEPHONE (OUTPATIENT)
Dept: CARDIOLOGY | Facility: MEDICAL CENTER | Age: 84
End: 2019-06-25

## 2019-06-25 DIAGNOSIS — I10 ESSENTIAL HYPERTENSION: ICD-10-CM

## 2019-06-25 RX ORDER — LISINOPRIL 10 MG/1
10 TABLET ORAL
Qty: 90 TAB | Refills: 0 | Status: SHIPPED | OUTPATIENT
Start: 2019-06-25 | End: 2019-11-07 | Stop reason: SDUPTHER

## 2019-06-25 NOTE — TELEPHONE ENCOUNTER
"Pt walks-in to office today and states that his BP has not been well controlled. He states his BP has been 134/98 and 135/99. He states his head rings when it is this high. He states he doesn't know if he will be coming back here as [facility] billed him 35,000 for a recent admission to tell him nothing. I asked if patient was planning on keeping his 7/11 f/u appt, pt states he doesn't know as he may be fishing in [place]. Pt states he plans to go to [facility] for answers about his heart. Pt has been taking 10 mg Lisinopril when \"it gets that high\". Pt asks if he should take more Metoprolol too. Pt reassured I would reach out to provider who last saw him and would seek advisement on what to recommend as I cannot prescribe. Pt states understanding.   "

## 2019-06-25 NOTE — PROGRESS NOTES
Rx changed per provider recommendation.    Pt called. No answer. LVM informing of Rx and increased dose and to be aware when shipment arrives that it is a higher dose. Informed of APRN advisement to be seen within the week or at least keep 7/11 with MD as scheduled.

## 2019-07-03 ENCOUNTER — TELEPHONE (OUTPATIENT)
Dept: CARDIOLOGY | Facility: MEDICAL CENTER | Age: 84
End: 2019-07-03

## 2019-07-03 NOTE — TELEPHONE ENCOUNTER
S/w patient about fasting labs, he stated that he doesn't know if he had completed his labs. He asked for labs to faxed to his wife's fax machine:     [phone]    I told patient that I will do and to please complete them before his appt with Dr. Luong on 07/11.     Patient thanked me for the call     Fax CONF

## 2019-07-10 LAB
ALBUMIN SERPL-MCNC: 4.3 G/DL (ref 3.5–4.7)
ALBUMIN/GLOB SERPL: 1.7 {RATIO} (ref 1.2–2.2)
ALP SERPL-CCNC: 29 IU/L (ref 39–117)
ALT SERPL-CCNC: 19 IU/L (ref 0–44)
AST SERPL-CCNC: 19 IU/L (ref 0–40)
BILIRUB SERPL-MCNC: 0.5 MG/DL (ref 0–1.2)
BUN SERPL-MCNC: 22 MG/DL (ref 8–27)
BUN/CREAT SERPL: 22 (ref 10–24)
CALCIUM SERPL-MCNC: 9.4 MG/DL (ref 8.6–10.2)
CHLORIDE SERPL-SCNC: 103 MMOL/L (ref 96–106)
CHOLEST SERPL-MCNC: 136 MG/DL (ref 100–199)
CO2 SERPL-SCNC: 22 MMOL/L (ref 20–29)
CREAT SERPL-MCNC: 1 MG/DL (ref 0.76–1.27)
GLOBULIN SER CALC-MCNC: 2.5 G/DL (ref 1.5–4.5)
GLUCOSE SERPL-MCNC: 96 MG/DL (ref 65–99)
HDLC SERPL-MCNC: 40 MG/DL
LABORATORY COMMENT REPORT: ABNORMAL
LDLC SERPL CALC-MCNC: 64 MG/DL (ref 0–99)
POTASSIUM SERPL-SCNC: 4.2 MMOL/L (ref 3.5–5.2)
PROT SERPL-MCNC: 6.8 G/DL (ref 6–8.5)
SODIUM SERPL-SCNC: 140 MMOL/L (ref 134–144)
TRIGL SERPL-MCNC: 162 MG/DL (ref 0–149)
VLDLC SERPL CALC-MCNC: 32 MG/DL (ref 5–40)

## 2019-07-11 ENCOUNTER — OFFICE VISIT (OUTPATIENT)
Dept: CARDIOLOGY | Facility: MEDICAL CENTER | Age: 84
End: 2019-07-11
Payer: MEDICARE

## 2019-07-11 VITALS
HEART RATE: 97 BPM | SYSTOLIC BLOOD PRESSURE: 130 MMHG | BODY MASS INDEX: 32.06 KG/M2 | WEIGHT: 229 LBS | DIASTOLIC BLOOD PRESSURE: 80 MMHG | OXYGEN SATURATION: 90 % | HEIGHT: 71 IN

## 2019-07-11 DIAGNOSIS — D68.59 HYPERCOAGULABLE STATE (HCC): ICD-10-CM

## 2019-07-11 DIAGNOSIS — Z79.899 HIGH RISK MEDICATION USE: ICD-10-CM

## 2019-07-11 DIAGNOSIS — E78.00 PURE HYPERCHOLESTEROLEMIA: ICD-10-CM

## 2019-07-11 DIAGNOSIS — R00.2 PALPITATIONS: ICD-10-CM

## 2019-07-11 DIAGNOSIS — D68.51 FACTOR V LEIDEN (HCC): ICD-10-CM

## 2019-07-11 DIAGNOSIS — E78.2 MIXED HYPERLIPIDEMIA: ICD-10-CM

## 2019-07-11 DIAGNOSIS — I10 HTN (HYPERTENSION), MALIGNANT: ICD-10-CM

## 2019-07-11 DIAGNOSIS — R25.1 TREMOR: ICD-10-CM

## 2019-07-11 DIAGNOSIS — I25.9 ISCHEMIC HEART DISEASE: ICD-10-CM

## 2019-07-11 PROCEDURE — 99214 OFFICE O/P EST MOD 30 MIN: CPT | Performed by: INTERNAL MEDICINE

## 2019-07-11 RX ORDER — PRAMIPEXOLE DIHYDROCHLORIDE 0.25 MG/1
TABLET ORAL
Refills: 1 | COMMUNITY
Start: 2019-06-18 | End: 2022-08-04

## 2019-07-11 ASSESSMENT — ENCOUNTER SYMPTOMS
HEADACHES: 0
EYE PAIN: 0
COUGH: 0
ORTHOPNEA: 0
NAUSEA: 0
HALLUCINATIONS: 0
PALPITATIONS: 0
DOUBLE VISION: 0
EYE DISCHARGE: 0
MYALGIAS: 0
WEIGHT LOSS: 0
FALLS: 0
CLAUDICATION: 0
BRUISES/BLEEDS EASILY: 0
SPEECH CHANGE: 0
FEVER: 0
DIZZINESS: 0
PND: 0
DEPRESSION: 0
LOSS OF CONSCIOUSNESS: 0
BLURRED VISION: 0
SENSORY CHANGE: 0
CHILLS: 0
ABDOMINAL PAIN: 0
SHORTNESS OF BREATH: 0
BLOOD IN STOOL: 0
VOMITING: 0

## 2019-07-11 NOTE — LETTER
[facility]   [address]  [place] [address]  Phone: [phone]  Fax: [phone]              Michael Lockhart  1935    Encounter Date: 7/11/2019    Cal Luong M.D.          PROGRESS NOTE:  Chief Complaint   Patient presents with   • Hypertension       Subjective:   Michael Lockhart is a 84 y.o. male who presents today for cardiac care of CAD (MI in 2003), HTN, HLP, Factor V Leiden, history of PE.      At prior visit, patient reported of feeling some chest pain at random times. Pressure-like sensation. No shortness of breath. We perform myocardial PET scan stress test was negative for coronary ischemia. LVEF on that test was about 70%.     Still chronic dizziness due to Meniere's disease and visual problems. Has been seen at [facility].    I have independently interpreted and reviewed blood tests results with patient in clinic which shows normal LDL level 64, triglycerides, renal and liver function.      Past Medical History:   Diagnosis Date   • Acute, but ill-defined, cerebrovascular disease    • CAD (coronary artery disease)    • Chest pain, unspecified     History of recurrent chest pain withput evidence of acive ischemia by thallium   • General symptoms NEC     Fatigue with low blood pressure   • Headache(784.0)     Chronic   • Herpes zoster without mention of complication    • Ischemic cardiomyopathy    • Ischemic heart disease    • Long term (current) use of anticoagulants    • Other abnormal glucose    • Primary hypercoagulable state (HCC)     History of positive Factor V Leiden   • Pulmonary embolism (HCC)     Complication of cardiac catherization   • Pure hypercholesterolemia    • Rotator cuff (capsule) sprain    • Valvular heart disease      Past Surgical History:   Procedure Laterality Date   • ANGIOPLASTY  2003     Family History   Problem Relation Age of Onset   • Heart Disease Mother    • Arthritis Mother    • Other Sister     overweight   • Other Brother         tremors   • Clotting Disorder Brother         Factor V     Social History     Social History   • Marital status:      Spouse name: N/A   • Number of children: N/A   • Years of education: N/A     Occupational History   • Not on file.     Social History Main Topics   • Smoking status: Never Smoker   • Smokeless tobacco: Never Used   • Alcohol use No   • Drug use: No   • Sexual activity: Not on file     Other Topics Concern   • Not on file     Social History Narrative   • No narrative on file     Allergies   Allergen Reactions   • Finasteride Swelling     Hand swelling, stopped medications after taking for three days   • Zetia [Ezetimibe]      N/V     Outpatient Encounter Prescriptions as of 7/11/2019   Medication Sig Dispense Refill   • pramipexole (MIRAPEX) 0.25 MG Tab TAKE 1 TABLET BY MOUTH TWICE DAILY FOR 30 DAYS  1   • metoprolol (LOPRESSOR) 25 MG Tab Take 1 Tab by mouth 2 times a day. 60 Tab 3   • lisinopril (PRINIVIL) 10 MG Tab Take 1 Tab by mouth 1 time daily as needed. 90 Tab 0   • nitroGLYCERIN (NITROSTAT) 0.3 MG SL tablet Place 1 Tab under tongue 1 time daily as needed for Chest Pain. 100 Tab 2   • pravastatin (PRAVACHOL) 40 MG tablet Take 1 Tab by mouth every day. 90 Tab 3   • warfarin (COUMADIN) 7.5 MG Tab Take one tablet by mouth one time daily or as directed by coumadin clinic 90 Tab 1   • warfarin (COUMADIN) 5 MG Tab Take 1-2 tabs po q day or as directed by clinic 90 Tab 3   • vitamin D (CHOLECALCIFEROL) 1000 UNIT TABS Take 5,000 Units by mouth every day.     • B Complex Vitamins (VITAMIN B COMPLEX PO) Take 1 Tab by mouth every day.     • [DISCONTINUED] metoprolol (LOPRESSOR) 25 MG Tab Take 0.5 Tabs by mouth 2 times a day. 60 Tab 3     No facility-administered encounter medications on file as of 7/11/2019.      Review of Systems   Constitutional: Negative for chills, fever, malaise/fatigue and weight loss.   HENT: Negative for ear discharge, ear pain, hearing  "loss and nosebleeds.    Eyes: Negative for blurred vision, double vision, pain and discharge.   Respiratory: Negative for cough and shortness of breath.    Cardiovascular: Negative for chest pain, palpitations, orthopnea, claudication, leg swelling and PND.   Gastrointestinal: Negative for abdominal pain, blood in stool, melena, nausea and vomiting.   Genitourinary: Negative for dysuria and hematuria.   Musculoskeletal: Negative for falls, joint pain and myalgias.   Skin: Negative for itching and rash.   Neurological: Negative for dizziness, sensory change, speech change, loss of consciousness and headaches.   Endo/Heme/Allergies: Negative for environmental allergies. Does not bruise/bleed easily.   Psychiatric/Behavioral: Negative for depression, hallucinations and suicidal ideas.        Objective:   /80 (BP Location: Left arm, Patient Position: Sitting, BP Cuff Size: Adult)   Pulse 97   Ht 1.803 m (5' 11\")   Wt 103.9 kg (229 lb)   SpO2 90%   BMI 31.94 kg/m²      Physical Exam   Constitutional: He is oriented to person, place, and time. No distress.   HENT:   Head: Normocephalic and atraumatic.   Right Ear: External ear normal.   Left Ear: External ear normal.   Eyes: Right eye exhibits no discharge. Left eye exhibits no discharge.   Neck: No JVD present. No thyromegaly present.   Cardiovascular: Normal rate, regular rhythm, normal heart sounds and intact distal pulses.  Exam reveals no gallop and no friction rub.    No murmur heard.  Pulmonary/Chest: Breath sounds normal. No respiratory distress.   Abdominal: Bowel sounds are normal. He exhibits no distension. There is no tenderness.   Musculoskeletal: He exhibits no edema or tenderness.   Neurological: He is alert and oriented to person, place, and time. No cranial nerve deficit.   Skin: Skin is warm and dry. He is not diaphoretic.   Psychiatric: He has a normal mood and affect. His behavior is normal.   Nursing note and vitals " reviewed.      Assessment:     1. Ischemic heart disease  metoprolol (LOPRESSOR) 25 MG Tab   2. Factor V Leiden (HCC)     3. Pure hypercholesterolemia     4. Tremor     5. Hypercoagulable state (HCC)     6. HTN (hypertension), malignant  metoprolol (LOPRESSOR) 25 MG Tab   7. Mixed hyperlipidemia     8. High risk medication use     9. Palpitations  metoprolol (LOPRESSOR) 25 MG Tab       Medical Decision Making:  Today's Assessment / Status / Plan:   Palpitations:  Trial of Metoprolol 25 mg bid.    Hypertension:  Metoprolol 25 mg bid.    Hyperlipidemia:  Continue Pravastatin 40 mg po daily.    Continue blood thinner with coumadin for Factor V Leiden.    I will see patient back in clinic with lab tests and studies results in 3 months.    I thank you for referring patient to our Cardiology Clinic today.      Cal Luong MD.   [facility].  [phone]  [place], [place].            Delores Michael M.D.  [address]  [address]  [place] [address]  VIA Facsimile: [phone]

## 2019-07-11 NOTE — PROGRESS NOTES
Chief Complaint   Patient presents with   • Hypertension       Subjective:   Michael Lockhart is a 84 y.o. male who presents today for cardiac care of CAD (MI in 2003), HTN, HLP, Factor V Leiden, history of PE.      At prior visit, patient reported of feeling some chest pain at random times. Pressure-like sensation. No shortness of breath. We perform myocardial PET scan stress test was negative for coronary ischemia. LVEF on that test was about 70%.     Still chronic dizziness due to Meniere's disease and visual problems. Has been seen at [facility].    I have independently interpreted and reviewed blood tests results with patient in clinic which shows normal LDL level 64, triglycerides, renal and liver function.      Past Medical History:   Diagnosis Date   • Acute, but ill-defined, cerebrovascular disease    • CAD (coronary artery disease)    • Chest pain, unspecified     History of recurrent chest pain withput evidence of acive ischemia by thallium   • General symptoms NEC     Fatigue with low blood pressure   • Headache(784.0)     Chronic   • Herpes zoster without mention of complication    • Ischemic cardiomyopathy    • Ischemic heart disease    • Long term (current) use of anticoagulants    • Other abnormal glucose    • Primary hypercoagulable state (HCC)     History of positive Factor V Leiden   • Pulmonary embolism (HCC)     Complication of cardiac catherization   • Pure hypercholesterolemia    • Rotator cuff (capsule) sprain    • Valvular heart disease      Past Surgical History:   Procedure Laterality Date   • ANGIOPLASTY  2003     Family History   Problem Relation Age of Onset   • Heart Disease Mother    • Arthritis Mother    • Other Sister         overweight   • Other Brother         tremors   • Clotting Disorder Brother         Factor V     Social History     Social History   • Marital status:      Spouse name: N/A   • Number of children: N/A   • Years of education: N/A     Occupational History   •  Not on file.     Social History Main Topics   • Smoking status: Never Smoker   • Smokeless tobacco: Never Used   • Alcohol use No   • Drug use: No   • Sexual activity: Not on file     Other Topics Concern   • Not on file     Social History Narrative   • No narrative on file     Allergies   Allergen Reactions   • Finasteride Swelling     Hand swelling, stopped medications after taking for three days   • Zetia [Ezetimibe]      N/V     Outpatient Encounter Prescriptions as of 7/11/2019   Medication Sig Dispense Refill   • pramipexole (MIRAPEX) 0.25 MG Tab TAKE 1 TABLET BY MOUTH TWICE DAILY FOR 30 DAYS  1   • metoprolol (LOPRESSOR) 25 MG Tab Take 1 Tab by mouth 2 times a day. 60 Tab 3   • lisinopril (PRINIVIL) 10 MG Tab Take 1 Tab by mouth 1 time daily as needed. 90 Tab 0   • nitroGLYCERIN (NITROSTAT) 0.3 MG SL tablet Place 1 Tab under tongue 1 time daily as needed for Chest Pain. 100 Tab 2   • pravastatin (PRAVACHOL) 40 MG tablet Take 1 Tab by mouth every day. 90 Tab 3   • warfarin (COUMADIN) 7.5 MG Tab Take one tablet by mouth one time daily or as directed by coumadin clinic 90 Tab 1   • warfarin (COUMADIN) 5 MG Tab Take 1-2 tabs po q day or as directed by clinic 90 Tab 3   • vitamin D (CHOLECALCIFEROL) 1000 UNIT TABS Take 5,000 Units by mouth every day.     • B Complex Vitamins (VITAMIN B COMPLEX PO) Take 1 Tab by mouth every day.     • [DISCONTINUED] metoprolol (LOPRESSOR) 25 MG Tab Take 0.5 Tabs by mouth 2 times a day. 60 Tab 3     No facility-administered encounter medications on file as of 7/11/2019.      Review of Systems   Constitutional: Negative for chills, fever, malaise/fatigue and weight loss.   HENT: Negative for ear discharge, ear pain, hearing loss and nosebleeds.    Eyes: Negative for blurred vision, double vision, pain and discharge.   Respiratory: Negative for cough and shortness of breath.    Cardiovascular: Negative for chest pain, palpitations, orthopnea, claudication, leg swelling and PND.  "  Gastrointestinal: Negative for abdominal pain, blood in stool, melena, nausea and vomiting.   Genitourinary: Negative for dysuria and hematuria.   Musculoskeletal: Negative for falls, joint pain and myalgias.   Skin: Negative for itching and rash.   Neurological: Negative for dizziness, sensory change, speech change, loss of consciousness and headaches.   Endo/Heme/Allergies: Negative for environmental allergies. Does not bruise/bleed easily.   Psychiatric/Behavioral: Negative for depression, hallucinations and suicidal ideas.        Objective:   /80 (BP Location: Left arm, Patient Position: Sitting, BP Cuff Size: Adult)   Pulse 97   Ht 1.803 m (5' 11\")   Wt 103.9 kg (229 lb)   SpO2 90%   BMI 31.94 kg/m²     Physical Exam   Constitutional: He is oriented to person, place, and time. No distress.   HENT:   Head: Normocephalic and atraumatic.   Right Ear: External ear normal.   Left Ear: External ear normal.   Eyes: Right eye exhibits no discharge. Left eye exhibits no discharge.   Neck: No JVD present. No thyromegaly present.   Cardiovascular: Normal rate, regular rhythm, normal heart sounds and intact distal pulses.  Exam reveals no gallop and no friction rub.    No murmur heard.  Pulmonary/Chest: Breath sounds normal. No respiratory distress.   Abdominal: Bowel sounds are normal. He exhibits no distension. There is no tenderness.   Musculoskeletal: He exhibits no edema or tenderness.   Neurological: He is alert and oriented to person, place, and time. No cranial nerve deficit.   Skin: Skin is warm and dry. He is not diaphoretic.   Psychiatric: He has a normal mood and affect. His behavior is normal.   Nursing note and vitals reviewed.      Assessment:     1. Ischemic heart disease  metoprolol (LOPRESSOR) 25 MG Tab   2. Factor V Leiden (HCC)     3. Pure hypercholesterolemia     4. Tremor     5. Hypercoagulable state (HCC)     6. HTN (hypertension), malignant  metoprolol (LOPRESSOR) 25 MG Tab   7. Mixed " hyperlipidemia     8. High risk medication use     9. Palpitations  metoprolol (LOPRESSOR) 25 MG Tab       Medical Decision Making:  Today's Assessment / Status / Plan:   Palpitations:  Trial of Metoprolol 25 mg bid.    Hypertension:  Metoprolol 25 mg bid.    Hyperlipidemia:  Continue Pravastatin 40 mg po daily.    Continue blood thinner with coumadin for Factor V Leiden.    I will see patient back in clinic with lab tests and studies results in 3 months.    I thank you for referring patient to our Cardiology Clinic today.      Cal Luong MD.   [facility].  [phone]  [place], [place].

## 2019-07-19 ENCOUNTER — TELEPHONE (OUTPATIENT)
Dept: VASCULAR LAB | Facility: MEDICAL CENTER | Age: 84
End: 2019-07-19

## 2019-07-19 NOTE — TELEPHONE ENCOUNTER
INR   Date Value Ref Range Status   06/10/2019 3.90  Final     No results found for: POCINR    Left message for pt to have INR checked    Shanelle Siegel, LawrenceD

## 2019-08-14 ENCOUNTER — ANTICOAGULATION MONITORING (OUTPATIENT)
Dept: VASCULAR LAB | Facility: MEDICAL CENTER | Age: 84
End: 2019-08-14

## 2019-08-14 LAB — INR PPP: 3.8 (ref 2–3.5)

## 2019-08-14 NOTE — PROGRESS NOTES
[facility]    Pt states he will obtain INR at earliest convenience.    Arcenio Bradley, PharmD

## 2019-08-15 ENCOUNTER — ANTICOAGULATION MONITORING (OUTPATIENT)
Dept: VASCULAR LAB | Facility: MEDICAL CENTER | Age: 84
End: 2019-08-15

## 2019-08-15 NOTE — PROGRESS NOTES
Anticoagulation Summary  As of 8/15/2019    INR goal:   2.0-3.0   TTR:   70.5 % (4.2 y)   INR used for dosing:   3.80! (8/14/2019)   Warfarin maintenance plan:   10 mg (5 mg x 2) every Wed, Sat; 7.5 mg (7.5 mg x 1) all other days   Weekly warfarin total:   57.5 mg   Plan last modified:   Susy Dominique A.P.NBradley (6/16/2015)   Next INR check:      Target end date:   Indefinite    Indications    Pulmonary embolism (HCC) [I26.99]  Stroke [434.91] [I63.9]             Anticoagulation Episode Summary     INR check location:       Preferred lab:       Send INR reminders to:       Comments:   [facility]      Anticoagulation Care Providers     Provider Role Specialty Phone number    Adolfo Nguyen M.D. Referring Cardiology [phone]    Susy Dominique A.P.N. Responsible Cardiology [phone]    SJ DarlingPBradleyNBradley Responsible Cardiology [phone]        Anticoagulation Patient Findings    Spoke to Patients wife on the phone to report a supratherapeutic INR of 3.8.  Pt to HOLD dose tonight, then reduce weekly dose by 8.7%. Start taking 7.5 mg every day. She said she would explain to him and will have him call if there are any questions or concerns. Follow up in 2 weeks, to reduce the risk of adverse events related to this high risk medication, warfarin.    Discussed plan with Arcenio Bradley.     Nasim Rosado, Pharmacy Intern

## 2019-09-12 ENCOUNTER — ANTICOAGULATION MONITORING (OUTPATIENT)
Dept: VASCULAR LAB | Facility: MEDICAL CENTER | Age: 84
End: 2019-09-12

## 2019-09-12 LAB — INR PPP: 2.5 (ref 2–3.5)

## 2019-09-12 NOTE — PROGRESS NOTES
OP Telephone Anticoagulation Service Note    Date: 2019      Anticoagulation Summary  As of 2019    INR goal:   2.0-3.0   TTR:   69.9 % (4.3 y)   INR used for dosin.50 (2019)   Warfarin maintenance plan:   7.5 mg (7.5 mg x 1) every day   Weekly warfarin total:   52.5 mg   Plan last modified:   Nasim Rosado, Pharmacy Intern (8/15/2019)   Next INR check:   10/10/2019   Target end date:   Indefinite    Indications    Pulmonary embolism (HCC) [I26.99]  Stroke [434.91] [I63.9]             Anticoagulation Episode Summary     INR check location:       Preferred lab:       Send INR reminders to:       Comments:   [facility]      Anticoagulation Care Providers     Provider Role Specialty Phone number    Arcenio Bradley, PharmD Responsible          Anticoagulation Patient Findings        Plan: Spoke with patient on the phone. Patient is  therapeutic today. Pt is taking 10 mg Wed/Mon then 7.5 mg ROW. Patient denies any changes in medications or diet. Patient denies any signs or symptoms of bleeding or clotting. Instructed patient to call clinic if any unusual bleeding or bruising occurs. Will continue dosing as outlined. Will follow-up with patient in 4 week(s).          Maricel Matos, PharmD

## 2019-10-11 ENCOUNTER — HOSPITAL ENCOUNTER (OUTPATIENT)
Facility: MEDICAL CENTER | Age: 84
End: 2019-10-11
Payer: MEDICARE

## 2019-10-15 ENCOUNTER — ANTICOAGULATION MONITORING (OUTPATIENT)
Dept: VASCULAR LAB | Facility: MEDICAL CENTER | Age: 84
End: 2019-10-15

## 2019-10-15 LAB — INR PPP: 3.1 (ref 2–3.5)

## 2019-10-15 NOTE — PROGRESS NOTES
Anticoagulation Summary  As of 10/15/2019    INR goal:   2.0-3.0   TTR:   70.2 % (4.4 y)   INR used for dosing:   3.10! (10/15/2019)   Warfarin maintenance plan:   10 mg (5 mg x 2) every Wed, Sat; 7.5 mg (7.5 mg x 1) all other days   Weekly warfarin total:   57.5 mg   Plan last modified:   Maricel Matos PharmD (9/12/2019)   Next INR check:      Target end date:   Indefinite    Indications    Pulmonary embolism (HCC) [I26.99]  Stroke [434.91] [I63.9]             Anticoagulation Episode Summary     INR check location:       Preferred lab:       Send INR reminders to:       Comments:   [facility]      Anticoagulation Care Providers     Provider Role Specialty Phone number    Arcenio Bradley, PharmD Responsible          Anticoagulation Patient Findings      Spoke with pt.  INR is supra-therapeutic.   Pt denies any unusual s/s of bleeding, bruising, clotting or any changes to diet or medications. Denies any etoh, cranberries, supplements, or illness.   Pt verifies warfarin weekly dosing.     Repeat INR in 2 week(s).     Instructed patient to decrease today's dose to 5 mg then resume current warfarin regimen.    Discussed plan with Evie Rodas, Pharmacy Intern

## 2019-11-07 ENCOUNTER — OFFICE VISIT (OUTPATIENT)
Dept: CARDIOLOGY | Facility: MEDICAL CENTER | Age: 84
End: 2019-11-07
Payer: MEDICARE

## 2019-11-07 VITALS
DIASTOLIC BLOOD PRESSURE: 78 MMHG | BODY MASS INDEX: 32.48 KG/M2 | WEIGHT: 232 LBS | HEART RATE: 83 BPM | OXYGEN SATURATION: 93 % | HEIGHT: 71 IN | SYSTOLIC BLOOD PRESSURE: 116 MMHG

## 2019-11-07 DIAGNOSIS — I10 HTN (HYPERTENSION), MALIGNANT: ICD-10-CM

## 2019-11-07 DIAGNOSIS — I25.9 ISCHEMIC HEART DISEASE: ICD-10-CM

## 2019-11-07 DIAGNOSIS — I10 ESSENTIAL HYPERTENSION: ICD-10-CM

## 2019-11-07 DIAGNOSIS — Z79.899 HIGH RISK MEDICATION USE: ICD-10-CM

## 2019-11-07 DIAGNOSIS — E78.2 MIXED HYPERLIPIDEMIA: ICD-10-CM

## 2019-11-07 DIAGNOSIS — D68.59 HYPERCOAGULABLE STATE (HCC): ICD-10-CM

## 2019-11-07 DIAGNOSIS — E78.00 PURE HYPERCHOLESTEROLEMIA: ICD-10-CM

## 2019-11-07 DIAGNOSIS — R00.2 PALPITATIONS: ICD-10-CM

## 2019-11-07 DIAGNOSIS — E78.5 HYPERLIPIDEMIA, UNSPECIFIED HYPERLIPIDEMIA TYPE: ICD-10-CM

## 2019-11-07 DIAGNOSIS — D68.51 FACTOR V LEIDEN (HCC): ICD-10-CM

## 2019-11-07 PROCEDURE — 99214 OFFICE O/P EST MOD 30 MIN: CPT | Performed by: INTERNAL MEDICINE

## 2019-11-07 RX ORDER — LISINOPRIL 10 MG/1
10 TABLET ORAL
Qty: 90 TAB | Refills: 0 | Status: SHIPPED | OUTPATIENT
Start: 2019-11-07 | End: 2021-03-03 | Stop reason: SDUPTHER

## 2019-11-07 RX ORDER — PRAVASTATIN SODIUM 40 MG
40 TABLET ORAL DAILY
Qty: 90 TAB | Refills: 3 | Status: SHIPPED | OUTPATIENT
Start: 2019-11-07 | End: 2020-12-07

## 2019-11-07 ASSESSMENT — ENCOUNTER SYMPTOMS
ABDOMINAL PAIN: 0
PND: 0
COUGH: 0
BRUISES/BLEEDS EASILY: 0
CHILLS: 0
HEADACHES: 0
EYE PAIN: 0
ORTHOPNEA: 0
PALPITATIONS: 0
FALLS: 0
DOUBLE VISION: 0
SHORTNESS OF BREATH: 0
SPEECH CHANGE: 0
DIZZINESS: 0
LOSS OF CONSCIOUSNESS: 0
NAUSEA: 0
BLURRED VISION: 0
VOMITING: 0
BLOOD IN STOOL: 0
WEIGHT LOSS: 0
DEPRESSION: 0
EYE DISCHARGE: 0
CLAUDICATION: 0
SENSORY CHANGE: 0
HALLUCINATIONS: 0
FEVER: 0
MYALGIAS: 0

## 2019-11-07 NOTE — PROGRESS NOTES
Chief Complaint   Patient presents with   • Hypertension     follow up        Subjective:   Michael Lockhart is a 84 y.o. male who presents today for cardiac care of CAD (MI in 2003), HTN, HLP, Factor V Leiden, history of PE.      At prior visit, patient reported of feeling some chest pain at random times. Pressure-like sensation. No shortness of breath. We perform myocardial PET scan stress test was negative for coronary ischemia. LVEF on that test was about 70%.     Still chronic dizziness due to Meniere's disease and visual problems. Has been seen at [facility].     I have independently interpreted and reviewed blood tests results with patient in clinic which shows normal LDL level 64, triglycerides, renal and liver function.    Past Medical History:   Diagnosis Date   • Acute, but ill-defined, cerebrovascular disease    • CAD (coronary artery disease)    • Chest pain, unspecified     History of recurrent chest pain withput evidence of acive ischemia by thallium   • General symptoms NEC     Fatigue with low blood pressure   • Headache(784.0)     Chronic   • Herpes zoster without mention of complication    • Ischemic cardiomyopathy    • Ischemic heart disease    • Long term (current) use of anticoagulants    • Other abnormal glucose    • Primary hypercoagulable state (HCC)     History of positive Factor V Leiden   • Pulmonary embolism (HCC)     Complication of cardiac catherization   • Pure hypercholesterolemia    • Rotator cuff (capsule) sprain    • Valvular heart disease      Past Surgical History:   Procedure Laterality Date   • ANGIOPLASTY  2003     Family History   Problem Relation Age of Onset   • Heart Disease Mother    • Arthritis Mother    • Other Sister         overweight   • Other Brother         tremors   • Clotting Disorder Brother         Factor V     Social History     Socioeconomic History   • Marital status:      Spouse name: Not on file   • Number of children: Not on file   • Years of  education: Not on file   • Highest education level: Not on file   Occupational History   • Not on file   Social Needs   • Financial resource strain: Not on file   • Food insecurity:     Worry: Not on file     Inability: Not on file   • Transportation needs:     Medical: Not on file     Non-medical: Not on file   Tobacco Use   • Smoking status: Never Smoker   • Smokeless tobacco: Never Used   Substance and Sexual Activity   • Alcohol use: No   • Drug use: No   • Sexual activity: Not on file   Lifestyle   • Physical activity:     Days per week: Not on file     Minutes per session: Not on file   • Stress: Not on file   Relationships   • Social connections:     Talks on phone: Not on file     Gets together: Not on file     Attends Zoroastrian service: Not on file     Active member of club or organization: Not on file     Attends meetings of clubs or organizations: Not on file     Relationship status: Not on file   • Intimate partner violence:     Fear of current or ex partner: Not on file     Emotionally abused: Not on file     Physically abused: Not on file     Forced sexual activity: Not on file   Other Topics Concern   • Not on file   Social History Narrative   • Not on file     Allergies   Allergen Reactions   • Finasteride Swelling     Hand swelling, stopped medications after taking for three days   • Zetia [Ezetimibe]      N/V     Outpatient Encounter Medications as of 11/7/2019   Medication Sig Dispense Refill   • metoprolol (LOPRESSOR) 25 MG Tab Take 1 Tab by mouth 2 times a day. 60 Tab 3   • pravastatin (PRAVACHOL) 40 MG tablet Take 1 Tab by mouth every day. 90 Tab 3   • lisinopril (PRINIVIL) 10 MG Tab Take 1 Tab by mouth 1 time daily as needed. 90 Tab 0   • pramipexole (MIRAPEX) 0.25 MG Tab TAKE 1 TABLET BY MOUTH TWICE DAILY FOR 30 DAYS  1   • nitroGLYCERIN (NITROSTAT) 0.3 MG SL tablet Place 1 Tab under tongue 1 time daily as needed for Chest Pain. 100 Tab 2   • warfarin (COUMADIN) 7.5 MG Tab Take one tablet by  "mouth one time daily or as directed by coumadin clinic 90 Tab 1   • warfarin (COUMADIN) 5 MG Tab Take 1-2 tabs po q day or as directed by clinic 90 Tab 3   • vitamin D (CHOLECALCIFEROL) 1000 UNIT TABS Take 5,000 Units by mouth every day.     • B Complex Vitamins (VITAMIN B COMPLEX PO) Take 1 Tab by mouth every day.     • [DISCONTINUED] metoprolol (LOPRESSOR) 25 MG Tab Take 1 Tab by mouth 2 times a day. 60 Tab 3   • [DISCONTINUED] lisinopril (PRINIVIL) 10 MG Tab Take 1 Tab by mouth 1 time daily as needed. 90 Tab 0   • [DISCONTINUED] pravastatin (PRAVACHOL) 40 MG tablet Take 1 Tab by mouth every day. 90 Tab 3     No facility-administered encounter medications on file as of 11/7/2019.      Review of Systems   Constitutional: Negative for chills, fever, malaise/fatigue and weight loss.   HENT: Negative for ear discharge, ear pain, hearing loss and nosebleeds.    Eyes: Negative for blurred vision, double vision, pain and discharge.   Respiratory: Negative for cough and shortness of breath.    Cardiovascular: Negative for chest pain, palpitations, orthopnea, claudication, leg swelling and PND.   Gastrointestinal: Negative for abdominal pain, blood in stool, melena, nausea and vomiting.   Genitourinary: Negative for dysuria and hematuria.   Musculoskeletal: Negative for falls, joint pain and myalgias.   Skin: Negative for itching and rash.   Neurological: Negative for dizziness, sensory change, speech change, loss of consciousness and headaches.   Endo/Heme/Allergies: Negative for environmental allergies. Does not bruise/bleed easily.   Psychiatric/Behavioral: Negative for depression, hallucinations and suicidal ideas.        Objective:   /78 (BP Location: Left arm, Patient Position: Sitting, BP Cuff Size: Adult)   Pulse 83   Ht 1.803 m (5' 11\")   Wt 105.2 kg (232 lb)   SpO2 93%   BMI 32.36 kg/m²     Physical Exam   Constitutional: He is oriented to person, place, and time. No distress.   HENT:   Head: " Normocephalic and atraumatic.   Right Ear: External ear normal.   Left Ear: External ear normal.   Eyes: Right eye exhibits no discharge. Left eye exhibits no discharge.   Neck: No JVD present. No thyromegaly present.   Cardiovascular: Normal rate, regular rhythm, normal heart sounds and intact distal pulses. Exam reveals no gallop and no friction rub.   No murmur heard.  Pulmonary/Chest: Breath sounds normal. No respiratory distress.   Abdominal: Bowel sounds are normal. He exhibits no distension. There is no tenderness.   Musculoskeletal:         General: No tenderness or edema.   Neurological: He is alert and oriented to person, place, and time. No cranial nerve deficit.   Skin: Skin is warm and dry. He is not diaphoretic.   Psychiatric: He has a normal mood and affect. His behavior is normal.   Nursing note and vitals reviewed.      Assessment:     1. HTN (hypertension), malignant  metoprolol (LOPRESSOR) 25 MG Tab   2. Mixed hyperlipidemia     3. Pure hypercholesterolemia     4. Factor V Leiden (HCC)     5. Hypercoagulable state (HCC)     6. Ischemic heart disease  metoprolol (LOPRESSOR) 25 MG Tab   7. High risk medication use     8. Palpitations  metoprolol (LOPRESSOR) 25 MG Tab   9. Hyperlipidemia, unspecified hyperlipidemia type  pravastatin (PRAVACHOL) 40 MG tablet   10. Essential hypertension  lisinopril (PRINIVIL) 10 MG Tab       Medical Decision Making:  Today's Assessment / Status / Plan:   Palpitations:  Continue Metoprolol 25 mg bid.     Hypertension:  Metoprolol 25 mg bid. Lisinopril 10 mg daily.     Hyperlipidemia:  Continue Pravastatin 40 mg po daily.     Continue blood thinner with coumadin for Factor V Leiden.     I will see patient back in clinic with lab tests and studies results in 6 months.     I thank you for referring patient to our Cardiology Clinic today.

## 2019-11-25 ENCOUNTER — ANTICOAGULATION MONITORING (OUTPATIENT)
Dept: MEDICAL GROUP | Facility: MEDICAL CENTER | Age: 84
End: 2019-11-25

## 2019-11-25 LAB — INR PPP: 2.6 (ref 2–3.5)

## 2019-11-25 NOTE — PROGRESS NOTES
OP Telephone Anticoagulation Service Note    Date: 2019      Anticoagulation Summary  As of 2019    INR goal:   2.0-3.0   TTR:   70.4 % (4.5 y)   INR used for dosin.60 (2019)   Warfarin maintenance plan:   10 mg (5 mg x 2) every Wed, Sat; 7.5 mg (7.5 mg x 1) all other days   Weekly warfarin total:   57.5 mg   Plan last modified:   Maricel Matos PharmD (2019)   Next INR check:   2019   Target end date:   Indefinite    Indications    Pulmonary embolism (HCC) [I26.99]  Stroke [434.91] [I63.9]             Anticoagulation Episode Summary     INR check location:       Preferred lab:       [facility] IN[facility] reminders to:       [facility]   [facility]      Anticoagulation Care Providers     Provider Role Specialty Phone number    Lawrence ArevaloD Responsible          Anticoagulation Patient Findings        Plan: Spoke with patient on the phone. Patient is  therapeutic today. Confirmed dosing. No missed tablets in the last week. Patient denies any changes in medications or diet. Patient denies any signs or symptoms of bleeding or clotting. Instructed patient to call clinic if any unusual bleeding or bruising occurs. Will continue dosing as outlined. Will follow-up with patient in 4 week(s).        Maricel Matos PharmD

## 2020-01-07 ENCOUNTER — TELEPHONE (OUTPATIENT)
Dept: VASCULAR LAB | Facility: MEDICAL CENTER | Age: 85
End: 2020-01-07

## 2020-01-14 ENCOUNTER — ANTICOAGULATION MONITORING (OUTPATIENT)
Dept: VASCULAR LAB | Facility: MEDICAL CENTER | Age: 85
End: 2020-01-14

## 2020-01-14 LAB — INR PPP: 2 (ref 2–3.5)

## 2020-01-14 NOTE — PROGRESS NOTES
OP Telephone Anticoagulation Service Note    Date: 2020      Anticoagulation Summary  As of 2020    INR goal:   2.0-3.0   TTR:   71.3 % (4.6 y)   INR used for dosin.00 (2020)   Warfarin maintenance plan:   10 mg (5 mg x 2) every Wed, Sat; 7.5 mg (7.5 mg x 1) all other days   Weekly warfarin total:   57.5 mg   Plan last modified:   Maricel Matos, PharmD (2019)   Next INR check:   2020   Target end date:   Indefinite    Indications    Pulmonary embolism (HCC) [I26.99]  Stroke [434.91] [I63.9]             Anticoagulation Episode Summary     INR check location:       Preferred lab:       Send INR reminders to:       Comments:   [facility]      Anticoagulation Care Providers     Provider Role Specialty Phone number    Arcenio Bradley, PharmD Responsible          Anticoagulation Patient Findings  Patient Findings     Negatives:   Signs/symptoms of thrombosis, Signs/symptoms of bleeding, Laboratory test error suspected, Change in health, Change in alcohol use, Change in activity, Upcoming invasive procedure, Emergency department visit, Upcoming dental procedure, Missed doses, Extra doses, Change in medications, Change in diet/appetite, Hospital admission, Bruising, Other complaints            Plan: Spoke with patient on the phone. Patient is therapeutic today. Confirmed dosing. No missed tablets in the last week. Patient denies any changes in medications or diet. Patient denies any signs or symptoms of bleeding or clotting. Instructed patient to call clinic if any unusual bleeding or bruising occurs. Will continue dosing as outlined. Will follow-up with patient in 4 week(s).      Linda Mendez, Pharmacy Intern

## 2020-02-05 ENCOUNTER — TELEPHONE (OUTPATIENT)
Dept: CARDIOLOGY | Facility: MEDICAL CENTER | Age: 85
End: 2020-02-05

## 2020-02-05 ENCOUNTER — ANTICOAGULATION MONITORING (OUTPATIENT)
Dept: VASCULAR LAB | Facility: MEDICAL CENTER | Age: 85
End: 2020-02-05

## 2020-02-05 NOTE — TELEPHONE ENCOUNTER
Called [facility] to verify they are aware of situation. Noted that pharmacist LVM with pt and with Jennifer at [facility].

## 2020-02-05 NOTE — TELEPHONE ENCOUNTER
ANTHONY Nance Pt's wife Adelita called and wanted to know if her  can have a surgical clearance for a colonoscopy 01/27th w/Dr. Rodgers, as he is on coumadin. She would like a call back at: [phone].    Thank you so much,    Joe

## 2020-02-05 NOTE — TELEPHONE ENCOUNTER
Received call from Jennifer at [facility]. Pt came into their office c/o abdominal pain and informed them that he stopped his coumadin 3 days ago in anticipation of a colonoscopy. Originally [facility] was planning on colonoscopy on 2/27/20, but since pt already stopped his coumadin they are hoping to get him in next week, and are wondering if we can provide bridging for pt in the meantime. Jennifer would like a call back with bridging instruction at [phone] ext [phone]. Pt's INR from their office yesterday was 2.3.    To [facility]

## 2020-02-05 NOTE — PROGRESS NOTES
Called and left a msg for Jennifer at [facility].    Called and spoke with pt's wife. He resumed warfarin yesterday.   Advised to let us know when the procedure is.     Shanelle Siegel, PharmD

## 2020-02-06 NOTE — TELEPHONE ENCOUNTER
Called Jennifer to f/u. [facility] reached out to her yesterday and notified her that pt restarted his Coumadin yesterday, so they are now planning on postponing the colonoscopy. They have [facility] Clinic's contact information and faxed the clearance to them to address bridging if required. She doesn't believe that they need cardiac clearance, just anticoagulation instructions.

## 2020-02-18 ENCOUNTER — ANTICOAGULATION MONITORING (OUTPATIENT)
Dept: VASCULAR LAB | Facility: MEDICAL CENTER | Age: 85
End: 2020-02-18

## 2020-02-18 VITALS — BODY MASS INDEX: 29.29 KG/M2 | WEIGHT: 210 LBS

## 2020-02-18 DIAGNOSIS — D68.51 FACTOR V LEIDEN (HCC): ICD-10-CM

## 2020-02-18 LAB — INR PPP: 2.8 (ref 2–3.5)

## 2020-02-18 NOTE — PROGRESS NOTES
Anticoagulation Summary  As of 2020    INR goal:   2.0-3.0   TTR:   71.9 % (4.7 y)   INR used for dosin.80 (2020)   Warfarin maintenance plan:   10 mg (5 mg x 2) every Wed, Sat; 7.5 mg (7.5 mg x 1) all other days   Weekly warfarin total:   57.5 mg   Plan last modified:   Maricel Matos PharmD (2019)   Next INR check:   3/3/2020   Target end date:   Indefinite    Indications    Pulmonary embolism (HCC) [I26.99]  Stroke [434.91] [I63.9]  Factor V Leiden (HCC) [D68.51]             Anticoagulation Episode Summary     INR check location:       Preferred lab:       Send INR reminders to:       Comments:   [facility]      Anticoagulation Care Providers     Provider Role Specialty Phone number    Lawrence ArevaloD Responsible          Anticoagulation Patient Findings        Spoke to patient on the phone.   INR  therapeutic.  He has a procedure scheduled on .  He will hold warfarin for 5 days prior and use Lovenox at a dose of 100 mg twice daily.  Kidney function is within normal limits for his age.  We talked about the differences and heparin dosing and he preferred the 1 mg/kg twice daily as he has factor V Leiden, history of a pulmonary embolism, and history of a stroke.  Denies signs/symptoms of bleeding and/or thrombosis.   Denies changes to diet or medications.   Follow up appointment in 2 week(s).    2017 ACC Expert Consensus Decision Pathway for Periprocedural  Management of Anticoagulation in Patients With Nonvalvular Atrial Fibrillation.     stop warfarin until after procedure   start Lovenox 100mg subcutaneously  twice daily    last dose of Lovenox    procedure, restart warfarin within 24hrs  (if approved by MD)    restart Lovenox (if approved by MD)  3/3 next coagulation clinic appt.       Washington Arana, Kasey, MS, BCACP, LCC    This note was created using voice recognition software (Dragon). The accuracy of the dictation is limited by the abilities of the  software. I have reviewed the note prior to signing, however some errors in grammar and context are still possible. If you have any questions related to this note please do not hesitate to contact our office.

## 2020-02-18 NOTE — PROGRESS NOTES
Anticoagulation Summary  As of 2/18/2020    INR goal:   2.0-3.0   TTR:   71.3 % (4.6 y)   INR used for dosing:      Warfarin maintenance plan:   10 mg (5 mg x 2) every Wed, Sat; 7.5 mg (7.5 mg x 1) all other days   Weekly warfarin total:   57.5 mg   Plan last modified:   Maricel Matos PharmD (9/12/2019)   Next INR check:      Target end date:   Indefinite    Indications    Pulmonary embolism (HCC) [I26.99]  Stroke [434.91] [I63.9]             Anticoagulation Episode Summary     INR check location:       Preferred lab:       Send INR reminders to:       Comments[facility]   [facility]      Anticoagulation Care Providers     Provider Role Specialty Phone number    Arcenio Bradley, PharmD Responsible          Anticoagulation Patient Findings      Spoke to patient on the phone.   He needs to stop warfarin for 5 days and aspirin for 7 days prior to a colonoscopy on 2/27 2020.  He will need to use Lovenox injections at a dose of approximately 100 mg twice daily.  Denies signs/symptoms of bleeding and/or thrombosis.   Denies changes to diet or medications.     I asked him to please call us with his current INR as we need to have this information prior to adjusting his warfarin.  I will also fax the clearance form to [facility] at [phone].    Washington Arana, PharmD, MS, BCACP, LCC    This note was created using voice recognition software (Dragon). The accuracy of the dictation is limited by the abilities of the software. I have reviewed the note prior to signing, however some errors in grammar and context are still possible. If you have any questions related to this note please do not hesitate to contact our office.

## 2020-02-21 ENCOUNTER — ANTICOAGULATION MONITORING (OUTPATIENT)
Dept: VASCULAR LAB | Facility: MEDICAL CENTER | Age: 85
End: 2020-02-21

## 2020-02-21 DIAGNOSIS — D68.51 FACTOR V LEIDEN (HCC): ICD-10-CM

## 2020-02-22 NOTE — PROGRESS NOTES
Patient called and requested I go over his dosing again with Lovenox.  I reiterated the Lovenox dosing as noted below.    2/22 stop warfarin until after procedure  2/23 start Lovenox 100mg subcutaneously  twice daily   2/25 last dose of Lovenox   2/27 procedure, restart warfarin within 24hrs  (if approved by MD)   2/28 restart Lovenox (if approved by MD)  3/3 next coagulation clinic appt.     Washington Arana, PharmD, MS, BCACP, LCC    This note was created using voice recognition software (Dragon). The accuracy of the dictation is limited by the abilities of the software. I have reviewed the note prior to signing, however some errors in grammar and context are still possible. If you have any questions related to this note please do not hesitate to contact our office.

## 2020-02-27 DIAGNOSIS — I25.9 ISCHEMIC HEART DISEASE: ICD-10-CM

## 2020-02-27 DIAGNOSIS — I10 HTN (HYPERTENSION), MALIGNANT: ICD-10-CM

## 2020-02-27 DIAGNOSIS — R00.2 PALPITATIONS: ICD-10-CM

## 2020-03-09 LAB — INR PPP: 5.8 (ref 2–3.5)

## 2020-03-10 ENCOUNTER — ANTICOAGULATION MONITORING (OUTPATIENT)
Dept: VASCULAR LAB | Facility: MEDICAL CENTER | Age: 85
End: 2020-03-10

## 2020-03-10 DIAGNOSIS — D68.51 FACTOR V LEIDEN (HCC): ICD-10-CM

## 2020-03-10 LAB — INR PPP: 5.2 (ref 2–3.5)

## 2020-03-10 NOTE — PROGRESS NOTES
Anticoagulation Summary  As of 3/10/2020    INR goal:   2.0-3.0   TTR:   71.1 % (4.8 y)   INR used for dosin.20! (3/10/2020)   Warfarin maintenance plan:   10 mg (5 mg x 2) every Wed, Sat; 7.5 mg (7.5 mg x 1) all other days   Weekly warfarin total:   57.5 mg   Plan last modified:   Maricel Matos PharmD (2019)   Next INR check:   3/12/2020   Target end date:   Indefinite    Indications    Pulmonary embolism (HCC) [I26.99]  Stroke [434.91] [I63.9]  Factor V Leiden (HCC) [D68.51]             Anticoagulation Episode Summary     INR check location:       Preferred lab:       Send INR reminders to:       Comments:   [facility]      Anticoagulation Care Providers     Provider Role Specialty Phone number    Arcenio Bradley PharmD Responsible          Anticoagulation Patient Findings    Spoke to patient on the phone.   INR  supra-therapeutic.   He reports that he has had a few bowel movements that looked dark in color.  I advised him to seek medical attention in the emergency room department to have them evaluated.  He has a history of diverticulitis and with his supratherapeutic INR is at high risk of a GI bleed.  Patient is reluctant to go to the emergency room department but will continue to hold warfarin and seek medical attention if dark stools continue.  Follow up appointment in 1 week(s).    Hold x 2 days then continue weekly warfarin dose as noted      Washington Arana, PharmD, MS, BCACP, LCC    This note was created using voice recognition software (Dragon). The accuracy of the dictation is limited by the abilities of the software. I have reviewed the note prior to signing, however some errors in grammar and context are still possible. If you have any questions related to this note please do not hesitate to contact our office.

## 2020-03-19 ENCOUNTER — TELEPHONE (OUTPATIENT)
Dept: VASCULAR LAB | Facility: MEDICAL CENTER | Age: 85
End: 2020-03-19

## 2020-03-19 NOTE — TELEPHONE ENCOUNTER
[facility]    Left a voicemail to remind pt to obtain next INR.    Arcenio Bradley, PharmD

## 2020-03-20 ENCOUNTER — ANTICOAGULATION MONITORING (OUTPATIENT)
Dept: VASCULAR LAB | Facility: MEDICAL CENTER | Age: 85
End: 2020-03-20

## 2020-03-20 DIAGNOSIS — D68.51 FACTOR V LEIDEN (HCC): ICD-10-CM

## 2020-03-20 LAB — INR PPP: 2.8 (ref 2–3.5)

## 2020-03-21 NOTE — PROGRESS NOTES
Anticoagulation Summary  As of 3/20/2020    INR goal:   2.0-3.0   TTR:   70.7 % (4.8 y)   INR used for dosin.80 (3/20/2020)   Warfarin maintenance plan:   10 mg (5 mg x 2) every Wed, Sat; 7.5 mg (7.5 mg x 1) all other days   Weekly warfarin total:   57.5 mg   Plan last modified:   Maricel Matos PharmD (2019)   Next INR check:      Target end date:   Indefinite    Indications    Pulmonary embolism (HCC) [I26.99]  Stroke [434.91] [I63.9]  Factor V Leiden (HCC) [D68.51]             Anticoagulation Episode Summary     INR check location:       Preferred lab:       Send INR reminders to:       Comments:   [facility]      Anticoagulation Care Providers     Provider Role Specialty Phone number    Arcenio Bradley, PharmD Responsible          Anticoagulation Patient Findings      Spoke with patient to report a therapeutic INR.    Pt instructed to continue with current warfarin dosing regimen, confirms dosing.   Pt denies any s/s of bleeding, bruising, clotting or any changes to diet or medication.    Will follow up in 2 week(s).     Erika Collazo, PharmD

## 2020-04-24 ENCOUNTER — TELEPHONE (OUTPATIENT)
Dept: VASCULAR LAB | Facility: MEDICAL CENTER | Age: 85
End: 2020-04-24

## 2020-04-24 ENCOUNTER — ANTICOAGULATION MONITORING (OUTPATIENT)
Dept: VASCULAR LAB | Facility: MEDICAL CENTER | Age: 85
End: 2020-04-24

## 2020-04-24 DIAGNOSIS — D68.51 FACTOR V LEIDEN (HCC): ICD-10-CM

## 2020-04-24 LAB — INR PPP: 3.2 (ref 2–3.5)

## 2020-04-24 NOTE — TELEPHONE ENCOUNTER
Left message for pt to have INR checked  2nd call  Letter sent  Evie Barron, Clinical Pharmacist, CDE, CACP

## 2020-04-24 NOTE — PROGRESS NOTES
Anticoagulation Summary  As of 4/24/2020    INR goal:   2.0-3.0   TTR:   70.3 % (4.9 y)   INR used for dosing:   3.20! (4/24/2020)   Warfarin maintenance plan:   10 mg (5 mg x 2) every Wed, Sat; 7.5 mg (7.5 mg x 1) all other days   Weekly warfarin total:   57.5 mg   Plan last modified:   Lawrence VincentD (9/12/2019)   Next INR check:   5/8/2020   Target end date:   Indefinite    Indications    Pulmonary embolism (HCC) [I26.99]  Stroke [434.91] [I63.9]  Factor V Leiden (HCC) [D68.51]             Anticoagulation Episode Summary     INR check location:       Preferred lab:       Send INR reminders to:       Comments:   [facility]      Anticoagulation Care Providers     Provider Role Specialty Phone number    Arcenio Bradley, PharmD Responsible          Anticoagulation Patient Findings        Spoke to patient on the phone.   INR  supra-therapeutic.   Denies signs/symptoms of bleeding and/or thrombosis.   Denies changes to diet or medications.   Follow up appointment in 2 week(s).    5mg today Continue weekly warfarin dose as noted    Call was less than 5 minutes.

## 2020-04-24 NOTE — LETTER
April 24, 2020                  Michael Lockhart  [address]  [place] [address]    Dear Michael Lockhart ,    We have been unsuccessful in our attempts to contact you regarding your Anticoagulation Service appointments. Warfarin is a potent blood-thinning agent that requires monitoring to ensure that the dosage is correct for your body.  If it isn't, you could develop serious, sometimes life-threatening bleeding problems or life-threatening blood clots or stroke could result.     It is extremely important that you have your lab work drawn as soon as possible.  We are open Monday-Friday 8 am until 5 pm.  You may reach our Service at [phone].     To monitor you effectively, we need to be able to communicate with you.  This is a requirement to be followed by our Service.  If we are unable to contact you on three separate occasions, you will be discharged from the Anticoagulation Service.     If you repeatedly fail to keep your lab appointments, you are at risk of being discharged from the Service.           Sincerely,           Adolfo Carrasco, PharmD, [facility]  Pharmacy Clinical Supervisor  [facility]  Outpatient Anticoagulation Service

## 2020-05-04 ENCOUNTER — ANTICOAGULATION MONITORING (OUTPATIENT)
Dept: VASCULAR LAB | Facility: MEDICAL CENTER | Age: 85
End: 2020-05-04

## 2020-05-04 DIAGNOSIS — D68.51 FACTOR V LEIDEN (HCC): ICD-10-CM

## 2020-05-04 LAB — INR PPP: 2.9 (ref 2–3.5)

## 2020-05-04 NOTE — PROGRESS NOTES
OP Telephone Anticoagulation Service Note    Date: 2020      Anticoagulation Summary  As of 2020    INR goal:   2.0-3.0   TTR:   70.1 % (5 y)   INR used for dosin.90 (2020)   Warfarin maintenance plan:   10 mg (5 mg x 2) every Wed, Sat; 7.5 mg (7.5 mg x 1) all other days   Weekly warfarin total:   57.5 mg   Plan last modified:   Washington Arana PharmD (2020)   Next INR check:   2020   Target end date:   Indefinite    Indications    Pulmonary embolism (HCC) [I26.99]  Stroke [434.91] [I63.9]  Factor V Leiden (HCC) [D68.51]             Anticoagulation Episode Summary     INR check location:       Preferred lab:       Send INR reminders to:       Comments:   [facility]      Anticoagulation Care Providers     Provider Role Specialty Phone number    Lawrence ArevaloD Responsible          Anticoagulation Patient Findings        Plan: Spoke with patient on the phone. Patient is  therapeutic today.  Will continue dosing as outlined. Will follow-up with patient in 2 week(s).              Maricel Matos, PharmD

## 2020-05-27 ENCOUNTER — OFFICE VISIT (OUTPATIENT)
Dept: CARDIOLOGY | Facility: MEDICAL CENTER | Age: 85
End: 2020-05-27
Payer: MEDICARE

## 2020-05-27 VITALS
WEIGHT: 208 LBS | OXYGEN SATURATION: 92 % | HEART RATE: 72 BPM | BODY MASS INDEX: 29.12 KG/M2 | SYSTOLIC BLOOD PRESSURE: 120 MMHG | DIASTOLIC BLOOD PRESSURE: 70 MMHG | RESPIRATION RATE: 15 BRPM | HEIGHT: 71 IN

## 2020-05-27 DIAGNOSIS — D68.59 HYPERCOAGULABLE STATE (HCC): ICD-10-CM

## 2020-05-27 DIAGNOSIS — E78.2 MIXED HYPERLIPIDEMIA: ICD-10-CM

## 2020-05-27 DIAGNOSIS — Z79.899 HIGH RISK MEDICATION USE: ICD-10-CM

## 2020-05-27 DIAGNOSIS — I10 HTN (HYPERTENSION), MALIGNANT: ICD-10-CM

## 2020-05-27 DIAGNOSIS — I25.9 ISCHEMIC HEART DISEASE: ICD-10-CM

## 2020-05-27 DIAGNOSIS — D68.51 FACTOR V LEIDEN (HCC): ICD-10-CM

## 2020-05-27 PROCEDURE — 99214 OFFICE O/P EST MOD 30 MIN: CPT | Performed by: INTERNAL MEDICINE

## 2020-05-27 RX ORDER — PROPRANOLOL HYDROCHLORIDE 40 MG/1
40 TABLET ORAL 3 TIMES DAILY
Qty: 90 TAB | Refills: 11 | Status: SHIPPED | OUTPATIENT
Start: 2020-05-27 | End: 2021-01-19

## 2020-05-27 ASSESSMENT — ENCOUNTER SYMPTOMS
WEIGHT LOSS: 0
NAUSEA: 0
COUGH: 0
FALLS: 0
PND: 0
CHILLS: 0
DEPRESSION: 0
ABDOMINAL PAIN: 0
MYALGIAS: 0
CLAUDICATION: 0
BRUISES/BLEEDS EASILY: 0
SHORTNESS OF BREATH: 0
BLOOD IN STOOL: 0
EYE PAIN: 0
SENSORY CHANGE: 0
DOUBLE VISION: 0
BLURRED VISION: 0
DIZZINESS: 0
SPEECH CHANGE: 0
EYE DISCHARGE: 0
VOMITING: 0
LOSS OF CONSCIOUSNESS: 0
PALPITATIONS: 0
ORTHOPNEA: 0
HALLUCINATIONS: 0
HEADACHES: 0
FEVER: 0

## 2020-05-27 NOTE — PROGRESS NOTES
Chief Complaint   Patient presents with   • Hypertension       Subjective:   Michael Lockhart is a 85 y.o. male who presents today for cardiac care of CAD (MI in 2003), HTN, HLP, Factor V Leiden, history of PE.      In the past in 05/2019, patient reported of feeling some chest pain at random times. Pressure-like sensation. No shortness of breath. We perform myocardial PET scan stress test was negative for coronary ischemia. LVEF on that test was about 70%.     Still chronic dizziness due to Meniere's disease and visual problems. Had been seen at [facility].     I have independently interpreted and reviewed blood tests results with patient in clinic which shows normal LDL level 64, triglycerides, renal and liver function.    Past Medical History:   Diagnosis Date   • Acute, but ill-defined, cerebrovascular disease    • CAD (coronary artery disease)    • Chest pain, unspecified     History of recurrent chest pain withput evidence of acive ischemia by thallium   • General symptoms NEC     Fatigue with low blood pressure   • Headache(784.0)     Chronic   • Herpes zoster without mention of complication    • Ischemic cardiomyopathy    • Ischemic heart disease    • Long term (current) use of anticoagulants    • Other abnormal glucose    • Primary hypercoagulable state (HCC)     History of positive Factor V Leiden   • Pulmonary embolism (HCC)     Complication of cardiac catherization   • Pure hypercholesterolemia    • Rotator cuff (capsule) sprain    • Valvular heart disease      Past Surgical History:   Procedure Laterality Date   • ANGIOPLASTY  2003     Family History   Problem Relation Age of Onset   • Heart Disease Mother    • Arthritis Mother    • Other Sister         overweight   • Other Brother         tremors   • Clotting Disorder Brother         Factor V     Social History     Socioeconomic History   • Marital status:      Spouse name: Not on file   • Number of children: Not on file   • Years of education: Not  on file   • Highest education level: Not on file   Occupational History   • Not on file   Social Needs   • Financial resource strain: Not on file   • Food insecurity     Worry: Not on file     Inability: Not on file   • Transportation needs     Medical: Not on file     Non-medical: Not on file   Tobacco Use   • Smoking status: Never Smoker   • Smokeless tobacco: Never Used   Substance and Sexual Activity   • Alcohol use: No   • Drug use: No   • Sexual activity: Not on file   Lifestyle   • Physical activity     Days per week: Not on file     Minutes per session: Not on file   • Stress: Not on file   Relationships   • Social connections     Talks on phone: Not on file     Gets together: Not on file     Attends [employer] service: Not on file     Active member of club or organization: Not on file     Attends meetings of clubs or organizations: Not on file     Relationship status: Not on file   • Intimate partner violence     Fear of current or ex partner: Not on file     Emotionally abused: Not on file     Physically abused: Not on file     Forced sexual activity: Not on file   Other Topics Concern   • Not on file   Social History Narrative   • Not on file     Allergies   Allergen Reactions   • Finasteride Swelling     Hand swelling, stopped medications after taking for three days   • Zetia [Ezetimibe]      N/V     Outpatient Encounter Medications as of 5/27/2020   Medication Sig Dispense Refill   • metoprolol (LOPRESSOR) 25 MG Tab Take 1 Tab by mouth 2 times a day. 180 Tab 2   • enoxaparin (LOVENOX) 100 MG/ML Solution inj Inject 100 mg as instructed every 12 hours. (do not use on 2/26 or 2/27) 20 Syringe 1   • warfarin (COUMADIN) 7.5 MG Tab TAKE 1 TABLET DAILY OR AS DIRECTED BY COUMADIN CLINIC 90 Tab 4   • pravastatin (PRAVACHOL) 40 MG tablet Take 1 Tab by mouth every day. 90 Tab 3   • lisinopril (PRINIVIL) 10 MG Tab Take 1 Tab by mouth 1 time daily as needed. 90 Tab 0   • pramipexole (MIRAPEX) 0.25 MG Tab TAKE 1  "TABLET BY MOUTH TWICE DAILY FOR 30 DAYS  1   • nitroGLYCERIN (NITROSTAT) 0.3 MG SL tablet Place 1 Tab under tongue 1 time daily as needed for Chest Pain. 100 Tab 2   • warfarin (COUMADIN) 5 MG Tab Take 1-2 tabs po q day or as directed by clinic 90 Tab 3   • vitamin D (CHOLECALCIFEROL) 1000 UNIT TABS Take 5,000 Units by mouth every day.     • B Complex Vitamins (VITAMIN B COMPLEX PO) Take 1 Tab by mouth every day.       No facility-administered encounter medications on file as of 5/27/2020.      Review of Systems   Constitutional: Negative for chills, fever, malaise/fatigue and weight loss.   HENT: Negative for ear discharge, ear pain, hearing loss and nosebleeds.    Eyes: Negative for blurred vision, double vision, pain and discharge.   Respiratory: Negative for cough and shortness of breath.    Cardiovascular: Negative for chest pain, palpitations, orthopnea, claudication, leg swelling and PND.   Gastrointestinal: Negative for abdominal pain, blood in stool, melena, nausea and vomiting.   Genitourinary: Negative for dysuria and hematuria.   Musculoskeletal: Negative for falls, joint pain and myalgias.   Skin: Negative for itching and rash.   Neurological: Negative for dizziness, sensory change, speech change, loss of consciousness and headaches.   Endo/Heme/Allergies: Negative for environmental allergies. Does not bruise/bleed easily.   Psychiatric/Behavioral: Negative for depression, hallucinations and suicidal ideas.        Objective:   /70 (BP Location: Right arm, Patient Position: Sitting, BP Cuff Size: Adult)   Pulse 72   Resp 15   Ht 1.803 m (5' 11\")   Wt 94.3 kg (208 lb)   SpO2 92%   BMI 29.01 kg/m²     Physical Exam   Constitutional: He is oriented to person, place, and time. No distress.   HENT:   Head: Normocephalic and atraumatic.   Right Ear: External ear normal.   Left Ear: External ear normal.   Eyes: Right eye exhibits no discharge. Left eye exhibits no discharge.   Neck: No JVD present. " No thyromegaly present.   Cardiovascular: Normal rate, regular rhythm, normal heart sounds and intact distal pulses. Exam reveals no gallop and no friction rub.   No murmur heard.  Pulmonary/Chest: Breath sounds normal. No respiratory distress.   Abdominal: Bowel sounds are normal. He exhibits no distension. There is no abdominal tenderness.   Musculoskeletal:         General: No tenderness or edema.   Neurological: He is alert and oriented to person, place, and time. No cranial nerve deficit.   Skin: Skin is warm and dry. He is not diaphoretic.   Psychiatric: He has a normal mood and affect. His behavior is normal.   Nursing note and vitals reviewed.      Assessment:     1. Ischemic heart disease     2. HTN (hypertension), malignant     3. Mixed hyperlipidemia     4. Factor V Leiden (HCC)     5. Hypercoagulable state (HCC)     6. High risk medication use         Medical Decision Making:  Today's Assessment / Status / Plan:   Palpitations:  Will stop Metoprolol 25 mg bid.  Trial Propranalol 40 mg tid for benign tremors.     Hypertension:  Blood pressure is well controlled.  Propranalol 40 mg tid. Lisinopril 10 mg daily.     Hyperlipidemia:  Continue Pravastatin 40 mg po daily.     Continue blood thinner with coumadin for Factor V Leiden.     I will see patient back in clinic with lab tests and studies results in 6 months.     I thank you for referring patient to our Cardiology Clinic today.

## 2020-05-29 ENCOUNTER — ANTICOAGULATION MONITORING (OUTPATIENT)
Dept: VASCULAR LAB | Facility: MEDICAL CENTER | Age: 85
End: 2020-05-29

## 2020-05-29 DIAGNOSIS — D68.51 FACTOR V LEIDEN (HCC): ICD-10-CM

## 2020-05-29 DIAGNOSIS — I63.9 CEREBROVASCULAR ACCIDENT (CVA), UNSPECIFIED MECHANISM (HCC): ICD-10-CM

## 2020-05-29 LAB — INR PPP: 3.1 (ref 2–3.5)

## 2020-05-29 NOTE — PROGRESS NOTES
Anticoagulation Summary  As of 5/29/2020    INR goal:   2.0-3.0   TTR:   69.8 % (5 y)   INR used for dosing:   3.10! (5/29/2020)   Warfarin maintenance plan:   10 mg (5 mg x 2) every Wed, Sat; 7.5 mg (7.5 mg x 1) all other days   Weekly warfarin total:   57.5 mg   Plan last modified:   Lawrence AlegriaD (4/24/2020)   Next INR check:   6/12/2020   Target end date:   Indefinite    Indications    Pulmonary embolism (HCC) [I26.99]  Stroke [434.91] [I63.9]  Factor V Leiden (HCC) [D68.51]             Anticoagulation Episode Summary     INR check location:       Preferred lab:       Send INR reminders to:       Comments:   [facility]      Anticoagulation Care Providers     Provider Role Specialty Phone number    Arcenio Bradley, PharmD Responsible          Anticoagulation Patient Findings    Left voicemail message to report a supratherapeutic INR of 3.1.  Requested pt contact the clinic for any s/s of unusual bleeding, bruising, clotting or any changes to diet or medication.   Instructed patient to decrease weekly warfarin regimen as detailed above.  FU 2 weeks.  Lamont Will, PharmD, BCACP

## 2020-06-08 DIAGNOSIS — Z79.01 LONG TERM CURRENT USE OF ANTICOAGULANT THERAPY: ICD-10-CM

## 2020-06-08 RX ORDER — WARFARIN SODIUM 5 MG/1
TABLET ORAL
Qty: 90 TAB | Refills: 1 | Status: SHIPPED | OUTPATIENT
Start: 2020-06-08 | End: 2021-03-22 | Stop reason: SDUPTHER

## 2020-07-01 ENCOUNTER — ANTICOAGULATION MONITORING (OUTPATIENT)
Dept: VASCULAR LAB | Facility: MEDICAL CENTER | Age: 85
End: 2020-07-01

## 2020-07-01 DIAGNOSIS — D68.51 FACTOR V LEIDEN (HCC): ICD-10-CM

## 2020-07-01 LAB — INR PPP: 3.4 (ref 2–3.5)

## 2020-07-01 NOTE — PROGRESS NOTES
Anticoagulation Summary  As of 7/1/2020    INR goal:   2.0-3.0   TTR:   68.6 % (5.1 y)   INR used for dosing:   3.40! (7/1/2020)   Warfarin maintenance plan:   7.5 mg (7.5 mg x 1) every day   Weekly warfarin total:   52.5 mg   Plan last modified:   Shanelle Siegel, PharmD (7/1/2020)   Next INR check:   7/15/2020   Target end date:   Indefinite    Indications    Pulmonary embolism (HCC) [I26.99]  Stroke [434.91] [I63.9]  Factor V Leiden (HCC) [D68.51]             Anticoagulation Episode Summary     INR check location:       Preferred lab:       Send INR reminders to:       Comments:   [facility]      Anticoagulation Care Providers     Provider Role Specialty Phone number    Arcenio Bradley, PharmD Responsible          Anticoagulation Patient Findings      Spoke with patient.  INR is SUPRA therapeutic.   Pt denies any unusual s/s of bleeding, bruising, clotting or any changes to diet or medications. Denies any etoh, cranberries, supplements, or illness.   Pt verifies warfarin weekly dosing - pt states that he's taking warfarin 7.5mg daily except 10mg on Wed and Sat.     Will have pt reduce his warfarin dose to 7.5mg daily.     Repeat INR in 2 week(s).     Shanelle Siegel, PharmD

## 2020-08-04 ENCOUNTER — TELEPHONE (OUTPATIENT)
Dept: VASCULAR LAB | Facility: MEDICAL CENTER | Age: 85
End: 2020-08-04

## 2020-08-04 ENCOUNTER — ANTICOAGULATION MONITORING (OUTPATIENT)
Dept: VASCULAR LAB | Facility: MEDICAL CENTER | Age: 85
End: 2020-08-04

## 2020-08-04 DIAGNOSIS — I26.99 PULMONARY EMBOLISM, UNSPECIFIED CHRONICITY, UNSPECIFIED PULMONARY EMBOLISM TYPE, UNSPECIFIED WHETHER ACUTE COR PULMONALE PRESENT (HCC): ICD-10-CM

## 2020-08-04 DIAGNOSIS — I63.9 CEREBROVASCULAR ACCIDENT (CVA), UNSPECIFIED MECHANISM (HCC): ICD-10-CM

## 2020-08-04 DIAGNOSIS — D68.51 FACTOR V LEIDEN (HCC): ICD-10-CM

## 2020-08-04 LAB — INR PPP: 2.5 (ref 2–3.5)

## 2020-08-04 NOTE — PROGRESS NOTES
"Anticoagulation Summary  As of 2020    INR goal:   2.0-3.0   TTR:   68.4 % (5.2 y)   INR used for dosin.50 (2020)   Warfarin maintenance plan:   7.5 mg (7.5 mg x 1) every day   Weekly warfarin total:   52.5 mg   Plan last modified:   Shanelle Siegel, PharmD (2020)   Next INR check:   2020   Target end date:   Indefinite    Indications    Pulmonary embolism (HCC) [I26.99]  Stroke [434.91] [I63.9]  Factor V Leiden (HCC) [D68.51]             Anticoagulation Episode Summary     INR check location:       Preferred lab:       Send INR reminders to:       Comments:   [facility]      Anticoagulation Care Providers     Provider Role Specialty Phone number    Arcenio Bradley, PharmD Responsible          Anticoagulation Patient Findings  Patient Findings     Negatives:   Signs/symptoms of thrombosis, Signs/symptoms of bleeding, Laboratory test error suspected, Change in health, Change in alcohol use, Change in activity, Upcoming invasive procedure, Emergency department visit, Upcoming dental procedure, Missed doses, Extra doses, Change in medications, Change in diet/appetite, Hospital admission, Bruising, Other complaints        Spoke with patient today regarding therapeutic INR of 2.5.  Patient denies any signs/symptoms of bruising or bleeding or any changes in diet and medications.  Instructed patient to call clinic with any questions or concerns.  Patient increased his dosing back to previous regimen as he felt new regimen \"didn't feel right\".  He wishes to continue with current warfarin regimen, which is reasonable, but asked that he be prudent with his next follow up.  Follow up in 2 weeks, to reduce risk of adverse events related to this high risk medication,  Warfarin.    Lamont Will, PharmD, BCACP      "

## 2020-09-09 ENCOUNTER — ANTICOAGULATION MONITORING (OUTPATIENT)
Dept: VASCULAR LAB | Facility: MEDICAL CENTER | Age: 85
End: 2020-09-09

## 2020-09-09 DIAGNOSIS — D68.51 FACTOR V LEIDEN (HCC): ICD-10-CM

## 2020-09-09 DIAGNOSIS — I26.99 PULMONARY EMBOLISM, UNSPECIFIED CHRONICITY, UNSPECIFIED PULMONARY EMBOLISM TYPE, UNSPECIFIED WHETHER ACUTE COR PULMONALE PRESENT (HCC): ICD-10-CM

## 2020-09-09 DIAGNOSIS — I63.9 CEREBROVASCULAR ACCIDENT (CVA), UNSPECIFIED MECHANISM (HCC): ICD-10-CM

## 2020-09-09 LAB — INR PPP: 3 (ref 2–3.5)

## 2020-09-09 NOTE — PROGRESS NOTES
Anticoagulation Summary  As of 9/9/2020    INR goal:  2.0-3.0   TTR:  69.0 % (5.3 y)   INR used for dosing:  3.00 (9/9/2020)   Warfarin maintenance plan:  10 mg (5 mg x 2) every Wed, Sat; 7.5 mg (7.5 mg x 1) all other days   Weekly warfarin total:  57.5 mg   Plan last modified:  Lamont Will, PharmD (8/4/2020)   Next INR check:  9/23/2020   Target end date:  Indefinite    Indications    Pulmonary embolism (HCC) [I26.99]  Stroke [434.91] [I63.9]  Factor V Leiden (HCC) [D68.51]             Anticoagulation Episode Summary     INR check location:      Preferred lab:      Send INR reminders to:      Comments:  [facility]      Anticoagulation Care Providers     Provider Role Specialty Phone number    Arcenio Bradley, PharmD Responsible            Spoke with patient today regarding therapeutic INR of 3.0.  Patient denies any signs/symptoms of bruising or bleeding or any changes in diet and medications.  Instructed patient to call clinic with any questions or concerns.    Pt is to continue with current warfarin dosing regimen.    Follow up in 2 weeks, to reduce risk of adverse events related to this high risk medication,  Warfarin.    Chito Ying, Pharmacy Intern

## 2020-10-12 ENCOUNTER — ANTICOAGULATION MONITORING (OUTPATIENT)
Dept: VASCULAR LAB | Facility: MEDICAL CENTER | Age: 85
End: 2020-10-12

## 2020-10-12 DIAGNOSIS — D68.51 FACTOR V LEIDEN (HCC): ICD-10-CM

## 2020-10-12 DIAGNOSIS — I63.9 CEREBROVASCULAR ACCIDENT (CVA), UNSPECIFIED MECHANISM (HCC): ICD-10-CM

## 2020-10-12 DIAGNOSIS — I26.99 PULMONARY EMBOLISM, UNSPECIFIED CHRONICITY, UNSPECIFIED PULMONARY EMBOLISM TYPE, UNSPECIFIED WHETHER ACUTE COR PULMONALE PRESENT (HCC): ICD-10-CM

## 2020-10-12 LAB — INR PPP: 3.7 (ref 2–3.5)

## 2020-10-12 NOTE — PROGRESS NOTES
"OP   Telephone Anticoagulation Service Note      Anticoagulation Summary  As of 10/12/2020    INR goal:  2.0-3.0   TTR:  67.8 % (5.4 y)   INR used for dosing:  3.70 (10/12/2020)   Warfarin maintenance plan:  10 mg (5 mg x 2) every Wed, Sat; 7.5 mg (7.5 mg x 1) all other days   Weekly warfarin total:  57.5 mg   Plan last modified:  Lamont Will, PharmD (8/4/2020)   Next INR check:  10/26/2020   Target end date:  Indefinite    Indications    Pulmonary embolism (HCC) [I26.99]  Stroke [434.91] [I63.9]  Factor V Leiden (HCC) [D68.51]             Anticoagulation Episode Summary     INR check location:      Preferred lab:      Send INR reminders to:      Comments:  [facility]      Anticoagulation Care Providers     Provider Role Specialty Phone number    Arcenio Bradley, PharmD Responsible          Anticoagulation Patient Findings  Patient Findings     Positives:  Change in diet/appetite    Comments:  No greens          Spoke with the patient on the phone today, reporting a SUPRA-therapeutic INR of 3.7. Confirmed the current warfarin dosing regimen and patient compliance.  Patient reports not eating his \"usual greens\" these past weeks. Patient denies any interval changes to medications. Patient denies any signs/symptoms of bleeding or clotting.  Patient will HOLD warfarin dose TONIGHT ONLY, resume his usual green intake, along with his current regimen hereafter and will retest again in 2 weeks.     Erick Woods  PharmD    "

## 2020-11-14 LAB — INR PPP: 3.4 (ref 2–3.5)

## 2020-11-16 ENCOUNTER — ANTICOAGULATION MONITORING (OUTPATIENT)
Dept: VASCULAR LAB | Facility: MEDICAL CENTER | Age: 85
End: 2020-11-16

## 2020-11-16 DIAGNOSIS — I26.99 PULMONARY EMBOLISM, UNSPECIFIED CHRONICITY, UNSPECIFIED PULMONARY EMBOLISM TYPE, UNSPECIFIED WHETHER ACUTE COR PULMONALE PRESENT (HCC): ICD-10-CM

## 2020-11-16 DIAGNOSIS — I63.9 CEREBROVASCULAR ACCIDENT (CVA), UNSPECIFIED MECHANISM (HCC): ICD-10-CM

## 2020-11-16 DIAGNOSIS — D68.51 FACTOR V LEIDEN (HCC): ICD-10-CM

## 2020-11-16 NOTE — PROGRESS NOTES
OP   Telephone Anticoagulation Service Note      Anticoagulation Summary  As of 11/16/2020    INR goal:  2.0-3.0   TTR:  66.7 % (5.5 y)   INR used for dosing:  3.40 (11/14/2020)   Warfarin maintenance plan:  10 mg (5 mg x 2) every Sat; 7.5 mg (7.5 mg x 1) all other days   Weekly warfarin total:  55 mg   Plan last modified:  Amalia Woods (11/16/2020)   Next INR check:  11/30/2020   Target end date:  Indefinite    Indications    Pulmonary embolism (HCC) [I26.99]  Stroke [434.91] [I63.9]  Factor V Leiden (HCC) [D68.51]             Anticoagulation Episode Summary     INR check location:      Preferred lab:      Send INR reminders to:      Comments:  [facility]      Anticoagulation Care Providers     Provider Role Specialty Phone number    Arcenio Bradley, PharmD Responsible          Anticoagulation Patient Findings     Left a message on the patient's voicemail today, informing the patient of a SUPRA-therapeutic INR of 3.4.   Instructed the patient to call the clinic with any changes to diet or medications, with any signs/sx of bleeding or clotting or with any questions or concerns.     Patient instructed to begin reduced regimen of 10mg on Sat and 7.5mg ROW. Patient asked to retest again in 2 weeks.     Erick Woods  PharmD

## 2020-12-06 DIAGNOSIS — E78.5 HYPERLIPIDEMIA, UNSPECIFIED HYPERLIPIDEMIA TYPE: ICD-10-CM

## 2020-12-07 RX ORDER — PRAVASTATIN SODIUM 40 MG
TABLET ORAL
Qty: 90 TAB | Refills: 3 | Status: SHIPPED | OUTPATIENT
Start: 2020-12-07 | End: 2021-03-03 | Stop reason: SDUPTHER

## 2020-12-21 ENCOUNTER — ANTICOAGULATION MONITORING (OUTPATIENT)
Dept: VASCULAR LAB | Facility: MEDICAL CENTER | Age: 85
End: 2020-12-21

## 2020-12-21 DIAGNOSIS — D68.51 FACTOR V LEIDEN (HCC): ICD-10-CM

## 2020-12-21 LAB — INR PPP: 2.8 (ref 2–3.5)

## 2020-12-21 NOTE — PROGRESS NOTES
Anticoagulation Summary  As of 2020    INR goal:  2.0-3.0   TTR:  66.1 % (5.6 y)   INR used for dosin.80 (2020)   Warfarin maintenance plan:  10 mg (5 mg x 2) every Sat; 7.5 mg (7.5 mg x 1) all other days   Weekly warfarin total:  55 mg   Plan last modified:  Amalia Woods (2020)   Next INR check:  2021   Target end date:  Indefinite    Indications    Pulmonary embolism (HCC) [I26.99]  Stroke [434.91] [I63.9]  Factor V Leiden (HCC) [D68.51]             Anticoagulation Episode Summary     INR check location:      Preferred lab:      Send INR reminders to:      Comments:  [facility]      Anticoagulation Care Providers     Provider Role Specialty Phone number    Arcenio Bradley, PharmD Responsible          Anticoagulation Patient Findings  Patient Findings     Negatives:  Signs/symptoms of thrombosis, Signs/symptoms of bleeding, Laboratory test error suspected, Change in health, Change in alcohol use, Change in activity, Upcoming invasive procedure, Emergency department visit, Upcoming dental procedure, Missed doses, Extra doses, Change in medications, Change in diet/appetite, Hospital admission, Bruising, Other complaints        Spoke with patient today regarding therapeutic INR of 2.8.  Patient denies any signs/symptoms of bruising or bleeding or any changes in diet and medications.  Instructed patient to call clinic with any questions or concerns.  Pt is to continue with current warfarin dosing regimen.  Follow up in 2 weeks, to reduce risk of adverse events related to this high risk medication,  Warfarin.    Lamont Will, PharmD, BCACP

## 2021-01-18 ENCOUNTER — TELEPHONE (OUTPATIENT)
Dept: CARDIOLOGY | Facility: MEDICAL CENTER | Age: 86
End: 2021-01-18

## 2021-01-18 NOTE — TELEPHONE ENCOUNTER
Chart Prep    Spoke to pt regarding standing lab and pt has not got them done yet because he has no papers? But he said he will get it dome tomorrow after his appt.

## 2021-01-19 ENCOUNTER — HOSPITAL ENCOUNTER (OUTPATIENT)
Dept: CARDIOLOGY | Facility: MEDICAL CENTER | Age: 86
End: 2021-01-19
Attending: INTERNAL MEDICINE
Payer: MEDICARE

## 2021-01-19 ENCOUNTER — OFFICE VISIT (OUTPATIENT)
Dept: CARDIOLOGY | Facility: MEDICAL CENTER | Age: 86
End: 2021-01-19
Payer: MEDICARE

## 2021-01-19 ENCOUNTER — TELEPHONE (OUTPATIENT)
Dept: CARDIOLOGY | Facility: MEDICAL CENTER | Age: 86
End: 2021-01-19

## 2021-01-19 VITALS
WEIGHT: 233 LBS | HEART RATE: 85 BPM | DIASTOLIC BLOOD PRESSURE: 84 MMHG | HEIGHT: 71 IN | BODY MASS INDEX: 32.62 KG/M2 | SYSTOLIC BLOOD PRESSURE: 138 MMHG | OXYGEN SATURATION: 95 %

## 2021-01-19 DIAGNOSIS — I10 HTN (HYPERTENSION), MALIGNANT: ICD-10-CM

## 2021-01-19 DIAGNOSIS — I25.9 ISCHEMIC HEART DISEASE: ICD-10-CM

## 2021-01-19 DIAGNOSIS — D68.51 FACTOR V LEIDEN (HCC): ICD-10-CM

## 2021-01-19 DIAGNOSIS — E78.2 MIXED HYPERLIPIDEMIA: ICD-10-CM

## 2021-01-19 DIAGNOSIS — D68.59 HYPERCOAGULABLE STATE (HCC): ICD-10-CM

## 2021-01-19 DIAGNOSIS — Z79.899 HIGH RISK MEDICATION USE: ICD-10-CM

## 2021-01-19 LAB
LV EJECT FRACT  99904: 65
LV EJECT FRACT MOD 2C 99903: 69.02
LV EJECT FRACT MOD 4C 99902: 66.44
LV EJECT FRACT MOD BP 99901: 68.41

## 2021-01-19 PROCEDURE — 93306 TTE W/DOPPLER COMPLETE: CPT | Mod: 26 | Performed by: INTERNAL MEDICINE

## 2021-01-19 PROCEDURE — 99214 OFFICE O/P EST MOD 30 MIN: CPT | Performed by: INTERNAL MEDICINE

## 2021-01-19 PROCEDURE — 93306 TTE W/DOPPLER COMPLETE: CPT

## 2021-01-19 RX ORDER — SPIRONOLACTONE 25 MG/1
25 TABLET ORAL DAILY
Qty: 30 TAB | Refills: 3 | Status: SHIPPED | OUTPATIENT
Start: 2021-01-19 | End: 2021-02-26 | Stop reason: SDUPTHER

## 2021-01-19 ASSESSMENT — ENCOUNTER SYMPTOMS
HEADACHES: 0
DEPRESSION: 0
WEIGHT LOSS: 0
PND: 0
SENSORY CHANGE: 0
ABDOMINAL PAIN: 0
FEVER: 0
BLOOD IN STOOL: 0
PALPITATIONS: 0
LOSS OF CONSCIOUSNESS: 0
ORTHOPNEA: 0
VOMITING: 0
SHORTNESS OF BREATH: 0
HALLUCINATIONS: 0
DIZZINESS: 0
FALLS: 0
SPEECH CHANGE: 0
BLURRED VISION: 0
EYE PAIN: 0
MYALGIAS: 0
CHILLS: 0
BRUISES/BLEEDS EASILY: 0
EYE DISCHARGE: 0
CLAUDICATION: 0
DOUBLE VISION: 0
COUGH: 0
NAUSEA: 0

## 2021-01-19 NOTE — TELEPHONE ENCOUNTER
KINSEY Thomas R.N.             Dear Nory,     Can you please let Michael Lockhart know that result is ok and I will see patient as scheduled?     Thank you,   Rene.      Called and informed pt.

## 2021-01-19 NOTE — RESULT ENCOUNTER NOTE
Dear Nory,    Can you please let Michael Lockhart know that result is ok and I will see patient as scheduled?    Thank you,  Rene.

## 2021-01-20 ENCOUNTER — ANTICOAGULATION MONITORING (OUTPATIENT)
Dept: VASCULAR LAB | Facility: MEDICAL CENTER | Age: 86
End: 2021-01-20

## 2021-01-20 ENCOUNTER — TELEPHONE (OUTPATIENT)
Dept: VASCULAR LAB | Facility: MEDICAL CENTER | Age: 86
End: 2021-01-20

## 2021-01-20 DIAGNOSIS — D68.51 FACTOR V LEIDEN (HCC): ICD-10-CM

## 2021-01-20 LAB
ALBUMIN SERPL-MCNC: 4.3 G/DL (ref 3.6–4.6)
ALBUMIN/GLOB SERPL: 2 {RATIO} (ref 1.2–2.2)
ALP SERPL-CCNC: 25 IU/L (ref 39–117)
ALT SERPL-CCNC: 16 IU/L (ref 0–44)
AST SERPL-CCNC: 15 IU/L (ref 0–40)
BILIRUB SERPL-MCNC: 0.7 MG/DL (ref 0–1.2)
BUN SERPL-MCNC: 18 MG/DL (ref 8–27)
BUN/CREAT SERPL: 17 (ref 10–24)
CALCIUM SERPL-MCNC: 9.5 MG/DL (ref 8.6–10.2)
CHLORIDE SERPL-SCNC: 103 MMOL/L (ref 96–106)
CHOLEST SERPL-MCNC: 152 MG/DL (ref 100–199)
CO2 SERPL-SCNC: 23 MMOL/L (ref 20–29)
CREAT SERPL-MCNC: 1.05 MG/DL (ref 0.76–1.27)
GLOBULIN SER CALC-MCNC: 2.2 G/DL (ref 1.5–4.5)
GLUCOSE SERPL-MCNC: 96 MG/DL (ref 65–99)
HDLC SERPL-MCNC: 39 MG/DL
INR PPP: 3.3 (ref 2–3.5)
LABORATORY COMMENT REPORT: ABNORMAL
LDLC SERPL CALC-MCNC: 89 MG/DL (ref 0–99)
POTASSIUM SERPL-SCNC: 4.2 MMOL/L (ref 3.5–5.2)
PROT SERPL-MCNC: 6.5 G/DL (ref 6–8.5)
SODIUM SERPL-SCNC: 139 MMOL/L (ref 134–144)
TRIGL SERPL-MCNC: 137 MG/DL (ref 0–149)
VLDLC SERPL CALC-MCNC: 24 MG/DL (ref 5–40)

## 2021-01-21 NOTE — PROGRESS NOTES
Anticoagulation Summary  As of 1/20/2021    INR goal:  2.0-3.0   TTR:  65.7 % (5.7 y)   INR used for dosing:  3.30 (1/20/2021)   Warfarin maintenance plan:  10 mg (5 mg x 2) every Wed, Sat; 7.5 mg (7.5 mg x 1) all other days   Weekly warfarin total:  57.5 mg   Plan last modified:  Erika Collazo PharmD (1/20/2021)   Next INR check:  2/3/2021   Target end date:  Indefinite    Indications    Pulmonary embolism (HCC) [I26.99]  Stroke [434.91] [I63.9]  Factor V Leiden (HCC) [D68.51]             Anticoagulation Episode Summary     INR check location:      Preferred lab:      Send INR reminders to:      Comments:  [facility]      Anticoagulation Care Providers     Provider Role Specialty Phone number    Arcenio Bradley, PharmD Responsible          Anticoagulation Patient Findings      Spoke with pt.  INR is supratherapeutic.   Pt denies any unusual s/s of bleeding, bruising, clotting or any changes to diet or medications. Denies any etoh, cranberries, supplements, or illness.   Pt verifies warfarin weekly dosing. Pt has been taking 10 mg Wed, Sat and 7.5 mg AOD.    Will have pt reduce dose x 1 day to 5 mg, then continue regimen    Repeat INR in 2 week(s).     Erika Collazo, PharmD

## 2021-01-25 ENCOUNTER — TELEPHONE (OUTPATIENT)
Dept: CARDIOLOGY | Facility: MEDICAL CENTER | Age: 86
End: 2021-01-25

## 2021-01-26 NOTE — TELEPHONE ENCOUNTER
Returned call. Pt is requesting to have his lab results from [facility] faxed to him at [phone]. Receipt confirmed.

## 2021-01-26 NOTE — TELEPHONE ENCOUNTER
TT    TO: 3pm/Mon/Ofc  NM: Michael Lockhart   PH: [phone]   PT NM: Michael Lockhart   : 37   REG DR: Dr Luong   RE: Would like a call back from the  nurse, has left several messages.   DISP HIST: 2021 02:38P NR p/disp  2021 02:44P  checked

## 2021-02-23 ENCOUNTER — ANTICOAGULATION MONITORING (OUTPATIENT)
Dept: VASCULAR LAB | Facility: MEDICAL CENTER | Age: 86
End: 2021-02-23

## 2021-02-23 DIAGNOSIS — I26.99 PULMONARY EMBOLISM, UNSPECIFIED CHRONICITY, UNSPECIFIED PULMONARY EMBOLISM TYPE, UNSPECIFIED WHETHER ACUTE COR PULMONALE PRESENT (HCC): ICD-10-CM

## 2021-02-23 DIAGNOSIS — D68.51 FACTOR V LEIDEN (HCC): ICD-10-CM

## 2021-02-23 LAB — INR PPP: 2.7 (ref 2–3.5)

## 2021-02-24 ENCOUNTER — TELEPHONE (OUTPATIENT)
Dept: CARDIOLOGY | Facility: MEDICAL CENTER | Age: 86
End: 2021-02-24

## 2021-02-24 NOTE — TELEPHONE ENCOUNTER
"I have attempted to fax labs 3 times, but get a \"no response\" notification each time. Fax number seems to be correct, but will not go through.   "

## 2021-02-24 NOTE — PROGRESS NOTES
OP   Telephone Anticoagulation Service Note      Anticoagulation Summary  As of 2021    INR goal:  2.0-3.0   TTR:  65.5 % (5.8 y)   INR used for dosin.70 (2021)   Warfarin maintenance plan:  10 mg (5 mg x 2) every Wed, Sat; 7.5 mg (7.5 mg x 1) all other days   Weekly warfarin total:  57.5 mg   No change documented:  Amalia Woods   Plan last modified:  Erika Collazo PharmD (2021)   Next INR check:  3/9/2021   Target end date:  Indefinite    Indications    Pulmonary embolism (HCC) [I26.99]  Stroke [434.91] [I63.9]  Factor V Leiden (HCC) [D68.51]             Anticoagulation Episode Summary     INR check location:      Preferred lab:      Send INR reminders to:      Comments:  [facility]      Anticoagulation Care Providers     Provider Role Specialty Phone number    Arcenio Bradley, PharmD Responsible          Anticoagulation Patient Findings    Spoke with the patient on the phone today, reporting a therapeutic INR of 2.7.  Confirmed the current warfarin dosing regimen and patient compliance.  Patient denies any interval changes to diet and/or medications. Patient denies any signs/symptoms of bleeding or clotting.  Patient instructed to continue with the current warfarin dosing regimen, and asked to follow up again in 2 weeks.     Erick BravoD

## 2021-02-24 NOTE — TELEPHONE ENCOUNTER
TT    Pt wife Adelita, called and is requesting lab slip be faxed to [facility] at [phone].    Pt is there and is fasting.     Please call back if needed at [phone].    Thank you

## 2021-02-26 ENCOUNTER — TELEPHONE (OUTPATIENT)
Dept: CARDIOLOGY | Facility: MEDICAL CENTER | Age: 86
End: 2021-02-26

## 2021-02-26 RX ORDER — SPIRONOLACTONE 25 MG/1
25 TABLET ORAL DAILY
Qty: 30 TABLET | Refills: 3 | Status: SHIPPED | OUTPATIENT
Start: 2021-02-26 | End: 2021-03-12 | Stop reason: SDUPTHER

## 2021-02-26 NOTE — TELEPHONE ENCOUNTER
TT    Patient called to advise they would like their spironolactone (ALDACTONE) 25 MG Tab to be sent to the [facility] in [place]. PT is all out of the medication. 90 day supply.    Thank you,  Jennifer KIRKLAND

## 2021-03-01 NOTE — TELEPHONE ENCOUNTER
Adelita, pt wife called and is asking for a call back to discuss lab work orders. Pt still hasn't had them done, [facility] never received when they were there last and they dont know if appt should be rescheduled or not.  Adelita would like a call to figure out a plan at [phone]

## 2021-03-03 ENCOUNTER — OFFICE VISIT (OUTPATIENT)
Dept: CARDIOLOGY | Facility: MEDICAL CENTER | Age: 86
End: 2021-03-03
Payer: MEDICARE

## 2021-03-03 VITALS
HEIGHT: 71 IN | RESPIRATION RATE: 18 BRPM | BODY MASS INDEX: 30.1 KG/M2 | HEART RATE: 87 BPM | WEIGHT: 215 LBS | SYSTOLIC BLOOD PRESSURE: 116 MMHG | OXYGEN SATURATION: 93 % | DIASTOLIC BLOOD PRESSURE: 64 MMHG

## 2021-03-03 DIAGNOSIS — I25.9 ISCHEMIC HEART DISEASE: ICD-10-CM

## 2021-03-03 DIAGNOSIS — I10 HTN (HYPERTENSION), MALIGNANT: ICD-10-CM

## 2021-03-03 DIAGNOSIS — E78.2 MIXED HYPERLIPIDEMIA: ICD-10-CM

## 2021-03-03 DIAGNOSIS — D68.51 FACTOR V LEIDEN (HCC): ICD-10-CM

## 2021-03-03 DIAGNOSIS — Z79.899 HIGH RISK MEDICATION USE: ICD-10-CM

## 2021-03-03 DIAGNOSIS — D68.59 HYPERCOAGULABLE STATE (HCC): ICD-10-CM

## 2021-03-03 PROCEDURE — 99214 OFFICE O/P EST MOD 30 MIN: CPT | Performed by: INTERNAL MEDICINE

## 2021-03-03 RX ORDER — PRAVASTATIN SODIUM 40 MG
TABLET ORAL
Qty: 90 TABLET | Refills: 3 | Status: SHIPPED | OUTPATIENT
Start: 2021-03-03 | End: 2022-05-04 | Stop reason: SDUPTHER

## 2021-03-03 RX ORDER — LISINOPRIL 10 MG/1
10 TABLET ORAL
Qty: 90 TABLET | Refills: 3 | Status: SHIPPED | OUTPATIENT
Start: 2021-03-03 | End: 2021-03-12

## 2021-03-03 ASSESSMENT — ENCOUNTER SYMPTOMS
DOUBLE VISION: 0
MYALGIAS: 0
PALPITATIONS: 0
BLURRED VISION: 0
CLAUDICATION: 0
FEVER: 0
BRUISES/BLEEDS EASILY: 0
COUGH: 0
EYE DISCHARGE: 0
DEPRESSION: 0
EYE PAIN: 0
SPEECH CHANGE: 0
CHILLS: 0
ORTHOPNEA: 0
LOSS OF CONSCIOUSNESS: 0
BLOOD IN STOOL: 0
SENSORY CHANGE: 0
NAUSEA: 0
HALLUCINATIONS: 0
DIZZINESS: 0
WEIGHT LOSS: 0
FALLS: 0
HEADACHES: 0
VOMITING: 0
ABDOMINAL PAIN: 0
PND: 0
SHORTNESS OF BREATH: 0

## 2021-03-03 NOTE — PROGRESS NOTES
Chief Complaint   Patient presents with   • Hypertension       Subjective:   Michael Lockhart is a 85 y.o. male who presents today for cardiac care of CAD (MI in 2003), HTN, HLP, Factor V Leiden, history of PE.      In the past in 05/2019, patient reported of feeling some chest pain at random times. Pressure-like sensation. No shortness of breath. We perform myocardial PET scan stress test was negative for coronary ischemia. LVEF on that test was about 70%.    01/2021 I have independently interpreted and reviewed echocardiogram's actual images with patient which showed normal left ventricular systolic function. No wall motion abnormality. No evidence of pulmonary hypertension. No significant valvular disease.     Still chronic dizziness due to Meniere's disease and visual problems. Had been seen at [facility].     I have independently interpreted and reviewed blood tests results with patient in clinic which shows normal LDL level 89, triglycerides 137, renal and liver function.    Propranolol did not help with tremors.    Past Medical History:   Diagnosis Date   • Acute, but ill-defined, cerebrovascular disease    • CAD (coronary artery disease)    • Chest pain, unspecified     History of recurrent chest pain withput evidence of acive ischemia by thallium   • General symptoms NEC     Fatigue with low blood pressure   • Headache(784.0)     Chronic   • Herpes zoster without mention of complication    • Ischemic cardiomyopathy    • Ischemic heart disease    • Long term (current) use of anticoagulants    • Other abnormal glucose    • Primary hypercoagulable state (HCC)     History of positive Factor V Leiden   • Pulmonary embolism (HCC)     Complication of cardiac catherization   • Pure hypercholesterolemia    • Rotator cuff (capsule) sprain    • Valvular heart disease      Past Surgical History:   Procedure Laterality Date   • ANGIOPLASTY  2003     Family History   Problem Relation Age of Onset   • Heart Disease Mother     • Arthritis Mother    • Other Sister         overweight   • Other Brother         tremors   • Clotting Disorder Brother         Factor V     Social History     Socioeconomic History   • Marital status:      Spouse name: Not on file   • Number of children: Not on file   • Years of education: Not on file   • Highest education level: Not on file   Occupational History   • Not on file   Tobacco Use   • Smoking status: Never Smoker   • Smokeless tobacco: Never Used   Substance and Sexual Activity   • Alcohol use: No   • Drug use: No   • Sexual activity: Not on file   Other Topics Concern   • Not on file   Social History Narrative   • Not on file     Social Determinants of Health     Financial Resource Strain:    • Difficulty of Paying Living Expenses:    Food Insecurity:    • Worried About Running Out of Food in the Last Year:    • Ran Out of Food in the Last Year:    Transportation Needs:    • Lack of Transportation (Medical):    • Lack of Transportation (Non-Medical):    Physical Activity:    • Days of Exercise per Week:    • Minutes of Exercise per Session:    Stress:    • Feeling of Stress :    Social Connections:    • Frequency of Communication with Friends and Family:    • Frequency of Social Gatherings with Friends and Family:    • Attends Religion Services:    • Active Member of Clubs or Organizations:    • Attends Club or Organization Meetings:    • Marital Status:    Intimate Partner Violence:    • Fear of Current or Ex-Partner:    • Emotionally Abused:    • Physically Abused:    • Sexually Abused:      Allergies   Allergen Reactions   • Finasteride Swelling     Hand swelling, stopped medications after taking for three days   • Zetia [Ezetimibe]      N/V     Outpatient Encounter Medications as of 3/3/2021   Medication Sig Dispense Refill   • spironolactone (ALDACTONE) 25 MG Tab Take 1 tablet by mouth every day. 30 tablet 3   • carbidopa-levodopa (SINEMET)  MG Tab TAKE 1 TABLET BY MOUTH TWICE A  DAY     • pravastatin (PRAVACHOL) 40 MG tablet TAKE 1 TABLET DAILY 90 Tab 3   • warfarin (COUMADIN) 5 MG Tab Take two tablets by mouth daily (alternating with 7.5mg tablet) or as directed by anticoagulation clinic 90 Tab 1   • warfarin (COUMADIN) 7.5 MG Tab TAKE 1 TABLET DAILY OR AS DIRECTED BY COUMADIN CLINIC 90 Tab 4   • lisinopril (PRINIVIL) 10 MG Tab Take 1 Tab by mouth 1 time daily as needed. 90 Tab 0   • pramipexole (MIRAPEX) 0.25 MG Tab TAKE 1 TABLET BY MOUTH TWICE DAILY FOR 30 DAYS  1   • nitroGLYCERIN (NITROSTAT) 0.3 MG SL tablet Place 1 Tab under tongue 1 time daily as needed for Chest Pain. 100 Tab 2   • vitamin D (CHOLECALCIFEROL) 1000 UNIT TABS Take 5,000 Units by mouth every day.     • B Complex Vitamins (VITAMIN B COMPLEX PO) Take 1 Tab by mouth every day.       No facility-administered encounter medications on file as of 3/3/2021.     Review of Systems   Constitutional: Negative for chills, fever, malaise/fatigue and weight loss.   HENT: Negative for ear discharge, ear pain, hearing loss and nosebleeds.    Eyes: Negative for blurred vision, double vision, pain and discharge.   Respiratory: Negative for cough and shortness of breath.    Cardiovascular: Negative for chest pain, palpitations, orthopnea, claudication, leg swelling and PND.   Gastrointestinal: Negative for abdominal pain, blood in stool, melena, nausea and vomiting.   Genitourinary: Negative for dysuria and hematuria.   Musculoskeletal: Negative for falls, joint pain and myalgias.   Skin: Negative for itching and rash.   Neurological: Negative for dizziness, sensory change, speech change, loss of consciousness and headaches.   Endo/Heme/Allergies: Negative for environmental allergies. Does not bruise/bleed easily.   Psychiatric/Behavioral: Negative for depression, hallucinations and suicidal ideas.        Objective:   /64 (BP Location: Left arm, Patient Position: Sitting, BP Cuff Size: Adult)   Pulse 87   Resp 18   Ht 1.803 m (5'  "11\")   Wt 97.5 kg (215 lb)   SpO2 93%   BMI 29.99 kg/m²     Physical Exam   Constitutional: He is oriented to person, place, and time. No distress.   HENT:   Head: Normocephalic and atraumatic.   Right Ear: External ear normal.   Left Ear: External ear normal.   Eyes: Right eye exhibits no discharge. Left eye exhibits no discharge.   Neck: No JVD present. No thyromegaly present.   Cardiovascular: Normal rate, regular rhythm and intact distal pulses.   Ausculation was not performed to minimize the risk of COVID spread during current pandemic. This complies with Medicare policies and guidelines.     Pulmonary/Chest: No respiratory distress.   Abdominal: He exhibits no distension. There is no abdominal tenderness.   Musculoskeletal:         General: No edema.   Neurological: He is alert and oriented to person, place, and time. No cranial nerve deficit.   Skin: Skin is warm and dry. He is not diaphoretic.   Psychiatric: He has a normal mood and affect. His behavior is normal.   Nursing note and vitals reviewed.      Assessment:     1. HTN (hypertension), malignant     2. Mixed hyperlipidemia     3. Ischemic heart disease     4. Factor V Leiden (HCC)     5. Hypercoagulable state (HCC)     6. High risk medication use         Medical Decision Making:  Today's Assessment / Status / Plan:   Palpitations:  NO more Metoprolol 25 mg bid.  NO more Propranalol 40 mg tid for benign tremors. Did not work.     Hypertension:  Blood pressure is well controlled.  Lisinopril 10 mg as needed.  Will start Spironolactone for swelling as well.     Hyperlipidemia:  Continue Pravastatin 40 mg po daily.    Chest pain has some atypical features.     Continue blood thinner with coumadin for Factor V Leiden.     I will see patient back in clinic with lab tests and studies results in 12 months.     I thank you for referring patient to our Cardiology Clinic today.  "

## 2021-03-12 ENCOUNTER — ANTICOAGULATION MONITORING (OUTPATIENT)
Dept: VASCULAR LAB | Facility: MEDICAL CENTER | Age: 86
End: 2021-03-12

## 2021-03-12 ENCOUNTER — TELEPHONE (OUTPATIENT)
Dept: CARDIOLOGY | Facility: MEDICAL CENTER | Age: 86
End: 2021-03-12

## 2021-03-12 DIAGNOSIS — I63.9 CEREBROVASCULAR ACCIDENT (CVA), UNSPECIFIED MECHANISM (HCC): ICD-10-CM

## 2021-03-12 DIAGNOSIS — I26.99 PULMONARY EMBOLISM, UNSPECIFIED CHRONICITY, UNSPECIFIED PULMONARY EMBOLISM TYPE, UNSPECIFIED WHETHER ACUTE COR PULMONALE PRESENT (HCC): ICD-10-CM

## 2021-03-12 DIAGNOSIS — D68.51 FACTOR V LEIDEN (HCC): ICD-10-CM

## 2021-03-12 RX ORDER — SPIRONOLACTONE 25 MG/1
25 TABLET ORAL DAILY
Qty: 90 TABLET | Refills: 3 | Status: SHIPPED | OUTPATIENT
Start: 2021-03-12 | End: 2021-03-26 | Stop reason: SDUPTHER

## 2021-03-12 NOTE — TELEPHONE ENCOUNTER
I called him.  He no longer takes the Lisinopril and asks for it to be removed from his MAR.  He wants the [place] refilled #90 and I did that for him. He is appreciative.

## 2021-03-12 NOTE — TELEPHONE ENCOUNTER
TT    Pt called stating [facility] on [address] did not receive the prescription for spironolactone (ALDACTONE) 25 MG Tab. Pt is asking that it be sent again. Pt also states he received a prescription for lisinopril, but he doesn't take lisinopril. Please call Pt back to discuss at  [phone].    Thank you

## 2021-03-13 NOTE — PROGRESS NOTES
OP   Telephone Anticoagulation Service Note      Anticoagulation Summary  As of 3/12/2021    INR goal:  2.0-3.0   TTR:  65.5 % (5.8 y)   INR used for dosing:  No new INR was available at the time of this encounter.   Warfarin maintenance plan:  10 mg (5 mg x 2) every Wed, Sat; 7.5 mg (7.5 mg x 1) all other days   Weekly warfarin total:  57.5 mg   Plan last modified:  Erika Collazo PharmD (1/20/2021)   Next INR check:  3/23/2021   Target end date:  Indefinite    Indications    Pulmonary embolism (HCC) [I26.99]  Stroke [434.91] [I63.9]  Factor V Leiden (HCC) [D68.51]             Anticoagulation Episode Summary     INR check location:      Preferred lab:      Send INR reminders to:      Comments:  Marcial      Anticoagulation Care Providers     Provider Role Specialty Phone number    Arcenio Bradley PharmD Responsible          Anticoagulation Patient Findings    Spoke with the patient on the phone today, reporting no current INR, as the patient prefers to only test once a month charlie if his INR is therapeutic, but a duplicate INR of 2.7 from 2/23/21 was sent in.   We already spoke about his previous INR results of 2.7, and patient reports no interval changes.  Patient denies any interval changes to diet and/or medications. Patient denies any signs/symptoms of bleeding or clotting.  Patient instructed to continue with the current warfarin dosing regimen, and asked to follow up again in 2 weeks.     Erick Woods PharmD

## 2021-03-22 DIAGNOSIS — Z79.01 LONG TERM CURRENT USE OF ANTICOAGULANT THERAPY: Primary | ICD-10-CM

## 2021-03-22 RX ORDER — WARFARIN SODIUM 5 MG/1
TABLET ORAL
Qty: 90 TABLET | Refills: 1 | Status: SHIPPED | OUTPATIENT
Start: 2021-03-22 | End: 2022-07-13 | Stop reason: SDUPTHER

## 2021-03-26 ENCOUNTER — TELEPHONE (OUTPATIENT)
Dept: CARDIOLOGY | Facility: MEDICAL CENTER | Age: 86
End: 2021-03-26

## 2021-03-26 DIAGNOSIS — I10 HTN (HYPERTENSION), MALIGNANT: Primary | ICD-10-CM

## 2021-03-26 RX ORDER — SPIRONOLACTONE 25 MG/1
25 TABLET ORAL DAILY
Qty: 90 TABLET | Refills: 3 | Status: SHIPPED | OUTPATIENT
Start: 2021-03-26 | End: 2021-03-29

## 2021-03-26 NOTE — TELEPHONE ENCOUNTER
TT    Patient [facility] called and stated [facility] spiron[facility]actone (ALDACTONE) 25 MG Tab prescription. [facility] [address]    Thank you,    Jagruti WILLSON

## 2021-03-29 ENCOUNTER — ANTICOAGULATION MONITORING (OUTPATIENT)
Dept: MEDICAL GROUP | Facility: MEDICAL CENTER | Age: 86
End: 2021-03-29

## 2021-03-29 DIAGNOSIS — D68.51 FACTOR V LEIDEN (HCC): ICD-10-CM

## 2021-03-29 LAB — INR PPP: 1.6 (ref 2–3.5)

## 2021-03-29 RX ORDER — SPIRONOLACTONE 25 MG/1
25 TABLET ORAL DAILY
Qty: 90 TABLET | Refills: 3 | Status: SHIPPED | OUTPATIENT
Start: 2021-03-29 | End: 2022-03-02 | Stop reason: SDUPTHER

## 2021-03-29 NOTE — PROGRESS NOTES
Anticoagulation Summary  As of 3/29/2021    INR goal:  2.0-3.0   TTR:  65.4 % (5.9 y)   INR used for dosin.60 (3/29/2021)   Warfarin maintenance plan:  10 mg (5 mg x 2) every Wed, Sat; 7.5 mg (7.5 mg x 1) all other days   Weekly warfarin total:  57.5 mg   Plan last modified:  Erika Collazo PharmD (2021)   Next INR check:  2021   Target end date:  Indefinite    Indications    Pulmonary embolism (HCC) [I26.99]  Stroke [434.91] [I63.9]  Factor V Leiden (HCC) [D68.51]             Anticoagulation Episode Summary     INR check location:      Preferred lab:      Send INR reminders to:      Comments:  Marcial      Anticoagulation Care Providers     Provider Role Specialty Phone number    Lawrence ArevaloD Responsible          Anticoagulation Patient Findings  Patient Findings     Negatives:  Signs/symptoms of thrombosis, Signs/symptoms of bleeding, Laboratory test error suspected, Change in health, Change in alcohol use, Change in activity, Upcoming invasive procedure, Emergency department visit, Upcoming dental procedure, Missed doses, Extra doses, Change in medications, Change in diet/appetite, Hospital admission, Bruising, Other complaints        Spoke with patient today regarding subtherapeutic INR of 1.6.  Patient denies any signs/symptoms of bruising or bleeding or any changes in diet and medications.  Instructed patient to call clinic with any questions or concerns.  Instructed patient to bolus with 10mg X 1, then resume current warfarin regimen.  Follow up in 2 weeks, to reduce risk of adverse events related to this high risk medication,  Warfarin.    Lamont iWll, LawrenceD, BCACP

## 2021-05-01 LAB — INR PPP: 1.9 (ref 2–3.5)

## 2021-05-03 ENCOUNTER — ANTICOAGULATION MONITORING (OUTPATIENT)
Dept: VASCULAR LAB | Facility: MEDICAL CENTER | Age: 86
End: 2021-05-03

## 2021-05-03 DIAGNOSIS — D68.51 FACTOR V LEIDEN (HCC): ICD-10-CM

## 2021-05-03 NOTE — PROGRESS NOTES
Anticoagulation Summary  As of 5/3/2021    INR goal:  2.0-3.0   TTR:  64.4 % (5.9 y)   INR used for dosin.90 (2021)   Warfarin maintenance plan:  10 mg (5 mg x 2) every Wed, Sat; 7.5 mg (7.5 mg x 1) all other days   Weekly warfarin total:  57.5 mg   Plan last modified:  Erika Collazo PharmD (2021)   Next INR check:  2021   Target end date:  Indefinite    Indications    Pulmonary embolism (HCC) [I26.99]  Stroke [434.91] [I63.9]  Factor V Leiden (HCC) [D68.51]             Anticoagulation Episode Summary     INR check location:      Preferred lab:      Send INR reminders to:      Comments:  Marcial      Anticoagulation Care Providers     Provider Role Specialty Phone number    Arcenio Bradley, PharmD Responsible          Anticoagulation Patient Findings      Spoke with pt.  INR was slightly subtherapeutic.   Pt denies any unusual s/s of bleeding, bruising, clotting or any changes to diet or medications. Denies any etoh, cranberries, supplements, or illness.   Pt verifies warfarin weekly dosing. Pt self bolused with 15 mg on .    Will have pt continue regimen    Repeat INR in 1 week(s).     Erika Collazo, PharmD

## 2021-06-03 ENCOUNTER — ANTICOAGULATION MONITORING (OUTPATIENT)
Dept: VASCULAR LAB | Facility: MEDICAL CENTER | Age: 86
End: 2021-06-03

## 2021-06-03 DIAGNOSIS — D68.51 FACTOR V LEIDEN (HCC): ICD-10-CM

## 2021-06-03 LAB — INR PPP: 2.4 (ref 2–3.5)

## 2021-06-03 NOTE — PROGRESS NOTES
Anticoagulation Summary  As of 6/3/2021    INR goal:  2.0-3.0   TTR:  64.7 % (6 y)   INR used for dosin.40 (6/3/2021)   Warfarin maintenance plan:  10 mg (5 mg x 2) every Wed, Sat; 7.5 mg (7.5 mg x 1) all other days   Weekly warfarin total:  57.5 mg   Plan last modified:  Erika Collazo, PharmD (2021)   Next INR check:  2021   Target end date:  Indefinite    Indications    Pulmonary embolism (HCC) [I26.99]  Stroke [434.91] [I63.9]  Factor V Leiden (HCC) [D68.51]             Anticoagulation Episode Summary     INR check location:      Preferred lab:      Send INR reminders to:      Comments:  Marcial      Anticoagulation Care Providers     Provider Role Specialty Phone number    Arcenio Bradley, PharmD Responsible          Anticoagulation Patient Findings  Patient Findings     Negatives:  Signs/symptoms of thrombosis, Signs/symptoms of bleeding, Laboratory test error suspected, Change in health, Change in alcohol use, Change in activity, Upcoming invasive procedure, Emergency department visit, Upcoming dental procedure, Missed doses, Extra doses, Change in medications, Change in diet/appetite, Hospital admission, Bruising, Other complaints              Spoke with patient today regarding therapeutic INR of 2.4.  Patient denies any signs/symptoms of bruising or bleeding or any changes in diet and medications.  Instructed patient to call clinic with any questions or concerns.    Pt is to continue with current warfarin dosing regimen.    Follow up in 2 weeks, to reduce risk of adverse events related to this high risk medication,  Warfarin.    Jeanine Samaniego, Pharmacy Intern

## 2021-07-07 ENCOUNTER — ANTICOAGULATION MONITORING (OUTPATIENT)
Dept: VASCULAR LAB | Facility: MEDICAL CENTER | Age: 86
End: 2021-07-07

## 2021-07-07 ENCOUNTER — TELEPHONE (OUTPATIENT)
Dept: VASCULAR LAB | Facility: MEDICAL CENTER | Age: 86
End: 2021-07-07

## 2021-07-07 DIAGNOSIS — D68.51 FACTOR V LEIDEN (HCC): ICD-10-CM

## 2021-07-07 LAB — INR PPP: 3.1 (ref 2–3.5)

## 2021-07-07 NOTE — PROGRESS NOTES
Anticoagulation Summary  As of 7/7/2021    INR goal:  2.0-3.0   TTR:  65.0 % (6.1 y)   INR used for dosing:  3.10 (7/7/2021)   Warfarin maintenance plan:  10 mg (5 mg x 2) every Wed, Sat; 7.5 mg (7.5 mg x 1) all other days   Weekly warfarin total:  57.5 mg   Plan last modified:  Lawrence IqbalD (1/20/2021)   Next INR check:  7/21/2021   Target end date:  Indefinite    Indications    Pulmonary embolism (HCC) [I26.99]  Stroke [434.91] [I63.9]  Factor V Leiden (HCC) [D68.51]             Anticoagulation Episode Summary     INR check location:      Preferred lab:      Send INR reminders to:      Comments:  Marcial      Anticoagulation Care Providers     Provider Role Specialty Phone number    Arcenio Bradley, PharmD Responsible          Anticoagulation Patient Findings     Left voicemail message to report a supratherapeutic INR of 3.1.  Pt to take 5 mg tonight then continue with current warfarin dosing regimen. Requested pt contact the clinic for any s/s of unusual bleeding, bruising, clotting or any changes to diet or medication. FU 2 weeks.    Shy Cline, Pharmacy Intern     Discussed with Lamont Will, PharmD

## 2021-08-10 ENCOUNTER — ANTICOAGULATION MONITORING (OUTPATIENT)
Dept: VASCULAR LAB | Facility: MEDICAL CENTER | Age: 86
End: 2021-08-10

## 2021-08-10 DIAGNOSIS — D68.51 FACTOR V LEIDEN (HCC): ICD-10-CM

## 2021-08-10 LAB — INR PPP: 3.2 (ref 2–3.5)

## 2021-08-10 NOTE — PROGRESS NOTES
Anticoagulation Summary  As of 8/10/2021    INR goal:  2.0-3.0   TTR:  64.0 % (6.2 y)   INR used for dosing:  3.20 (8/10/2021)   Warfarin maintenance plan:  10 mg (5 mg x 2) every Sat; 7.5 mg (7.5 mg x 1) all other days   Weekly warfarin total:  55 mg   Plan last modified:  Lamont Will PharmD (8/10/2021)   Next INR check:  8/24/2021   Target end date:  Indefinite    Indications    Pulmonary embolism (HCC) [I26.99]  Stroke [434.91] [I63.9]  Factor V Leiden (HCC) [D68.51]             Anticoagulation Episode Summary     INR check location:      Preferred lab:      Send INR reminders to:      Comments:  Alere      Anticoagulation Care Providers     Provider Role Specialty Phone number    Arcenio Bradley, PharmD Responsible          Anticoagulation Patient Findings  Patient Findings     Negatives:  Signs/symptoms of thrombosis, Signs/symptoms of bleeding, Laboratory test error suspected, Change in health, Change in alcohol use, Change in activity, Upcoming invasive procedure, Emergency department visit, Upcoming dental procedure, Missed doses, Extra doses, Change in medications, Change in diet/appetite, Hospital admission, Bruising, Other complaints        Spoke with patient today regarding surpatherapeutic INR of 3.2.  Patient denies any signs/symptoms of bruising or bleeding or any changes in diet and medications.  Instructed patient to call clinic with any questions or concerns.  Instructed patient to decrease weekly warfarin regimen as detailed above.  Follow up in 2 weeks, to reduce risk of adverse events related to this high risk medication,  Warfarin.    Lamont Will, Kasey, BCACP

## 2021-09-09 LAB — INR PPP: 2.7 (ref 2–3.5)

## 2021-09-10 ENCOUNTER — ANTICOAGULATION MONITORING (OUTPATIENT)
Dept: VASCULAR LAB | Facility: MEDICAL CENTER | Age: 86
End: 2021-09-10

## 2021-09-10 DIAGNOSIS — D68.51 FACTOR V LEIDEN (HCC): ICD-10-CM

## 2021-09-10 DIAGNOSIS — I63.9 CEREBROVASCULAR ACCIDENT (CVA), UNSPECIFIED MECHANISM (HCC): ICD-10-CM

## 2021-09-10 NOTE — PROGRESS NOTES
OP Telephone Anticoagulation Service Note    Date: 9/10/2021      Anticoagulation Summary  As of 9/10/2021    INR goal:  2.0-3.0   TTR:  64.0 % (6.3 y)   INR used for dosin.70 (2021)   Warfarin maintenance plan:  10 mg (5 mg x 2) every Sat; 7.5 mg (7.5 mg x 1) all other days   Weekly warfarin total:  55 mg   Plan last modified:  Lamont Will, PharmD (8/10/2021)   Next INR check:  2021   Target end date:  Indefinite    Indications    Pulmonary embolism (HCC) [I26.99]  Stroke [434.91] [I63.9]  Factor V Leiden (HCC) [D68.51]             Anticoagulation Episode Summary     INR check location:      Preferred lab:      Send INR reminders to:      Comments:  Marcial      Anticoagulation Care Providers     Provider Role Specialty Phone number    Arcenio Bradley, PharmD Responsible          Anticoagulation Patient Findings  Patient Findings     Negatives:  Signs/symptoms of thrombosis, Signs/symptoms of bleeding, Laboratory test error suspected, Change in health, Change in alcohol use, Change in activity, Upcoming invasive procedure, Emergency department visit, Upcoming dental procedure, Missed doses, Extra doses, Change in medications, Change in diet/appetite, Hospital admission, Bruising, Other complaints          INR therapeutic at 2.7.  Spoke w/ pt on phone.  Verified regimen w/ pt.  Instructed pt to continue current warfarin regimen.  NO s/s bleeding reported per pt.  NO changes in diet reported per pt.  NO changes in medications reported per pt.  Check INR in 2 week(s).  Instructed pt to call clinic at [phone] if there are any questions.  Pt stated understanding.    Pt is not on antiplatelet therapy.    Janell Woodson, Pharmacy Intern.

## 2021-10-08 ENCOUNTER — ANTICOAGULATION MONITORING (OUTPATIENT)
Dept: MEDICAL GROUP | Facility: PHYSICIAN GROUP | Age: 86
End: 2021-10-08

## 2021-10-08 ENCOUNTER — TELEPHONE (OUTPATIENT)
Dept: VASCULAR LAB | Facility: MEDICAL CENTER | Age: 86
End: 2021-10-08

## 2021-10-08 DIAGNOSIS — D68.51 FACTOR V LEIDEN (HCC): ICD-10-CM

## 2021-10-08 DIAGNOSIS — I26.99 PULMONARY EMBOLISM, UNSPECIFIED CHRONICITY, UNSPECIFIED PULMONARY EMBOLISM TYPE, UNSPECIFIED WHETHER ACUTE COR PULMONALE PRESENT (HCC): ICD-10-CM

## 2021-10-08 DIAGNOSIS — I63.9 CEREBROVASCULAR ACCIDENT (CVA), UNSPECIFIED MECHANISM (HCC): ICD-10-CM

## 2021-10-08 LAB — INR PPP: 3.3 (ref 2–3.5)

## 2021-10-09 NOTE — PROGRESS NOTES
OP   Telephone Anticoagulation Service Note      Anticoagulation Summary  As of 10/8/2021    INR goal:  2.0-3.0   TTR:  63.8 % (6.4 y)   INR used for dosing:  3.30 (10/8/2021)   Warfarin maintenance plan:  10 mg (5 mg x 2) every Sat; 7.5 mg (7.5 mg x 1) all other days   Weekly warfarin total:  55 mg   Plan last modified:  Lawrence BacaD (8/10/2021)   Next INR check:  10/22/2021   Target end date:  Indefinite    Indications    Pulmonary embolism (HCC) [I26.99]  Stroke [434.91] [I63.9]  Factor V Leiden (HCC) [D68.51]             Anticoagulation Episode Summary     INR check location:      Preferred lab:      Send INR reminders to:      Comments:  Marcial      Anticoagulation Care Providers     Provider Role Specialty Phone number    Arcenio Bradley, PharmD Responsible          Anticoagulation Patient Findings  Patient Findings     Positives:  Change in diet/appetite (less greens)    Negatives:  Signs/symptoms of thrombosis, Signs/symptoms of bleeding, Laboratory test error suspected, Change in health, Change in alcohol use, Change in activity, Upcoming invasive procedure, Emergency department visit, Upcoming dental procedure, Missed doses, Extra doses, Change in medications, Hospital admission, Bruising, Other complaints        Spoke with the patient on the phone today, reporting a SUPRA-therapeutic INR of 3.3.  Confirmed the current warfarin dosing regimen and patient compliance.  Patient may have eaten less greens over the last week.   Patient denies any interval changes to medications.   Patient denies any signs/symptoms of bleeding or clotting.  Patient instructed to reduce dose to 3.75mg TONIGHT, as a one time adjustment, then will resume his current dosing regimen.   Patient will retest again in 2 weeks.     Erick BravoD

## 2021-11-09 ENCOUNTER — TELEPHONE (OUTPATIENT)
Dept: VASCULAR LAB | Facility: MEDICAL CENTER | Age: 86
End: 2021-11-09

## 2021-11-09 ENCOUNTER — ANTICOAGULATION MONITORING (OUTPATIENT)
Dept: VASCULAR LAB | Facility: MEDICAL CENTER | Age: 86
End: 2021-11-09

## 2021-11-09 DIAGNOSIS — D68.51 FACTOR V LEIDEN (HCC): ICD-10-CM

## 2021-11-09 LAB — INR PPP: 2.2 (ref 2–3.5)

## 2021-11-10 NOTE — PROGRESS NOTES
Anticoagulation Summary  As of 2021    INR goal:  2.0-3.0   TTR:  63.9 % (6.5 y)   INR used for dosin.20 (2021)   Warfarin maintenance plan:  10 mg (5 mg x 2) every Sat; 7.5 mg (7.5 mg x 1) all other days   Weekly warfarin total:  55 mg   Plan last modified:  Lamont iWll, PharmD (8/10/2021)   Next INR check:  2021   Target end date:  Indefinite    Indications    Pulmonary embolism (HCC) [I26.99]  Stroke [434.91] [I63.9]  Factor V Leiden (HCC) [D68.51]             Anticoagulation Episode Summary     INR check location:      Preferred lab:      Send INR reminders to:      Comments:  Marcial      Anticoagulation Care Providers     Provider Role Specialty Phone number    Arcenio Bradley, PharmD Responsible            Refer to Anticoagulation Patient Findings for HPI  Patient Findings     Negatives:  Signs/symptoms of thrombosis, Signs/symptoms of bleeding, Laboratory test error suspected, Change in health, Change in alcohol use, Change in activity, Upcoming invasive procedure, Emergency department visit, Upcoming dental procedure, Missed doses, Extra doses, Change in medications, Change in diet/appetite, Hospital admission, Bruising, Other complaints          Spoke with patient to report a therapeutic INR.      Pt is NOT on antiplatelet therapy.     Pt instructed to continue with current warfarin dosing regimen, confirms dosing.   Will follow up in 2 week(s).     Sally Clayton, Pharmacy Intern

## 2021-12-14 ENCOUNTER — ANTICOAGULATION MONITORING (OUTPATIENT)
Dept: VASCULAR LAB | Facility: MEDICAL CENTER | Age: 86
End: 2021-12-14

## 2021-12-14 DIAGNOSIS — D68.51 FACTOR V LEIDEN (HCC): ICD-10-CM

## 2021-12-14 LAB — INR PPP: 2.7 (ref 2–3.5)

## 2022-01-18 ENCOUNTER — ANTICOAGULATION MONITORING (OUTPATIENT)
Dept: MEDICAL GROUP | Facility: MEDICAL CENTER | Age: 87
End: 2022-01-18

## 2022-01-18 DIAGNOSIS — D68.51 FACTOR V LEIDEN (HCC): ICD-10-CM

## 2022-01-18 DIAGNOSIS — I63.9 CEREBROVASCULAR ACCIDENT (CVA), UNSPECIFIED MECHANISM (HCC): ICD-10-CM

## 2022-01-18 DIAGNOSIS — I26.99 PULMONARY EMBOLISM, UNSPECIFIED CHRONICITY, UNSPECIFIED PULMONARY EMBOLISM TYPE, UNSPECIFIED WHETHER ACUTE COR PULMONALE PRESENT (HCC): ICD-10-CM

## 2022-01-18 LAB — INR PPP: 1.9 (ref 2–3.5)

## 2022-01-18 NOTE — PROGRESS NOTES
Anticoagulation Summary  As of 2022    INR goal:  2.0-3.0   TTR:  64.8 % (6.7 y)   INR used for dosin.90 (2022)   Warfarin maintenance plan:  10 mg (5 mg x 2) every Sat; 7.5 mg (7.5 mg x 1) all other days   Weekly warfarin total:  55 mg   Plan last modified:  Carolyn Briones, PhT (2022)   Next INR check:  2022   Target end date:  Indefinite    Indications    Pulmonary embolism (HCC) [I26.99]  Stroke [434.91] [I63.9]  Factor V Leiden (HCC) [D68.51]             Anticoagulation Episode Summary     INR check location:      Preferred lab:      Send INR reminders to:      Comments:  Alere      Anticoagulation Care Providers     Provider Role Specialty Phone number    Arcenio Bradley, PharmD Responsible            Left voicemail message to report a slightly sub-therapeutic INR of 1.9.      Will have pt take increased dose of warfarin today of 10 mg and then resume dosing regimen.     Requested pt contact the clinic for any s/s of unusual bleeding, bruising, clotting or any changes to diet or medication.    FU INR in 2 week(s).    Carolyn Briones, Pharmacy Intern  Discussed with Erika Rea, LawrenceD

## 2022-02-17 ENCOUNTER — TELEPHONE (OUTPATIENT)
Dept: VASCULAR LAB | Facility: MEDICAL CENTER | Age: 87
End: 2022-02-17
Payer: MEDICARE

## 2022-02-21 LAB — INR PPP: 2.5 (ref 2–3.5)

## 2022-02-22 ENCOUNTER — ANTICOAGULATION MONITORING (OUTPATIENT)
Dept: VASCULAR LAB | Facility: MEDICAL CENTER | Age: 87
End: 2022-02-22
Payer: MEDICARE

## 2022-02-22 DIAGNOSIS — D68.51 FACTOR V LEIDEN (HCC): ICD-10-CM

## 2022-02-22 NOTE — PROGRESS NOTES
Anticoagulation Summary  As of 2022    INR goal:  2.0-3.0   TTR:  65.0 % (6.8 y)   INR used for dosin.50 (2022)   Warfarin maintenance plan:  10 mg (5 mg x 2) every Sat; 7.5 mg (7.5 mg x 1) all other days   Weekly warfarin total:  55 mg   Plan last modified:  Carolyn Briones PhT (2022)   Next INR check:  3/22/2022   Target end date:  Indefinite    Indications    Pulmonary embolism (HCC) [I26.99]  Stroke [434.91] [I63.9]  Factor V Leiden (HCC) [D68.51]             Anticoagulation Episode Summary     INR check location:      Preferred lab:      Send INR reminders to:      Comments:  Alejanette      Anticoagulation Care Providers     Provider Role Specialty Phone number    Arcenio Bradley, PharmD Responsible          Anticoagulation Patient Findings          Spoke with Michael to report a therapeutic INR of 2.5. Continue current dosing regimen.  Follow up in 4 weeks, to reduce the risk of adverse events related to this high risk medication, warfarin.    Evie Barron, Clinical Pharmacist

## 2022-03-02 DIAGNOSIS — I10 HTN (HYPERTENSION), MALIGNANT: ICD-10-CM

## 2022-03-02 RX ORDER — SPIRONOLACTONE 25 MG/1
25 TABLET ORAL DAILY
Qty: 90 TABLET | Refills: 0 | Status: SHIPPED | OUTPATIENT
Start: 2022-03-02 | End: 2022-05-04

## 2022-03-26 LAB — INR PPP: 2.3 (ref 2–3.5)

## 2022-03-28 ENCOUNTER — ANTICOAGULATION MONITORING (OUTPATIENT)
Dept: VASCULAR LAB | Facility: MEDICAL CENTER | Age: 87
End: 2022-03-28
Payer: MEDICARE

## 2022-03-28 DIAGNOSIS — D68.51 FACTOR V LEIDEN (HCC): ICD-10-CM

## 2022-03-29 NOTE — PROGRESS NOTES
Anticoagulation Summary  As of 3/28/2022    INR goal:  2.0-3.0   TTR:  65.5 % (6.8 y)   INR used for dosin.30 (3/26/2022)   Warfarin maintenance plan:  10 mg (5 mg x 2) every Sat; 7.5 mg (7.5 mg x 1) all other days   Weekly warfarin total:  55 mg   Plan last modified:  Carolyn Briones PhT (2022)   Next INR check:  2022   Target end date:  Indefinite    Indications    Pulmonary embolism (HCC) [I26.99]  Stroke [434.91] [I63.9]  Factor V Leiden (HCC) [D68.51]             Anticoagulation Episode Summary     INR check location:      Preferred lab:      Send INR reminders to:      Comments:  Marcial      Anticoagulation Care Providers     Provider Role Specialty Phone number    Arcenio Bradley, PharmD Responsible          Anticoagulation Patient Findings  Patient Findings     Negatives:  Signs/symptoms of thrombosis, Signs/symptoms of bleeding, Laboratory test error suspected, Change in health, Change in alcohol use, Change in activity, Upcoming invasive procedure, Emergency department visit, Upcoming dental procedure, Missed doses, Extra doses, Change in medications, Change in diet/appetite, Hospital admission, Bruising, Other complaints        Spoke with patient's wife today regarding therapeutic INR of 2.3.  Patient denies any signs/symptoms of bruising or bleeding or any changes in diet and medications.  Instructed patient to call clinic with any questions or concerns.    Pt is not on antiplatelet therapy    Pt is to continue with current warfarin dosing regimen.  Follow up in 4 weeks, to reduce risk of adverse events related to this high risk medication,  Warfarin.    Joselito Ferreira, Pharmacy Intern

## 2022-04-28 ENCOUNTER — ANTICOAGULATION MONITORING (OUTPATIENT)
Dept: VASCULAR LAB | Facility: MEDICAL CENTER | Age: 87
End: 2022-04-28
Payer: MEDICARE

## 2022-04-28 DIAGNOSIS — D68.51 FACTOR V LEIDEN (HCC): ICD-10-CM

## 2022-04-28 LAB — INR PPP: 2.9 (ref 2–3.5)

## 2022-04-28 NOTE — PROGRESS NOTES
Anticoagulation Summary  As of 2022    INR goal:  2.0-3.0   TTR:  65.9 % (6.9 y)   INR used for dosin.90 (2022)   Warfarin maintenance plan:  10 mg (5 mg x 2) every Sat; 7.5 mg (7.5 mg x 1) all other days   Weekly warfarin total:  55 mg   Plan last modified:  Carolyn Briones PhT (2022)   Next INR check:  2022   Target end date:  Indefinite    Indications    Pulmonary embolism (HCC) [I26.99]  Stroke [434.91] [I63.9]  Factor V Leiden (HCC) [D68.51]             Anticoagulation Episode Summary     INR check location:      Preferred lab:      Send INR reminders to:      Comments:  Alere      Anticoagulation Care Providers     Provider Role Specialty Phone number    Arcenio Bradley, PharmD Responsible          Anticoagulation Patient Findings      Left voicemail message to report a therapeutic INR of 2.9.      Pt to continue with current warfarin dosing regimen. Requested pt contact the clinic for any s/s of unusual bleeding, bruising, clotting or any changes to diet or medication.    FU INR in 2 week(s).    Erika Rea, PharmD

## 2022-05-04 ENCOUNTER — TELEPHONE (OUTPATIENT)
Dept: CARDIOLOGY | Facility: MEDICAL CENTER | Age: 87
End: 2022-05-04
Payer: MEDICARE

## 2022-05-04 DIAGNOSIS — I10 HTN (HYPERTENSION), MALIGNANT: ICD-10-CM

## 2022-05-04 RX ORDER — PRAVASTATIN SODIUM 40 MG
TABLET ORAL
Qty: 90 TABLET | Refills: 0 | Status: SHIPPED | OUTPATIENT
Start: 2022-05-04 | End: 2022-08-01

## 2022-05-04 NOTE — TELEPHONE ENCOUNTER
TT    Pt would like a refill of his pravastatin (PRAVACHOL) 40 MG tablet [[id number]]   Sent to the [facility] on [address]   PH:  [phone]    Thank You  Kacy ENRIQUEZ

## 2022-05-16 ENCOUNTER — APPOINTMENT (OUTPATIENT)
Dept: CARDIOLOGY | Facility: MEDICAL CENTER | Age: 87
End: 2022-05-16
Payer: MEDICARE

## 2022-05-17 RX ORDER — SPIRONOLACTONE 25 MG/1
TABLET ORAL
Qty: 90 TABLET | Refills: 0 | Status: SHIPPED | OUTPATIENT
Start: 2022-05-17 | End: 2022-08-04 | Stop reason: SDUPTHER

## 2022-06-01 ENCOUNTER — ANTICOAGULATION MONITORING (OUTPATIENT)
Dept: VASCULAR LAB | Facility: MEDICAL CENTER | Age: 87
End: 2022-06-01
Payer: MEDICARE

## 2022-06-01 DIAGNOSIS — D68.51 FACTOR V LEIDEN (HCC): ICD-10-CM

## 2022-06-01 LAB — INR PPP: 2.6 (ref 2–3.5)

## 2022-06-01 NOTE — PROGRESS NOTES
Anticoagulation Summary  As of 2022    INR goal:  2.0-3.0   TTR:  66.3 % (7 y)   INR used for dosin.60 (2022)   Warfarin maintenance plan:  10 mg (5 mg x 2) every Wed, Sat; 7.5 mg (7.5 mg x 1) all other days   Weekly warfarin total:  57.5 mg   Plan last modified:  Saturnino Cha, Pharmacy Intern (2022)   Next INR check:  6/15/2022   Target end date:  Indefinite    Indications    Pulmonary embolism (HCC) [I26.99]  Stroke [434.91] [I63.9]  Factor V Leiden (HCC) [D68.51]             Anticoagulation Episode Summary     INR check location:      Preferred lab:      Send INR reminders to:      Comments:  Alere      Anticoagulation Care Providers     Provider Role Specialty Phone number    Arcenio Bradley, PharmD Responsible          Anticoagulation Patient Findings  Patient Findings     Positives:  Change in medications    Negatives:  Signs/symptoms of thrombosis, Signs/symptoms of bleeding, Laboratory test error suspected, Change in health, Change in alcohol use, Change in activity, Upcoming invasive procedure, Emergency department visit, Upcoming dental procedure, Missed doses, Extra doses, Change in diet/appetite, Hospital admission, Bruising, Other complaints    Comments:  Pt reports doing 10 mg on  and  as opposed to Saturday alone.           Spoke with patient today regarding therapeutic INR of 2.60.  Patient denies any signs/symptoms of bruising or bleeding or any changes in diet and medications.  Instructed patient to call clinic with any questions or concerns.    Pt is not on antiplatelet therapy     Pt is to continue with current warfarin dosing regimen. Pt reports taking 10 mg every WED, and Sat. Chart adjusted accordingly    Follow up in 2 weeks, to reduce risk of adverse events related to this high risk medication,  Warfarin.    Saturnino Cha, Pharmacy Intern

## 2022-07-07 ENCOUNTER — ANTICOAGULATION MONITORING (OUTPATIENT)
Dept: VASCULAR LAB | Facility: MEDICAL CENTER | Age: 87
End: 2022-07-07
Payer: MEDICARE

## 2022-07-07 DIAGNOSIS — D68.51 FACTOR V LEIDEN (HCC): ICD-10-CM

## 2022-07-07 LAB — INR PPP: 2.2 (ref 2–3.5)

## 2022-07-07 NOTE — PROGRESS NOTES
Anticoagulation Summary  As of 2022    INR goal:  2.0-3.0   TTR:  66.8 % (7.1 y)   INR used for dosin.20 (2022)   Warfarin maintenance plan:  10 mg (5 mg x 2) every Wed, Sat; 7.5 mg (7.5 mg x 1) all other days   Weekly warfarin total:  57.5 mg   Plan last modified:  Saturnino Cha, Pharmacy Intern (2022)   Next INR check:  2022   Target end date:  Indefinite    Indications    Pulmonary embolism (HCC) [I26.99]  Stroke [434.91] [I63.9]  Factor V Leiden (HCC) [D68.51]             Anticoagulation Episode Summary     INR check location:      Preferred lab:      Send INR reminders to:      Comments:  Marcial      Anticoagulation Care Providers     Provider Role Specialty Phone number    Arcenio Bradley, PharmD Responsible          Anticoagulation Patient Findings  Patient Findings     Negatives:  Signs/symptoms of thrombosis, Signs/symptoms of bleeding, Laboratory test error suspected, Change in health, Change in alcohol use, Change in activity, Upcoming invasive procedure, Emergency department visit, Upcoming dental procedure, Missed doses, Extra doses, Change in medications, Change in diet/appetite, Hospital admission, Bruising, Other complaints              Spoke with patient today regarding therapeutic INR of 2.2.  Patient denies any signs/symptoms of bruising or bleeding or any changes in diet and medications.  Instructed patient to call clinic with any questions or concerns.    Pt is not on antiplatelet therapy      Pt is to continue with current warfarin dosing regimen.  Follow up in 2 weeks, to reduce risk of adverse events related to this high risk medication,  Warfarin.    Rehana Clark, Pharmacy Intern

## 2022-07-13 DIAGNOSIS — Z79.01 LONG TERM CURRENT USE OF ANTICOAGULANT THERAPY: ICD-10-CM

## 2022-07-13 RX ORDER — WARFARIN SODIUM 5 MG/1
TABLET ORAL
Qty: 180 TABLET | Refills: 1 | Status: SHIPPED | OUTPATIENT
Start: 2022-07-13 | End: 2023-01-10

## 2022-07-30 DIAGNOSIS — E78.00 PURE HYPERCHOLESTEROLEMIA: ICD-10-CM

## 2022-08-01 RX ORDER — PRAVASTATIN SODIUM 40 MG
TABLET ORAL
Qty: 90 TABLET | Refills: 0 | Status: SHIPPED | OUTPATIENT
Start: 2022-08-01 | End: 2022-08-04 | Stop reason: SDUPTHER

## 2022-08-01 NOTE — TELEPHONE ENCOUNTER
Is the patient due for a refill? Yes    Was the patient seen the past year? No    Date of last office visit: 3/3/21    Does the patient have an upcoming appointment?  Yes   If yes, When? 8/4/22    Provider to refill:TT    Does the patients insurance require a 100 day supply?  No

## 2022-08-04 ENCOUNTER — OFFICE VISIT (OUTPATIENT)
Dept: CARDIOLOGY | Facility: MEDICAL CENTER | Age: 87
End: 2022-08-04
Payer: MEDICARE

## 2022-08-04 VITALS
RESPIRATION RATE: 16 BRPM | BODY MASS INDEX: 30.8 KG/M2 | WEIGHT: 220 LBS | HEART RATE: 84 BPM | HEIGHT: 71 IN | OXYGEN SATURATION: 94 % | DIASTOLIC BLOOD PRESSURE: 70 MMHG | SYSTOLIC BLOOD PRESSURE: 110 MMHG

## 2022-08-04 DIAGNOSIS — Z79.01 LONG TERM CURRENT USE OF ANTICOAGULANT THERAPY: ICD-10-CM

## 2022-08-04 DIAGNOSIS — E78.00 PURE HYPERCHOLESTEROLEMIA: ICD-10-CM

## 2022-08-04 DIAGNOSIS — I25.9 ISCHEMIC HEART DISEASE: ICD-10-CM

## 2022-08-04 DIAGNOSIS — D68.51 FACTOR V LEIDEN (HCC): ICD-10-CM

## 2022-08-04 DIAGNOSIS — D68.59 HYPERCOAGULABLE STATE (HCC): ICD-10-CM

## 2022-08-04 DIAGNOSIS — Z79.899 HIGH RISK MEDICATION USE: ICD-10-CM

## 2022-08-04 DIAGNOSIS — I10 HTN (HYPERTENSION), MALIGNANT: ICD-10-CM

## 2022-08-04 PROCEDURE — 99214 OFFICE O/P EST MOD 30 MIN: CPT | Performed by: INTERNAL MEDICINE

## 2022-08-04 RX ORDER — PRAVASTATIN SODIUM 40 MG
TABLET ORAL
Qty: 90 TABLET | Refills: 4 | Status: SHIPPED | OUTPATIENT
Start: 2022-08-04 | End: 2023-10-03

## 2022-08-04 RX ORDER — SPIRONOLACTONE 25 MG/1
25 TABLET ORAL
Qty: 90 TABLET | Refills: 4 | Status: SHIPPED | OUTPATIENT
Start: 2022-08-04

## 2022-08-04 RX ORDER — THIAMINE HCL 100 MG
TABLET ORAL
COMMUNITY

## 2022-08-04 ASSESSMENT — ENCOUNTER SYMPTOMS
SENSORY CHANGE: 0
FALLS: 0
SPEECH CHANGE: 0
CLAUDICATION: 0
BLURRED VISION: 0
HEADACHES: 0
BLOOD IN STOOL: 0
VOMITING: 0
SHORTNESS OF BREATH: 0
EYE DISCHARGE: 0
DIZZINESS: 0
HALLUCINATIONS: 0
ABDOMINAL PAIN: 0
DEPRESSION: 0
COUGH: 0
BRUISES/BLEEDS EASILY: 0
LOSS OF CONSCIOUSNESS: 0
PALPITATIONS: 0
EYE PAIN: 0
DOUBLE VISION: 0
NAUSEA: 0
MYALGIAS: 0
FEVER: 0
ORTHOPNEA: 0
PND: 0
WEIGHT LOSS: 0
CHILLS: 0

## 2022-08-04 NOTE — PROGRESS NOTES
Chief Complaint   Patient presents with   • Hypertension   • Hyperlipidemia       Subjective:   Michael Lockhart is an 87 y.o. male who presents today for cardiac care of CAD (MI in 2003), HTN, HLP, Factor V Leiden, history of PE.      In the past in 05/2019, patient reported of feeling some chest pain at random times. Pressure-like sensation. No shortness of breath. We perform myocardial PET scan stress test was negative for coronary ischemia. LVEF on that test was about 70%.    01/2021 I have independently interpreted and reviewed echocardiogram's actual images with patient which showed normal left ventricular systolic function. No wall motion abnormality. No evidence of pulmonary hypertension. No significant valvular disease.     Still chronic dizziness due to Meniere's disease and visual problems. Had been seen at [facility].     I have independently interpreted and reviewed blood tests results with patient in clinic which shows normal LDL level 89, triglycerides 137, renal and liver function.    Propranolol did not help with tremors.    Past Medical History:   Diagnosis Date   • Acute, but ill-defined, cerebrovascular disease    • CAD (coronary artery disease)    • Chest pain, unspecified     History of recurrent chest pain withput evidence of acive ischemia by thallium   • General symptoms NEC     Fatigue with low blood pressure   • Headache(784.0)     Chronic   • Herpes zoster without mention of complication    • Ischemic cardiomyopathy    • Ischemic heart disease    • Long term (current) use of anticoagulants    • Other abnormal glucose    • Primary hypercoagulable state (HCC)     History of positive Factor V Leiden   • Pulmonary embolism (HCC)     Complication of cardiac catherization   • Pure hypercholesterolemia    • Rotator cuff (capsule) sprain    • Valvular heart disease      Past Surgical History:   Procedure Laterality Date   • ANGIOPLASTY  2003     Family History   Problem Relation Age of Onset   •  Heart Disease Mother    • Arthritis Mother    • Other Sister         overweight   • Other Brother         tremors   • Clotting Disorder Brother         Factor V     Social History     Socioeconomic History   • Marital status:      Spouse name: Not on file   • Number of children: Not on file   • Years of education: Not on file   • Highest education level: Not on file   Occupational History   • Not on file   Tobacco Use   • Smoking status: Never Smoker   • Smokeless tobacco: Never Used   Vaping Use   • Vaping Use: Never used   Substance and Sexual Activity   • Alcohol use: No   • Drug use: No   • Sexual activity: Not on file   Other Topics Concern   • Not on file   Social History Narrative   • Not on file     Social Determinants of Health     Financial Resource Strain: Not on file   Food Insecurity: Not on file   Transportation Needs: Not on file   Physical Activity: Not on file   Stress: Not on file   Social Connections: Not on file   Intimate Partner Violence: Not on file   Housing Stability: Not on file     Allergies   Allergen Reactions   • Finasteride Swelling     Hand swelling, stopped medications after taking for three days   • Zetia [Ezetimibe]      N/V     Outpatient Encounter Medications as of 8/4/2022   Medication Sig Dispense Refill   • Magnesium 500 MG Tab Take  by mouth.     • pravastatin (PRAVACHOL) 40 MG tablet TAKE 1 TABLET BY MOUTH EVERY DAY 90 Tablet 0   • warfarin (COUMADIN) 5 MG Tab Take two tablets by mouth daily (alternating with 7.5mg tablet) or as directed by anticoagulation clinic 180 Tablet 1   • spironolactone (ALDACTONE) 25 MG Tab TAKE 1 TABLET BY MOUTH EVERY DAY 90 Tablet 0   • carbidopa-levodopa (SINEMET)  MG Tab TAKE 1 TABLET BY MOUTH TWICE A DAY     • warfarin (COUMADIN) 7.5 MG Tab TAKE 1 TABLET DAILY OR AS DIRECTED BY COUMADIN CLINIC 90 Tab 4   • nitroGLYCERIN (NITROSTAT) 0.3 MG SL tablet Place 1 Tab under tongue 1 time daily as needed for Chest Pain. 100 Tab 2   •  "vitamin D (CHOLECALCIFEROL) 1000 UNIT TABS Take 5,000 Units by mouth every day.     • B Complex Vitamins (VITAMIN B COMPLEX PO) Take 1 Tab by mouth every day.     • pramipexole (MIRAPEX) 0.25 MG Tab TAKE 1 TABLET BY MOUTH TWICE DAILY FOR 30 DAYS (Patient not taking: Reported on 8/4/2022)  1     No facility-administered encounter medications on file as of 8/4/2022.     Review of Systems   Constitutional: Negative for chills, fever, malaise/fatigue and weight loss.   HENT: Negative for ear discharge, ear pain, hearing loss and nosebleeds.    Eyes: Negative for blurred vision, double vision, pain and discharge.   Respiratory: Negative for cough and shortness of breath.    Cardiovascular: Negative for chest pain, palpitations, orthopnea, claudication, leg swelling and PND.   Gastrointestinal: Negative for abdominal pain, blood in stool, melena, nausea and vomiting.   Genitourinary: Negative for dysuria and hematuria.   Musculoskeletal: Negative for falls, joint pain and myalgias.   Skin: Negative for itching and rash.   Neurological: Negative for dizziness, sensory change, speech change, loss of consciousness and headaches.   Endo/Heme/Allergies: Negative for environmental allergies. Does not bruise/bleed easily.   Psychiatric/Behavioral: Negative for depression, hallucinations and suicidal ideas.        Objective:   BP (!) 0/0 (BP Location: Left arm, Patient Position: Sitting, BP Cuff Size: Adult)   Ht 1.803 m (5' 11\")   Wt 99.8 kg (220 lb)   BMI 30.68 kg/m²     Physical Exam  Vitals and nursing note reviewed.   Constitutional:       General: He is not in acute distress.     Appearance: He is not diaphoretic.   HENT:      Head: Normocephalic and atraumatic.      Right Ear: External ear normal.      Left Ear: External ear normal.   Eyes:      General:         Right eye: No discharge.         Left eye: No discharge.   Neck:      Thyroid: No thyromegaly.      Vascular: No JVD.   Cardiovascular:      Rate and Rhythm: " Normal rate and regular rhythm.      Comments: Ausculation was not performed to minimize the risk of COVID spread during current pandemic. This complies with Medicare policies and guidelines.    Pulmonary:      Effort: No respiratory distress.   Abdominal:      General: There is no distension.      Tenderness: There is no abdominal tenderness.   Skin:     General: Skin is warm and dry.   Neurological:      Mental Status: He is alert and oriented to person, place, and time.      Cranial Nerves: No cranial nerve deficit.   Psychiatric:         Behavior: Behavior normal.         Assessment:     1. Pure hypercholesterolemia     2. HTN (hypertension), malignant     3. Ischemic heart disease     4. Factor V Leiden (HCC)     5. Hypercoagulable state (HCC)     6. Long term current use of anticoagulant therapy     7. High risk medication use         Medical Decision Making:  Today's Assessment / Status / Plan:   Palpitations:  NO more Metoprolol 25 mg bid.  NO more Propranalol 40 mg tid for benign tremors. Did not work.     Hypertension:  Blood pressure is well controlled.  Lisinopril 10 mg as needed.  Will start Spironolactone for swelling as well.     Hyperlipidemia:  Continue Pravastatin 40 mg po daily.    Chest pain has some atypical features.     Continue blood thinner with coumadin for Factor V Leiden.     I will see patient back in clinic with lab tests and studies results in 12 months.     I thank you for referring patient to our Cardiology Clinic today.

## 2022-08-05 LAB
BUN SERPL-MCNC: 24 MG/DL (ref 8–27)
BUN/CREAT SERPL: 21 (ref 10–24)
CALCIUM SERPL-MCNC: 9.5 MG/DL (ref 8.6–10.2)
CHLORIDE SERPL-SCNC: 103 MMOL/L (ref 96–106)
CO2 SERPL-SCNC: 23 MMOL/L (ref 20–29)
CREAT SERPL-MCNC: 1.17 MG/DL (ref 0.76–1.27)
EGFRCR SERPLBLD CKD-EPI 2021: 61 ML/MIN/1.73
GLUCOSE SERPL-MCNC: 93 MG/DL (ref 65–99)
POTASSIUM SERPL-SCNC: 4.5 MMOL/L (ref 3.5–5.2)
SODIUM SERPL-SCNC: 142 MMOL/L (ref 134–144)

## 2022-08-09 ENCOUNTER — ANTICOAGULATION MONITORING (OUTPATIENT)
Dept: VASCULAR LAB | Facility: MEDICAL CENTER | Age: 87
End: 2022-08-09
Payer: MEDICARE

## 2022-08-09 DIAGNOSIS — D68.51 FACTOR V LEIDEN (HCC): ICD-10-CM

## 2022-08-09 DIAGNOSIS — I63.9 CEREBROVASCULAR ACCIDENT (CVA), UNSPECIFIED MECHANISM (HCC): ICD-10-CM

## 2022-08-09 DIAGNOSIS — I26.99 PULMONARY EMBOLISM, UNSPECIFIED CHRONICITY, UNSPECIFIED PULMONARY EMBOLISM TYPE, UNSPECIFIED WHETHER ACUTE COR PULMONALE PRESENT (HCC): ICD-10-CM

## 2022-08-09 LAB — INR PPP: 2.7 (ref 2–3.5)

## 2022-08-09 NOTE — PROGRESS NOTES
Anticoagulation Summary  As of 2022    INR goal:  2.0-3.0   TTR:  67.2 % (7.2 y)   INR used for dosin.70 (2022)   Warfarin maintenance plan:  10 mg (5 mg x 2) every Wed, Sat; 7.5 mg (7.5 mg x 1) all other days   Weekly warfarin total:  57.5 mg   Plan last modified:  Saturnino Cha, Pharmacy Intern (2022)   Next INR check:  2022   Target end date:  Indefinite    Indications    Pulmonary embolism (HCC) [I26.99]  Stroke [434.91] [I63.9]  Factor V Leiden (HCC) [D68.51]             Anticoagulation Episode Summary     INR check location:      Preferred lab:      Send INR reminders to:      Comments:  Alere      Anticoagulation Care Providers     Provider Role Specialty Phone number    Arcenio Bradley, PharmD Responsible            Refer to Anticoagulation Patient Findings for HPI      Spoke with patient to report a therapeutic INR.      Pt instructed to continue with current warfarin dosing regimen, confirms dosing.   Will follow up in 4 week(s).     Angie Leung

## 2022-09-12 ENCOUNTER — ANTICOAGULATION MONITORING (OUTPATIENT)
Dept: VASCULAR LAB | Facility: MEDICAL CENTER | Age: 87
End: 2022-09-12
Payer: MEDICARE

## 2022-09-12 DIAGNOSIS — D68.51 FACTOR V LEIDEN (HCC): ICD-10-CM

## 2022-09-12 DIAGNOSIS — I26.99 PULMONARY EMBOLISM, UNSPECIFIED CHRONICITY, UNSPECIFIED PULMONARY EMBOLISM TYPE, UNSPECIFIED WHETHER ACUTE COR PULMONALE PRESENT (HCC): ICD-10-CM

## 2022-09-12 DIAGNOSIS — I63.9 CEREBROVASCULAR ACCIDENT (CVA), UNSPECIFIED MECHANISM (HCC): ICD-10-CM

## 2022-09-12 LAB — INR PPP: 3 (ref 2–3.5)

## 2022-09-12 NOTE — PROGRESS NOTES
OP   Telephone Anticoagulation Service Note      Anticoagulation Summary  As of 9/12/2022      INR goal:  2.0-3.0   TTR:  67.6 % (7.3 y)   INR used for dosing:  3.00 (9/12/2022)   Warfarin maintenance plan:  10 mg (5 mg x 2) every Wed, Sat; 7.5 mg (7.5 mg x 1) all other days   Weekly warfarin total:  57.5 mg   No change documented:  Amalia Woods   Plan last modified:  Saturnino Cha, Pharmacy Intern (6/1/2022)   Next INR check:  10/10/2022   Target end date:  Indefinite    Indications    Pulmonary embolism (HCC) [I26.99]  Stroke [434.91] [I63.9]  Factor V Leiden (HCC) [D68.51]                 Anticoagulation Episode Summary       INR check location:      Preferred lab:      Send INR reminders to:      Comments:  Alere          Anticoagulation Care Providers       Provider Role Specialty Phone number    Arcenio Bradley, PharmD Responsible            Anticoagulation Patient Findings  Patient Findings       Negatives:  Signs/symptoms of thrombosis, Signs/symptoms of bleeding, Laboratory test error suspected, Change in health, Change in alcohol use, Change in activity, Upcoming invasive procedure, Emergency department visit, Upcoming dental procedure, Missed doses, Extra doses, Change in medications, Change in diet/appetite, Hospital admission, Bruising, Other complaints          Spoke with the patient on the phone today, reporting a therapeutic INR of 3.0.   Confirmed the current warfarin dosing regimen and patient compliance.  Patient denies any interval changes to diet and/or medications. Patient denies any signs/symptoms of bleeding or clotting.  Patient instructed to continue with the current warfarin dosing regimen, and asked to follow up again in 4 weeks.     Erick Woods  PharmD

## 2022-10-19 ENCOUNTER — ANTICOAGULATION MONITORING (OUTPATIENT)
Dept: VASCULAR LAB | Facility: MEDICAL CENTER | Age: 87
End: 2022-10-19
Payer: MEDICARE

## 2022-10-19 DIAGNOSIS — I63.9 CEREBROVASCULAR ACCIDENT (CVA), UNSPECIFIED MECHANISM (HCC): ICD-10-CM

## 2022-10-19 DIAGNOSIS — I26.99 PULMONARY EMBOLISM, UNSPECIFIED CHRONICITY, UNSPECIFIED PULMONARY EMBOLISM TYPE, UNSPECIFIED WHETHER ACUTE COR PULMONALE PRESENT (HCC): ICD-10-CM

## 2022-10-19 DIAGNOSIS — D68.51 FACTOR V LEIDEN (HCC): ICD-10-CM

## 2022-10-19 LAB — INR PPP: 2.8 (ref 2–3.5)

## 2022-10-20 NOTE — PROGRESS NOTES
Anticoagulation Summary  As of 10/19/2022      INR goal:  2.0-3.0   TTR:  68.0 % (7.4 y)   INR used for dosin.80 (10/19/2022)   Warfarin maintenance plan:  10 mg (5 mg x 2) every Wed, Sat; 7.5 mg (7.5 mg x 1) all other days   Weekly warfarin total:  57.5 mg   Plan last modified:  Saturnino Cha, Pharmacy Intern (2022)   Next INR check:  2022   Target end date:  Indefinite    Indications    Pulmonary embolism (HCC) [I26.99]  Stroke [434.91] [I63.9]  Factor V Leiden (HCC) [D68.51]                 Anticoagulation Episode Summary       INR check location:      Preferred lab:      Send INR reminders to:      Comments:  Marcial          Anticoagulation Care Providers       Provider Role Specialty Phone number    Arcenio Bradley, PharmD Responsible            Anticoagulation Patient Findings    Spoke with patient today regarding therapeutic INR of 2.8.  Patient denies any signs/symptoms of bruising or bleeding or any changes in diet and medications.  Instructed patient to call clinic with any questions or concerns.    Pt is not on antiplatelet therapy    Pt is to continue with current warfarin dosing regimen.  Follow up in 4 weeks, to reduce risk of adverse events related to this high risk medication,  Warfarin.    Lamont Will, LawrenceD, BCACP

## 2022-11-25 LAB — INR PPP: 2.1 (ref 2–3.5)

## 2022-11-28 ENCOUNTER — ANTICOAGULATION MONITORING (OUTPATIENT)
Dept: VASCULAR LAB | Facility: MEDICAL CENTER | Age: 87
End: 2022-11-28
Payer: MEDICARE

## 2022-11-28 DIAGNOSIS — I26.99 PULMONARY EMBOLISM, UNSPECIFIED CHRONICITY, UNSPECIFIED PULMONARY EMBOLISM TYPE, UNSPECIFIED WHETHER ACUTE COR PULMONALE PRESENT (HCC): ICD-10-CM

## 2022-11-28 DIAGNOSIS — D68.51 FACTOR V LEIDEN (HCC): ICD-10-CM

## 2022-11-28 DIAGNOSIS — I63.9 CEREBROVASCULAR ACCIDENT (CVA), UNSPECIFIED MECHANISM (HCC): ICD-10-CM

## 2022-11-29 NOTE — PROGRESS NOTES
Anticoagulation Summary  As of 2022      INR goal:  2.0-3.0   TTR:  68.5 % (7.5 y)   INR used for dosin.10 (2022)   Warfarin maintenance plan:  10 mg (5 mg x 2) every Wed, Sat; 7.5 mg (7.5 mg x 1) all other days; Starting 2022   Weekly warfarin total:  57.5 mg   Plan last modified:  Saturnino Cha, Pharmacy Intern (2022)   Next INR check:  2022   Target end date:  Indefinite    Indications    Pulmonary embolism (HCC) [I26.99]  Stroke [434.91] [I63.9]  Factor V Leiden (HCC) [D68.51]                 Anticoagulation Episode Summary       INR check location:      Preferred lab:      Send INR reminders to:      Comments:  Marcial          Anticoagulation Care Providers       Provider Role Specialty Phone number    Arcenio Bradley, PharmD Responsible              Refer to Anticoagulation Patient Findings for HPI  Patient Findings       Negatives:  Signs/symptoms of thrombosis, Signs/symptoms of bleeding, Laboratory test error suspected, Change in health, Change in alcohol use, Change in activity, Upcoming invasive procedure, Emergency department visit, Upcoming dental procedure, Missed doses, Extra doses, Change in medications, Change in diet/appetite, Hospital admission, Bruising, Other complaints            Spoke with patient to report a therapeutic INR.      Pt is NOT on antiplatelet therapy     Pt instructed to continue with current warfarin dosing regimen, confirms dosing.   Will follow up in 2 week(s).     Coy Castillo, NasT

## 2022-12-19 NOTE — PROGRESS NOTES
OP Anticoagulation Telephone Note    Date: 1/4/2018  Anticoagulation Summary  As of 1/4/2018    INR goal:   2.0-3.0   TTR:   64.4 % (2.6 y)   Today's INR:   2.2 (1/3/2018)   Maintenance plan:   10 mg (5 mg x 2) on Wed, Sat; 7.5 mg (7.5 mg x 1) all other days   Weekly total:   57.5 mg   No change documented:   Dayan Currie, Med Ass't   Plan last modified:   Susy Dominique A.P.NBradley (6/16/2015)   Next INR check:   1/31/2018   Target end date:   Indefinite    Indications    Pulmonary embolism (CMS-HCC) [I26.99]  Stroke [434.91] [I63.9]             Anticoagulation Episode Summary     INR check location:       Preferred lab:       Send INR reminders to:       Comments:   Marcial MCDANIEL      Anticoagulation Care Providers     Provider Role Specialty Phone number    Adolfo Nguyen M.D. Referring Cardiology [phone]    Susy Dominique A.P.N. Responsible Cardiology [phone]    SAMUEL Darling Responsible Cardiology [phone]        Anticoagulation Patient Findings  Patient Findings     Negatives:   Signs/symptoms of thrombosis, Signs/symptoms of bleeding, Laboratory test error suspected, Change in health, Change in alcohol use, Change in activity, Upcoming invasive procedure, Emergency department visit, Upcoming dental procedure, Missed doses, Extra doses, Change in medications, Change in diet/appetite, Hospital admission, Bruising, Other complaints      Plan:  Left message on patient's answering machine/ voicemail. Instructed patient to call back with any concerns regarding any unusual bleeding or bruising, any medication or diet changes, or any signs or symptoms of thrombosis. Instructed patient to resume medication as outlined above. Patient to follow up in 4 weeks.     Dayan Currie, Medical Assistant    Agree with plan of care as stated above.    Susy CALDERON       DISPLAY PLAN FREE TEXT

## 2023-01-10 DIAGNOSIS — Z79.01 LONG TERM CURRENT USE OF ANTICOAGULANT THERAPY: ICD-10-CM

## 2023-01-10 RX ORDER — WARFARIN SODIUM 5 MG/1
TABLET ORAL
Qty: 180 TABLET | Refills: 1 | Status: SHIPPED | OUTPATIENT
Start: 2023-01-10

## 2023-01-12 ENCOUNTER — ANTICOAGULATION MONITORING (OUTPATIENT)
Dept: VASCULAR LAB | Facility: MEDICAL CENTER | Age: 88
End: 2023-01-12
Payer: MEDICARE

## 2023-01-12 ENCOUNTER — APPOINTMENT (RX ONLY)
Dept: URBAN - METROPOLITAN AREA CLINIC 4 | Facility: CLINIC | Age: 88
Setting detail: DERMATOLOGY
End: 2023-01-12

## 2023-01-12 DIAGNOSIS — D68.51 FACTOR V LEIDEN (HCC): ICD-10-CM

## 2023-01-12 DIAGNOSIS — L57.0 ACTINIC KERATOSIS: ICD-10-CM

## 2023-01-12 DIAGNOSIS — D22 MELANOCYTIC NEVI: ICD-10-CM

## 2023-01-12 DIAGNOSIS — L82.1 OTHER SEBORRHEIC KERATOSIS: ICD-10-CM

## 2023-01-12 DIAGNOSIS — D18.0 HEMANGIOMA: ICD-10-CM

## 2023-01-12 DIAGNOSIS — L98.8 OTHER SPECIFIED DISORDERS OF THE SKIN AND SUBCUTANEOUS TISSUE: ICD-10-CM

## 2023-01-12 DIAGNOSIS — L81.4 OTHER MELANIN HYPERPIGMENTATION: ICD-10-CM

## 2023-01-12 PROBLEM — D22.61 MELANOCYTIC NEVI OF RIGHT UPPER LIMB, INCLUDING SHOULDER: Status: ACTIVE | Noted: 2023-01-12

## 2023-01-12 PROBLEM — D22.39 MELANOCYTIC NEVI OF OTHER PARTS OF FACE: Status: ACTIVE | Noted: 2023-01-12

## 2023-01-12 PROBLEM — D22.5 MELANOCYTIC NEVI OF TRUNK: Status: ACTIVE | Noted: 2023-01-12

## 2023-01-12 PROBLEM — D22.62 MELANOCYTIC NEVI OF LEFT UPPER LIMB, INCLUDING SHOULDER: Status: ACTIVE | Noted: 2023-01-12

## 2023-01-12 PROBLEM — D18.01 HEMANGIOMA OF SKIN AND SUBCUTANEOUS TISSUE: Status: ACTIVE | Noted: 2023-01-12

## 2023-01-12 LAB — INR PPP: 2.9 (ref 2–3.5)

## 2023-01-12 PROCEDURE — 17003 DESTRUCT PREMALG LES 2-14: CPT

## 2023-01-12 PROCEDURE — ? COUNSELING

## 2023-01-12 PROCEDURE — ? LIQUID NITROGEN

## 2023-01-12 PROCEDURE — 17000 DESTRUCT PREMALG LESION: CPT

## 2023-01-12 PROCEDURE — 99203 OFFICE O/P NEW LOW 30 MIN: CPT | Mod: 25

## 2023-01-12 ASSESSMENT — LOCATION ZONE DERM
LOCATION ZONE: EAR
LOCATION ZONE: ARM
LOCATION ZONE: LIP
LOCATION ZONE: FACE
LOCATION ZONE: TRUNK

## 2023-01-12 ASSESSMENT — LOCATION DETAILED DESCRIPTION DERM
LOCATION DETAILED: LEFT INFERIOR MEDIAL FOREHEAD
LOCATION DETAILED: LEFT SUPERIOR HELIX
LOCATION DETAILED: LEFT PROXIMAL POSTERIOR UPPER ARM
LOCATION DETAILED: LEFT MID-UPPER BACK
LOCATION DETAILED: INFERIOR MID FOREHEAD
LOCATION DETAILED: RIGHT SUPERIOR HELIX
LOCATION DETAILED: RIGHT DISTAL POSTERIOR UPPER ARM
LOCATION DETAILED: LEFT MEDIAL UPPER BACK
LOCATION DETAILED: SUPERIOR THORACIC SPINE
LOCATION DETAILED: LEFT DISTAL POSTERIOR UPPER ARM
LOCATION DETAILED: LEFT INFERIOR VERMILION LIP
LOCATION DETAILED: RIGHT INFERIOR CRUS OF ANTIHELIX
LOCATION DETAILED: RIGHT PROXIMAL POSTERIOR UPPER ARM
LOCATION DETAILED: INFERIOR THORACIC SPINE

## 2023-01-12 ASSESSMENT — LOCATION SIMPLE DESCRIPTION DERM
LOCATION SIMPLE: LEFT UPPER BACK
LOCATION SIMPLE: LEFT FOREHEAD
LOCATION SIMPLE: LEFT EAR
LOCATION SIMPLE: INFERIOR FOREHEAD
LOCATION SIMPLE: UPPER BACK
LOCATION SIMPLE: RIGHT UPPER ARM
LOCATION SIMPLE: RIGHT EAR
LOCATION SIMPLE: LEFT UPPER ARM
LOCATION SIMPLE: LEFT LIP

## 2023-01-12 NOTE — PROGRESS NOTES
Anticoagulation Summary  As of 2023      INR goal:  2.0-3.0   TTR:  69.0 % (7.6 y)   INR used for dosin.90 (2023)   Warfarin maintenance plan:  10 mg (5 mg x 2) every Wed, Sat; 7.5 mg (7.5 mg x 1) all other days   Weekly warfarin total:  57.5 mg   Plan last modified:  Saturnino Cha, Pharmacy Intern (2022)   Next INR check:  2023   Target end date:  Indefinite    Indications    Pulmonary embolism (HCC) [I26.99]  Stroke [434.91] [I63.9]  Factor V Leiden (HCC) [D68.51]                 Anticoagulation Episode Summary       INR check location:      Preferred lab:      Send INR reminders to:      Comments:  Alere          Anticoagulation Care Providers       Provider Role Specialty Phone number    Arcenio Bradley, PharmD Responsible            Anticoagulation Patient Findings          HPI:  Michael Lockhart, on anticoagulation therapy with warfarin for PE.   Changes to current medical/health status since last appt: none  Denies signs/symptoms of bleeding and/or thrombosis since the last appt.    Denies any interval changes to diet  Denies any interval changes to medications since last appt.   Denies any complications or cost restrictions with current therapy.     A/P   INR  therapeutic.   Pt is to continue with current warfarin dosing regimen.     Next INR in 4 week(s).    Arcenio Bradley, PharmD

## 2023-01-12 NOTE — PROCEDURE: LIQUID NITROGEN
Number Of Freeze-Thaw Cycles: 1 freeze-thaw cycle
Render Note In Bullet Format When Appropriate: No
Post-Care Instructions: I reviewed with the patient in detail post-care instructions. Patient is to wear sunprotection, and avoid picking at any of the treated lesions. Pt may apply Vaseline to crusted or scabbing areas.
Show Aperture Variable?: Yes
Consent: The patient's consent was obtained including but not limited to risks of crusting, scabbing, blistering, scarring, darker or lighter pigmentary change, recurrence, incomplete removal and infection.
Detail Level: Detailed
Duration Of Freeze Thaw-Cycle (Seconds): 5

## 2023-02-09 ENCOUNTER — ANTICOAGULATION MONITORING (OUTPATIENT)
Dept: VASCULAR LAB | Facility: MEDICAL CENTER | Age: 88
End: 2023-02-09
Payer: MEDICARE

## 2023-02-09 DIAGNOSIS — D68.51 FACTOR V LEIDEN (HCC): ICD-10-CM

## 2023-02-09 DIAGNOSIS — I63.9 CEREBROVASCULAR ACCIDENT (CVA), UNSPECIFIED MECHANISM (HCC): ICD-10-CM

## 2023-02-09 DIAGNOSIS — I26.99 PULMONARY EMBOLISM, UNSPECIFIED CHRONICITY, UNSPECIFIED PULMONARY EMBOLISM TYPE, UNSPECIFIED WHETHER ACUTE COR PULMONALE PRESENT (HCC): ICD-10-CM

## 2023-02-09 LAB — INR PPP: 3 (ref 2–3.5)

## 2023-02-10 NOTE — PROGRESS NOTES
Attempted to call pt regarding therapeutic INR - no answer or option to LVM.     Will try back at a later time.    Norman Uribe, Kasey, BCACP    2/10/22-     Anticoagulation Summary  As of 2/9/2023      INR goal:  2.0-3.0   TTR:  69.3 % (7.7 y)   INR used for dosing:  3.00 (2/9/2023)   Warfarin maintenance plan:  10 mg (5 mg x 2) every Wed, Sat; 7.5 mg (7.5 mg x 1) all other days   Weekly warfarin total:  57.5 mg   Plan last modified:  Saturnino Cha, Pharmacy Intern (6/1/2022)   Next INR check:  3/10/2023   Target end date:  Indefinite    Indications    Pulmonary embolism (HCC) [I26.99]  Stroke [434.91] [I63.9]  Factor V Leiden (HCC) [D68.51]                 Anticoagulation Episode Summary       INR check location:      Preferred lab:      Send INR reminders to:      Comments:  Alere          Anticoagulation Care Providers       Provider Role Specialty Phone number    Arcenio Bradley, PharmD Responsible            Anticoagulation Patient Findings          HPI:  Michael Lockhart, on anticoagulation therapy with warfarin for stroke.   Changes to current medical/health status since last appt: none  Denies signs/symptoms of bleeding and/or thrombosis since the last appt.    Denies any interval changes to diet  Denies any interval changes to medications since last appt.   Denies any complications or cost restrictions with current therapy.     A/P   INR  therapeutic.   Pt is to continue with current warfarin dosing regimen.     Next INR in 4 week(s).    Arcenio Bradley, LawrenceD

## 2023-04-06 ENCOUNTER — ANTICOAGULATION MONITORING (OUTPATIENT)
Dept: VASCULAR LAB | Facility: MEDICAL CENTER | Age: 88
End: 2023-04-06
Payer: MEDICARE

## 2023-04-06 DIAGNOSIS — I63.9 CEREBROVASCULAR ACCIDENT (CVA), UNSPECIFIED MECHANISM (HCC): ICD-10-CM

## 2023-04-06 DIAGNOSIS — D68.51 FACTOR V LEIDEN (HCC): ICD-10-CM

## 2023-04-06 DIAGNOSIS — I26.99 PULMONARY EMBOLISM, UNSPECIFIED CHRONICITY, UNSPECIFIED PULMONARY EMBOLISM TYPE, UNSPECIFIED WHETHER ACUTE COR PULMONALE PRESENT (HCC): ICD-10-CM

## 2023-04-06 LAB — INR PPP: 2.4 (ref 2–3.5)

## 2023-04-06 NOTE — PROGRESS NOTES
OP Anticoagulation Service Note    Date: 2023    Anticoagulation Summary  As of 2023      INR goal:  2.0-3.0   TTR:  70.0 % (7.9 y)   INR used for dosin.40 (2023)   Warfarin maintenance plan:  10 mg (5 mg x 2) every Wed, Sat; 7.5 mg (7.5 mg x 1) all other days   Weekly warfarin total:  57.5 mg   Plan last modified:  Saturnino Cha, Pharmacy Intern (2022)   Next INR check:  2023   Target end date:  Indefinite    Indications    Pulmonary embolism (HCC) [I26.99]  Stroke [434.91] [I63.9]  Factor V Leiden (HCC) [D68.51]                 Anticoagulation Episode Summary       INR check location:      Preferred lab:      Send INR reminders to:      Comments:  Alere          Anticoagulation Care Providers       Provider Role Specialty Phone number    Arcenio Bradley, PharmD Responsible            Anticoagulation Patient Findings        Patient's preferred phone number:  [phone]        HPI:   The reason for today's call is to prevent morbidity and mortality from a blood clot and/or stroke and to reduce the risk of bleeding while on a anticoagulant.     PCP:  Delores Michael M.D.  [address]  [place] [address]    Assessment:     INR  therapeutic.     Lab Results   Component Value Date/Time    BUN 24 2022 09:18 AM    BUN 21 2014 03:09 AM    CREATININE 1.17 2022 09:18 AM    CREATININE 0.99 2014 03:09 AM    BUNCREATRAT 21 2022 09:18 AM     Lab Results   Component Value Date/Time    HEMOGLOBIN 13.4 (L) 2014 03:10 AM    HEMATOCRIT 39.3 (L) 2014 03:10 AM    PLATELETCT 175 2014 03:10 AM    ALKPHOSPHAT 25 (L) 2021 07:19 AM    ALKPHOSPHAT 18 (L) 2012 09:14 AM    ASTSGOT 15 2021 07:19 AM    ASTSGOT 20 2012 09:14 AM    ALTSGPT 16 2021 07:19 AM    ALTSGPT 23 2012 09:14 AM          Current Outpatient Medications:     warfarin, TAKE TWO TABLETS BY MOUTH DAILY (ALTERNATING WITH 7.5MG TABLET) OR AS DIRECTED BY  ANTICOAGULATION CLINIC    Magnesium, Take  by mouth.    spironolactone, 25 mg, Oral, QDAY    pravastatin, TAKE 1 TABLET BY MOUTH EVERY DAY    carbidopa-levodopa, TAKE 1 TABLET BY MOUTH TWICE A DAY    warfarin, TAKE 1 TABLET DAILY OR AS DIRECTED BY COUMADIN CLINIC    nitroGLYCERIN, 0.3 mg, Sublingual, QDAY PRN    vitamin D, 5,000 Units, Oral, DAILY    B Complex Vitamins (VITAMIN B COMPLEX PO), 1 Tablet, Oral, DAILY      Plan:     Continue the same warfarin dose, as noted above.       Follow-up:     As seen above      Additional information discussed with patient:     Asked patient to please call the anticoagulation clinic if they have any signs/symptoms of bleeding and/or thrombosis or any changes to diet or medications.      National recommendations regarding anticoagulation therapy:     The CHEST guidelines recommends frequent INR monitoring at regular intervals (a few days up to a max of 12 weeks) to ensure patients are on the proper dose of warfarin, and patients are not having any complications from therapy.  INRs can dramatically change over a short time period due to diet, medications, and medical conditions.         [facility]  Phone: [phone]  Fax: [phone]  On call: [phone]  General scheduling/information [phone]  For emergencies please dial [phone]  Please do not use [email or web address] for urgent matters, call the phone numbers listed above.    This note was created using voice recognition software (Dragon). The accuracy of the dictation is limited by the abilities of the software. I have reviewed the note prior to signing, however some errors in grammar and context are still possible. If you have any questions related to this note please do not hesitate to contact our office.

## 2023-04-17 DIAGNOSIS — Z79.01 LONG TERM CURRENT USE OF ANTICOAGULANT THERAPY: ICD-10-CM

## 2023-04-17 RX ORDER — WARFARIN SODIUM 7.5 MG/1
TABLET ORAL
Qty: 90 TABLET | Refills: 1 | Status: SHIPPED | OUTPATIENT
Start: 2023-04-17 | End: 2023-07-20

## 2023-05-13 LAB — INR PPP: 2.8 (ref 2–3.5)

## 2023-05-15 ENCOUNTER — ANTICOAGULATION MONITORING (OUTPATIENT)
Dept: MEDICAL GROUP | Facility: PHYSICIAN GROUP | Age: 88
End: 2023-05-15
Payer: MEDICARE

## 2023-05-15 DIAGNOSIS — I26.99 PULMONARY EMBOLISM, UNSPECIFIED CHRONICITY, UNSPECIFIED PULMONARY EMBOLISM TYPE, UNSPECIFIED WHETHER ACUTE COR PULMONALE PRESENT (HCC): ICD-10-CM

## 2023-05-15 DIAGNOSIS — D68.51 FACTOR V LEIDEN (HCC): ICD-10-CM

## 2023-05-15 DIAGNOSIS — I63.9 CEREBROVASCULAR ACCIDENT (CVA), UNSPECIFIED MECHANISM (HCC): ICD-10-CM

## 2023-05-15 NOTE — PROGRESS NOTES
OP   Telephone Anticoagulation Service Note      Anticoagulation Summary  As of 5/15/2023      INR goal:  2.0-3.0   TTR:  70.3 % (8 y)   INR used for dosin.80 (2023)   Warfarin maintenance plan:  10 mg (5 mg x 2) every Wed, Sat; 7.5 mg (7.5 mg x 1) all other days   Weekly warfarin total:  57.5 mg   Plan last modified:  Saturnino Cha, Pharmacy Intern (2022)   Next INR check:  2023   Target end date:  Indefinite    Indications    Pulmonary embolism (HCC) [I26.99]  Stroke [434.91] [I63.9]  Factor V Leiden (HCC) [D68.51]                 Anticoagulation Episode Summary       INR check location:      Preferred lab:      Send INR reminders to:      Comments:  Marcial          Anticoagulation Care Providers       Provider Role Specialty Phone number    Arcenio Bradley, PharmD Responsible            Anticoagulation Patient Findings  Patient Findings       Negatives:  Signs/symptoms of thrombosis, Signs/symptoms of bleeding, Laboratory test error suspected, Change in health, Change in alcohol use, Change in activity, Upcoming invasive procedure, Emergency department visit, Upcoming dental procedure, Missed doses, Extra doses, Change in medications, Change in diet/appetite, Hospital admission, Bruising, Other complaints          Spoke with the patient on the phone today, reporting a therapeutic INR of 2.8.  Confirmed the current warfarin dosing regimen and patient compliance.  Patient denies any interval changes to diet and/or medications. Patient denies any signs/symptoms of bleeding or clotting.  Patient instructed to continue with the current warfarin dosing regimen, and asked to follow up again in 4 weeks.     Erick BravoD

## 2023-06-16 ENCOUNTER — ANTICOAGULATION MONITORING (OUTPATIENT)
Dept: VASCULAR LAB | Facility: MEDICAL CENTER | Age: 88
End: 2023-06-16
Payer: MEDICARE

## 2023-06-16 DIAGNOSIS — I63.9 CEREBROVASCULAR ACCIDENT (CVA), UNSPECIFIED MECHANISM (HCC): ICD-10-CM

## 2023-06-16 DIAGNOSIS — D68.51 FACTOR V LEIDEN (HCC): ICD-10-CM

## 2023-06-16 DIAGNOSIS — I26.99 PULMONARY EMBOLISM, UNSPECIFIED CHRONICITY, UNSPECIFIED PULMONARY EMBOLISM TYPE, UNSPECIFIED WHETHER ACUTE COR PULMONALE PRESENT (HCC): ICD-10-CM

## 2023-06-16 LAB — INR PPP: 3.7 (ref 2–3.5)

## 2023-06-16 NOTE — PROGRESS NOTES
Anticoagulation Summary  As of 6/16/2023      INR goal:  2.0-3.0   TTR:  69.8 % (8.1 y)   INR used for dosing:  3.70 (6/16/2023)   Warfarin maintenance plan:  10 mg (5 mg x 2) every Wed, Sat; 7.5 mg (7.5 mg x 1) all other days   Weekly warfarin total:  57.5 mg   Plan last modified:  Saturnino Cha, Pharmacy Intern (6/1/2022)   Next INR check:  6/30/2023   Target end date:  Indefinite    Indications    Pulmonary embolism (HCC) [I26.99]  Stroke [434.91] [I63.9]  Factor V Leiden (HCC) [D68.51]                 Anticoagulation Episode Summary       INR check location:      Preferred lab:      Send INR reminders to:      Comments:  Alejanette          Anticoagulation Care Providers       Provider Role Specialty Phone number    Arcenio Bradley, PharmD Responsible              Refer to Anticoagulation Patient Findings for HPI  Patient Findings       Negatives:  Signs/symptoms of thrombosis, Signs/symptoms of bleeding, Laboratory test error suspected, Change in health, Change in alcohol use, Change in activity, Upcoming invasive procedure, Emergency department visit, Upcoming dental procedure, Missed doses, Extra doses, Change in medications, Change in diet/appetite, Hospital admission, Bruising, Other complaints            Spoke with pt.  INR is SUPRA therapeutic.     Pt verifies warfarin weekly dosing.     Will have pt HOLD x 1 dose today and then continue on w/ his current regimen.    Repeat INR in 2 week(s).     Norman Uribe, PharmD, BCACP

## 2023-07-21 ENCOUNTER — DOCUMENTATION (OUTPATIENT)
Dept: VASCULAR LAB | Facility: MEDICAL CENTER | Age: 88
End: 2023-07-21
Payer: MEDICARE

## 2023-07-22 LAB — INR PPP: 3 (ref 2–3.5)

## 2023-07-24 ENCOUNTER — ANTICOAGULATION MONITORING (OUTPATIENT)
Dept: VASCULAR LAB | Facility: MEDICAL CENTER | Age: 88
End: 2023-07-24
Payer: MEDICARE

## 2023-07-24 DIAGNOSIS — I26.99 PULMONARY EMBOLISM, UNSPECIFIED CHRONICITY, UNSPECIFIED PULMONARY EMBOLISM TYPE, UNSPECIFIED WHETHER ACUTE COR PULMONALE PRESENT (HCC): ICD-10-CM

## 2023-07-24 DIAGNOSIS — I63.9 CEREBROVASCULAR ACCIDENT (CVA), UNSPECIFIED MECHANISM (HCC): ICD-10-CM

## 2023-07-24 DIAGNOSIS — D68.51 FACTOR V LEIDEN (HCC): ICD-10-CM

## 2023-07-24 NOTE — PROGRESS NOTES
Anticoagulation Summary  As of 7/24/2023      INR goal:  2.0-3.0   TTR:  69.0 % (8.2 y)   INR used for dosing:  3.00 (7/22/2023)   Warfarin maintenance plan:  10 mg (5 mg x 2) every Wed, Sat; 7.5 mg (7.5 mg x 1) all other days   Weekly warfarin total:  57.5 mg   Plan last modified:  Saturnino Cha, Pharmacy Intern (6/1/2022)   Next INR check:  8/7/2023   Target end date:  Indefinite    Indications    Pulmonary embolism (HCC) [I26.99]  Stroke [434.91] [I63.9]  Factor V Leiden (HCC) [D68.51]                 Anticoagulation Episode Summary       INR check location:      Preferred lab:      Send INR reminders to:      Comments:  Alere          Anticoagulation Care Providers       Provider Role Specialty Phone number    Arcenio Bradley, PharmD Responsible            Anticoagulation Patient Findings      Left voicemail to report a therapeutic INR.      Pt to continue with current warfarin dosing regimen. Requested pt contact the clinic for any s/s of unusual bleeding, bruising, clotting or any changes to diet or medication.    FU INR in 2 week(s).    Shivani Knott, Pharmacy Intern

## 2023-08-26 LAB — INR PPP: 2.3 (ref 2–3.5)

## 2023-08-28 ENCOUNTER — ANTICOAGULATION MONITORING (OUTPATIENT)
Dept: MEDICAL GROUP | Facility: PHYSICIAN GROUP | Age: 88
End: 2023-08-28
Payer: MEDICARE

## 2023-08-28 DIAGNOSIS — I63.9 CEREBROVASCULAR ACCIDENT (CVA), UNSPECIFIED MECHANISM (HCC): ICD-10-CM

## 2023-08-28 DIAGNOSIS — I26.99 PULMONARY EMBOLISM, UNSPECIFIED CHRONICITY, UNSPECIFIED PULMONARY EMBOLISM TYPE, UNSPECIFIED WHETHER ACUTE COR PULMONALE PRESENT (HCC): ICD-10-CM

## 2023-08-28 DIAGNOSIS — D68.51 FACTOR V LEIDEN (HCC): ICD-10-CM

## 2023-08-28 NOTE — PROGRESS NOTES
Anticoagulation Summary  As of 2023      INR goal:  2.0-3.0   TTR:  69.3 % (8.3 y)   INR used for dosin.30 (2023)   Warfarin maintenance plan:  10 mg (5 mg x 2) every Wed, Sat; 7.5 mg (7.5 mg x 1) all other days   Weekly warfarin total:  57.5 mg   Plan last modified:  Saturnino Cha, Pharmacy Intern (2022)   Next INR check:  2023   Target end date:  Indefinite    Indications    Pulmonary embolism (HCC) [I26.99]  Stroke [434.91] [I63.9]  Factor V Leiden (HCC) [D68.51]                 Anticoagulation Episode Summary       INR check location:      Preferred lab:      Send INR reminders to:      Comments:  Marcial          Anticoagulation Care Providers       Provider Role Specialty Phone number    Arcenio Bradley, PharmD Responsible              Refer to Anticoagulation Patient Findings for HPI  Patient Findings       Negatives:  Signs/symptoms of thrombosis, Signs/symptoms of bleeding, Laboratory test error suspected, Change in health, Change in alcohol use, Change in activity, Upcoming invasive procedure, Emergency department visit, Upcoming dental procedure, Missed doses, Extra doses, Change in medications, Change in diet/appetite, Hospital admission, Bruising, Other complaints            Spoke with patient to report a therapeutic INR.      Pt is NOT on antiplatelet therapy.    Pt instructed to continue with current warfarin dosing regimen, confirms dosing.   Will follow up in 2 week(s).     Rehana Ann, Student

## 2023-10-02 DIAGNOSIS — E78.00 PURE HYPERCHOLESTEROLEMIA: ICD-10-CM

## 2023-10-03 RX ORDER — PRAVASTATIN SODIUM 40 MG
TABLET ORAL
Qty: 90 TABLET | Refills: 0 | Status: SHIPPED | OUTPATIENT
Start: 2023-10-03

## 2023-10-21 LAB — INR PPP: 2.3 (ref 2–3.5)

## 2023-10-23 ENCOUNTER — ANTICOAGULATION MONITORING (OUTPATIENT)
Dept: MEDICAL GROUP | Facility: PHYSICIAN GROUP | Age: 88
End: 2023-10-23
Payer: MEDICARE

## 2023-10-23 DIAGNOSIS — D68.51 FACTOR V LEIDEN (HCC): ICD-10-CM

## 2023-10-23 DIAGNOSIS — I26.99 PULMONARY EMBOLISM, UNSPECIFIED CHRONICITY, UNSPECIFIED PULMONARY EMBOLISM TYPE, UNSPECIFIED WHETHER ACUTE COR PULMONALE PRESENT (HCC): ICD-10-CM

## 2023-10-23 DIAGNOSIS — I63.9 CEREBROVASCULAR ACCIDENT (CVA), UNSPECIFIED MECHANISM (HCC): ICD-10-CM

## 2023-10-23 NOTE — PROGRESS NOTES
OP Anticoagulation Service Note    Date: 10/23/2023    Anticoagulation Summary  As of 10/23/2023      INR goal:  2.0-3.0   TTR:  69.9 % (8.4 y)   INR used for dosin.30 (10/21/2023)   Warfarin maintenance plan:  10 mg (5 mg x 2) every Wed, Sat; 7.5 mg (7.5 mg x 1) all other days   Weekly warfarin total:  57.5 mg   Plan last modified:  Saturnino Cha, Pharmacy Intern (2022)   Next INR check:  2023   Target end date:  Indefinite    Indications    Pulmonary embolism (HCC) [I26.99]  Stroke [434.91] [I63.9]  Factor V Leiden (HCC) [D68.51]                 Anticoagulation Episode Summary       INR check location:      Preferred lab:      Send INR reminders to:      Comments:  Alere          Anticoagulation Care Providers       Provider Role Specialty Phone number    Arcenio Bradley, PharmD Responsible            Anticoagulation Patient Findings        Patient's preferred phone number:  [phone]        HPI:   The reason for today's call is to prevent morbidity and mortality from a blood clot and/or stroke and to reduce the risk of bleeding while on a anticoagulant.     PCP:  Delores Michael M.D.  [address]  [place] [address]    Assessment:     INR  therapeutic.     Lab Results   Component Value Date/Time    BUN 24 2022 09:18 AM    BUN 21 2014 03:09 AM    CREATININE 1.17 2022 09:18 AM    CREATININE 0.99 2014 03:09 AM    BUNCREATRAT 21 2022 09:18 AM     Lab Results   Component Value Date/Time    HEMOGLOBIN 13.4 (L) 2014 03:10 AM    HEMATOCRIT 39.3 (L) 2014 03:10 AM    PLATELETCT 175 2014 03:10 AM    ALKPHOSPHAT 25 (L) 2021 07:19 AM    ALKPHOSPHAT 18 (L) 2012 09:14 AM    ASTSGOT 15 2021 07:19 AM    ASTSGOT 20 2012 09:14 AM    ALTSGPT 16 2021 07:19 AM    ALTSGPT 23 2012 09:14 AM          Current Outpatient Medications:     pravastatin, TAKE 1 TABLET BY MOUTH EVERY DAY    warfarin, TAKE 1 TABLET BY MOUTH DAILY OR  AS DIRECTED BY COUMADIN CLINIC    warfarin, TAKE TWO TABLETS BY MOUTH DAILY (ALTERNATING WITH 7.5MG TABLET) OR AS DIRECTED BY ANTICOAGULATION CLINIC    Magnesium, Take  by mouth.    spironolactone, 25 mg, Oral, QDAY    carbidopa-levodopa, TAKE 1 TABLET BY MOUTH TWICE A DAY    nitroGLYCERIN, 0.3 mg, Sublingual, QDAY PRN    vitamin D, 5,000 Units, Oral, DAILY    B Complex Vitamins (VITAMIN B COMPLEX PO), 1 Tablet, Oral, DAILY      Plan:     Continue the same warfarin dose, as noted above.       Follow-up:     As seen above      Additional information discussed with patient:     Asked patient to please call the anticoagulation clinic if they have any signs/symptoms of bleeding and/or thrombosis or any changes to diet or medications.      National recommendations regarding anticoagulation therapy:     The CHEST guidelines recommends frequent INR monitoring at regular intervals (a few days up to a max of 12 weeks) to ensure patients are on the proper dose of warfarin, and patients are not having any complications from therapy.  INRs can dramatically change over a short time period due to diet, medications, and medical conditions.         [facility]  Phone: [phone]  Fax: [phone]  On call: [phone]  General scheduling/information [phone]  For emergencies please dial [phone]  Please do not use [employer] for urgent matters, call the phone numbers listed above.    This note was created using voice recognition software (Dragon). The accuracy of the dictation is limited by the abilities of the software. I have reviewed the note prior to signing, however some errors in grammar and context are still possible. If you have any questions related to this note please do not hesitate to contact our office.

## 2023-11-04 LAB — INR PPP: 2.9 (ref 2–3.5)

## 2023-11-06 ENCOUNTER — ANTICOAGULATION MONITORING (OUTPATIENT)
Dept: VASCULAR LAB | Facility: MEDICAL CENTER | Age: 88
End: 2023-11-06
Payer: MEDICARE

## 2023-11-06 DIAGNOSIS — I26.99 PULMONARY EMBOLISM, UNSPECIFIED CHRONICITY, UNSPECIFIED PULMONARY EMBOLISM TYPE, UNSPECIFIED WHETHER ACUTE COR PULMONALE PRESENT (HCC): ICD-10-CM

## 2023-11-06 DIAGNOSIS — D68.51 FACTOR V LEIDEN (HCC): ICD-10-CM

## 2023-11-06 DIAGNOSIS — I63.9 CEREBROVASCULAR ACCIDENT (CVA), UNSPECIFIED MECHANISM (HCC): ICD-10-CM

## 2023-11-06 NOTE — PROGRESS NOTES
Anticoagulation Summary  As of 2023      INR goal:  2.0-3.0   TTR:  70.0 % (8.5 y)   INR used for dosin.90 (2023)   Warfarin maintenance plan:  10 mg (5 mg x 2) every Wed, Sat; 7.5 mg (7.5 mg x 1) all other days   Weekly warfarin total:  57.5 mg   Plan last modified:  Saturnino Cha, Pharmacy Intern (2022)   Next INR check:  2024   Target end date:  Indefinite    Indications    Pulmonary embolism (HCC) [I26.99]  Stroke [434.91] [I63.9]  Factor V Leiden (HCC) [D68.51]                 Anticoagulation Episode Summary       INR check location:      Preferred lab:      Send INR reminders to:      Comments:  Marcial          Anticoagulation Care Providers       Provider Role Specialty Phone number    Arcenio Bradley, PharmD Responsible              Refer to Anticoagulation Patient Findings for HPI  Patient Findings       Negatives:  Signs/symptoms of thrombosis, Signs/symptoms of bleeding, Laboratory test error suspected, Change in health, Change in alcohol use, Change in activity, Upcoming invasive procedure, Emergency department visit, Upcoming dental procedure, Missed doses, Extra doses, Change in medications, Change in diet/appetite, Hospital admission, Bruising, Other complaints            Spoke with patient over the phone to report a therapeutic 2.90 INR.      Pt is NOT on antiplatelet therapy.    Pt instructed to continue with current warfarin dosing regimen, confirms dosing.   Will follow up in 8 week(s).     Huyen Avalos, Pharmacy Intern

## 2023-12-08 ENCOUNTER — ANTICOAGULATION MONITORING (OUTPATIENT)
Dept: VASCULAR LAB | Facility: MEDICAL CENTER | Age: 88
End: 2023-12-08
Payer: MEDICARE

## 2023-12-08 DIAGNOSIS — I63.9 CEREBROVASCULAR ACCIDENT (CVA), UNSPECIFIED MECHANISM (HCC): ICD-10-CM

## 2023-12-08 DIAGNOSIS — D68.51 FACTOR V LEIDEN (HCC): ICD-10-CM

## 2023-12-08 DIAGNOSIS — I26.99 PULMONARY EMBOLISM, UNSPECIFIED CHRONICITY, UNSPECIFIED PULMONARY EMBOLISM TYPE, UNSPECIFIED WHETHER ACUTE COR PULMONALE PRESENT (HCC): ICD-10-CM

## 2023-12-08 LAB — INR PPP: 2.9 (ref 2–3.5)

## 2023-12-09 NOTE — PROGRESS NOTES
Anticoagulation Summary  As of 2023      INR goal:  2.0-3.0   TTR:  70.4 % (8.6 y)   INR used for dosin.90 (2023)   Warfarin maintenance plan:  10 mg (5 mg x 2) every Wed, Sat; 7.5 mg (7.5 mg x 1) all other days   Weekly warfarin total:  57.5 mg   Plan last modified:  Saturnino Cha, Pharmacy Intern (2022)   Next INR check:  2024   Target end date:  Indefinite    Indications    Pulmonary embolism (HCC) [I26.99]  Stroke [434.91] [I63.9]  Factor V Leiden (HCC) [D68.51]                 Anticoagulation Episode Summary       INR check location:      Preferred lab:      Send INR reminders to:      Comments:  Marcial          Anticoagulation Care Providers       Provider Role Specialty Phone number    Arcenio Bradley, PharmD Responsible              Refer to Anticoagulation Patient Findings for HPI  Patient Findings       Negatives:  Signs/symptoms of thrombosis, Signs/symptoms of bleeding, Laboratory test error suspected, Change in health, Change in alcohol use, Change in activity, Upcoming invasive procedure, Emergency department visit, Upcoming dental procedure, Missed doses, Extra doses, Change in medications, Change in diet/appetite, Hospital admission, Bruising, Other complaints            Spoke with patient to report a therapeutic INR.      Pt instructed to continue with current warfarin dosing regimen, confirms dosing.     Will follow up in 4 week(s).     Norman Uribe, PharmD, BCACP

## 2024-01-13 LAB — INR PPP: 3.5 (ref 2–3.5)

## 2024-01-15 ENCOUNTER — ANTICOAGULATION MONITORING (OUTPATIENT)
Dept: VASCULAR LAB | Facility: MEDICAL CENTER | Age: 89
End: 2024-01-15
Payer: MEDICARE

## 2024-01-15 DIAGNOSIS — I26.99 PULMONARY EMBOLISM, UNSPECIFIED CHRONICITY, UNSPECIFIED PULMONARY EMBOLISM TYPE, UNSPECIFIED WHETHER ACUTE COR PULMONALE PRESENT (HCC): ICD-10-CM

## 2024-01-15 DIAGNOSIS — I63.9 CEREBROVASCULAR ACCIDENT (CVA), UNSPECIFIED MECHANISM (HCC): ICD-10-CM

## 2024-01-15 DIAGNOSIS — D68.51 FACTOR V LEIDEN (HCC): ICD-10-CM

## 2024-01-15 NOTE — PROGRESS NOTES
Anticoagulation Summary  As of 1/15/2024      INR goal:  2.0-3.0   TTR:  69.7 % (8.6 y)   INR used for dosing:  3.50 (1/13/2024)   Warfarin maintenance plan:  10 mg (5 mg x 2) every Wed, Sat; 7.5 mg (7.5 mg x 1) all other days   Weekly warfarin total:  57.5 mg   Plan last modified:  Saturnino Cha, Pharmacy Intern (6/1/2022)   Next INR check:  1/22/2024   Target end date:  Indefinite    Indications    Pulmonary embolism (HCC) [I26.99]  Stroke [434.91] [I63.9]  Factor V Leiden (HCC) [D68.51]                 Anticoagulation Episode Summary       INR check location:      Preferred lab:      Send INR reminders to:      Comments:  Brendare          Anticoagulation Care Providers       Provider Role Specialty Phone number    Arcenio Bradley, PharmD Responsible            Anticoagulation Patient Findings  Patient Findings       Positives:  Missed doses (skipped warfarin dose on Sat), Change in diet/appetite (decreased vit K intake)    Negatives:  Signs/symptoms of thrombosis, Signs/symptoms of bleeding, Laboratory test error suspected, Change in health, Change in alcohol use, Change in activity, Upcoming invasive procedure, Emergency department visit, Upcoming dental procedure, Extra doses, Change in medications, Hospital admission, Bruising, Other complaints            INR is supratherapeutic    Called and spoke to patient.    Warfarin Plan: Continue regimen as listed above since he skipped a dose already.    Next INR in 1 week(s).    Erika Rea, PharmD

## 2024-01-25 DIAGNOSIS — E78.00 PURE HYPERCHOLESTEROLEMIA: ICD-10-CM

## 2024-01-29 RX ORDER — PRAVASTATIN SODIUM 40 MG
TABLET ORAL
Qty: 90 TABLET | Refills: 0 | OUTPATIENT
Start: 2024-01-29

## 2024-01-29 NOTE — TELEPHONE ENCOUNTER
Denied refill request due to  patient needs appointment for further refills and or discontinuation.

## 2024-02-15 ENCOUNTER — ANTICOAGULATION MONITORING (OUTPATIENT)
Dept: VASCULAR LAB | Facility: MEDICAL CENTER | Age: 89
End: 2024-02-15
Payer: MEDICARE

## 2024-02-15 DIAGNOSIS — I63.9 CEREBROVASCULAR ACCIDENT (CVA), UNSPECIFIED MECHANISM (HCC): ICD-10-CM

## 2024-02-15 DIAGNOSIS — I26.99 PULMONARY EMBOLISM, UNSPECIFIED CHRONICITY, UNSPECIFIED PULMONARY EMBOLISM TYPE, UNSPECIFIED WHETHER ACUTE COR PULMONALE PRESENT (HCC): ICD-10-CM

## 2024-02-15 DIAGNOSIS — D68.51 FACTOR V LEIDEN (HCC): ICD-10-CM

## 2024-02-15 LAB — INR PPP: 2.9 (ref 2–3.5)

## 2024-02-15 NOTE — PROGRESS NOTES
Anticoagulation Summary  As of 2/15/2024      INR goal:  2.0-3.0   TTR:  69.2% (8.7 y)   INR used for dosin.90 (2/15/2024)   Warfarin maintenance plan:  10 mg (5 mg x 2) every Wed, Sat; 7.5 mg (7.5 mg x 1) all other days   Weekly warfarin total:  57.5 mg   Plan last modified:  Saturnino Cha, Pharmacy Intern (2022)   Next INR check:  2024   Target end date:  Indefinite    Indications    Pulmonary embolism (HCC) [I26.99]  Stroke [434.91] [I63.9]  Factor V Leiden (HCC) [D68.51]                 Anticoagulation Episode Summary       INR check location:      Preferred lab:      Send INR reminders to:      Comments:  Marcial          Anticoagulation Care Providers       Provider Role Specialty Phone number    Lawrence ArevaloD Responsible              Refer to Anticoagulation Patient Findings for HPI  Patient Findings       Negatives:  Signs/symptoms of thrombosis, Signs/symptoms of bleeding, Laboratory test error suspected, Change in health, Change in alcohol use, Change in activity, Upcoming invasive procedure, Emergency department visit, Upcoming dental procedure, Missed doses, Extra doses, Change in medications, Change in diet/appetite, Hospital admission, Bruising, Other complaints            Spoke with patient to report a therapeutic INR.        Pt instructed to continue with current warfarin dosing regimen, confirms dosing.   Will follow up in 2 week(s).     Lawrence OdenD

## 2024-03-23 LAB — INR PPP: 2.5 (ref 2–3.5)

## 2024-03-25 ENCOUNTER — ANTICOAGULATION MONITORING (OUTPATIENT)
Dept: VASCULAR LAB | Facility: MEDICAL CENTER | Age: 89
End: 2024-03-25
Payer: MEDICARE

## 2024-03-25 DIAGNOSIS — I26.99 PULMONARY EMBOLISM, UNSPECIFIED CHRONICITY, UNSPECIFIED PULMONARY EMBOLISM TYPE, UNSPECIFIED WHETHER ACUTE COR PULMONALE PRESENT (HCC): ICD-10-CM

## 2024-03-25 DIAGNOSIS — I63.9 CEREBROVASCULAR ACCIDENT (CVA), UNSPECIFIED MECHANISM (HCC): ICD-10-CM

## 2024-03-25 DIAGNOSIS — D68.51 FACTOR V LEIDEN (HCC): ICD-10-CM

## 2024-03-25 NOTE — PROGRESS NOTES
Anticoagulation Summary  As of 3/25/2024      INR goal:  2.0-3.0   TTR:  69.6% (8.8 y)   INR used for dosin.50 (3/23/2024)   Warfarin maintenance plan:  10 mg (5 mg x 2) every Wed, Sat; 7.5 mg (7.5 mg x 1) all other days   Weekly warfarin total:  57.5 mg   Plan last modified:  Saturnino Cha, Pharmacy Intern (2022)   Next INR check:  2024   Target end date:  Indefinite    Indications    Pulmonary embolism (HCC) [I26.99]  Stroke [434.91] [I63.9]  Factor V Leiden (HCC) [D68.51]                 Anticoagulation Episode Summary       INR check location:      Preferred lab:      Send INR reminders to:      Comments:  Marcial          Anticoagulation Care Providers       Provider Role Specialty Phone number    Arcenio Bradley, PharmD Responsible              Refer to Anticoagulation Patient Findings for HPI  Patient Findings       Negatives:  Signs/symptoms of thrombosis, Signs/symptoms of bleeding, Laboratory test error suspected, Change in health, Change in alcohol use, Change in activity, Upcoming invasive procedure, Emergency department visit, Upcoming dental procedure, Missed doses, Extra doses, Change in medications, Change in diet/appetite, Hospital admission, Bruising, Other complaints            Spoke with patient to report a therapeutic INR.      Pt is NOT on antiplatelet therapy     Pt instructed to continue with current warfarin dosing regimen, confirms dosing.   Will follow up in 4 week(s).     Lawrence OdenD

## 2024-04-11 DIAGNOSIS — Z79.01 LONG TERM CURRENT USE OF ANTICOAGULANT THERAPY: ICD-10-CM

## 2024-04-11 RX ORDER — WARFARIN SODIUM 7.5 MG/1
TABLET ORAL
Qty: 90 TABLET | Refills: 0 | Status: SHIPPED | OUTPATIENT
Start: 2024-04-11

## 2024-04-25 ENCOUNTER — ANTICOAGULATION MONITORING (OUTPATIENT)
Dept: VASCULAR LAB | Facility: MEDICAL CENTER | Age: 89
End: 2024-04-25
Payer: MEDICARE

## 2024-04-25 DIAGNOSIS — I26.99 PULMONARY EMBOLISM, UNSPECIFIED CHRONICITY, UNSPECIFIED PULMONARY EMBOLISM TYPE, UNSPECIFIED WHETHER ACUTE COR PULMONALE PRESENT (HCC): ICD-10-CM

## 2024-04-25 DIAGNOSIS — I63.9 CEREBROVASCULAR ACCIDENT (CVA), UNSPECIFIED MECHANISM (HCC): ICD-10-CM

## 2024-04-25 DIAGNOSIS — D68.51 FACTOR V LEIDEN (HCC): ICD-10-CM

## 2024-04-25 LAB — INR PPP: 3 (ref 2–3.5)

## 2024-04-25 NOTE — PROGRESS NOTES
Anticoagulation Summary  As of 4/25/2024      INR goal:  2.0-3.0   TTR:  69.9% (8.9 y)   INR used for dosing:  3.00 (4/25/2024)   Warfarin maintenance plan:  10 mg (5 mg x 2) every Wed, Sat; 7.5 mg (7.5 mg x 1) all other days   Weekly warfarin total:  57.5 mg   Plan last modified:  Saturnino Cha, Pharmacy Intern (6/1/2022)   Next INR check:  5/23/2024   Target end date:  Indefinite    Indications    Pulmonary embolism (HCC) [I26.99]  Stroke [434.91] [I63.9]  Factor V Leiden (HCC) [D68.51]                 Anticoagulation Episode Summary       INR check location:      Preferred lab:      Send INR reminders to:      Comments:  Marcial          Anticoagulation Care Providers       Provider Role Specialty Phone number    Arcenio Bradley, PharmD Responsible                Refer to Anticoagulation Patient Findings for HPI  Patient Findings       Negatives:  Signs/symptoms of thrombosis, Signs/symptoms of bleeding, Laboratory test error suspected, Change in health, Change in alcohol use, Change in activity, Upcoming invasive procedure, Emergency department visit, Upcoming dental procedure, Missed doses, Extra doses, Change in medications, Change in diet/appetite, Hospital admission, Bruising, Other complaints            Spoke with patient to report a therapeutic INR.      Pt instructed to continue with current warfarin dosing regimen, confirms dosing.     Will follow up in 4 week(s).     Norman Uribe, PharmD, BCACP

## 2024-05-14 ENCOUNTER — OFFICE VISIT (OUTPATIENT)
Dept: CARDIOLOGY | Facility: MEDICAL CENTER | Age: 89
End: 2024-05-14
Attending: NURSE PRACTITIONER
Payer: MEDICARE

## 2024-05-14 VITALS
BODY MASS INDEX: 30.54 KG/M2 | WEIGHT: 219 LBS | HEART RATE: 83 BPM | RESPIRATION RATE: 16 BRPM | OXYGEN SATURATION: 96 % | SYSTOLIC BLOOD PRESSURE: 120 MMHG | DIASTOLIC BLOOD PRESSURE: 60 MMHG

## 2024-05-14 DIAGNOSIS — Z79.899 HIGH RISK MEDICATION USE: ICD-10-CM

## 2024-05-14 DIAGNOSIS — D68.51 FACTOR V LEIDEN (HCC): ICD-10-CM

## 2024-05-14 DIAGNOSIS — D68.59 HYPERCOAGULABLE STATE (HCC): ICD-10-CM

## 2024-05-14 DIAGNOSIS — I10 HTN (HYPERTENSION), MALIGNANT: ICD-10-CM

## 2024-05-14 DIAGNOSIS — E78.00 PURE HYPERCHOLESTEROLEMIA: ICD-10-CM

## 2024-05-14 PROBLEM — G20.A1 PARKINSON'S DISEASE (HCC): Status: ACTIVE | Noted: 2023-01-04

## 2024-05-14 PROCEDURE — 99214 OFFICE O/P EST MOD 30 MIN: CPT | Performed by: NURSE PRACTITIONER

## 2024-05-14 PROCEDURE — 3078F DIAST BP <80 MM HG: CPT | Performed by: NURSE PRACTITIONER

## 2024-05-14 PROCEDURE — 3074F SYST BP LT 130 MM HG: CPT | Performed by: NURSE PRACTITIONER

## 2024-05-14 RX ORDER — PRAVASTATIN SODIUM 40 MG
TABLET ORAL
Qty: 90 TABLET | Refills: 0 | Status: SHIPPED | OUTPATIENT
Start: 2024-05-14 | End: 2024-05-24

## 2024-05-14 RX ORDER — TRIAMCINOLONE ACETONIDE 1 MG/G
1 CREAM TOPICAL
COMMUNITY

## 2024-05-14 RX ORDER — GABAPENTIN 300 MG/1
300 CAPSULE ORAL 4 TIMES DAILY
COMMUNITY
Start: 2024-04-09

## 2024-05-14 RX ORDER — SPIRONOLACTONE 25 MG/1
25 TABLET ORAL
Qty: 90 TABLET | Refills: 0 | Status: SHIPPED | OUTPATIENT
Start: 2024-05-14

## 2024-05-14 ASSESSMENT — ENCOUNTER SYMPTOMS
TREMORS: 1
CLAUDICATION: 0
COUGH: 0
ABDOMINAL PAIN: 0
PND: 0
ORTHOPNEA: 0
MYALGIAS: 0
DIZZINESS: 0
SENSORY CHANGE: 1
SHORTNESS OF BREATH: 0
HEADACHES: 1
PALPITATIONS: 0
FEVER: 0

## 2024-05-14 NOTE — PROGRESS NOTES
Subjective:   Michael Lockhart is a 89 y.o. male who presents today for follow up on HTN, HLD, Palpitations with his wife, Adelita.    Patient of Dr. Leni Santana was last seen 8/4/2022. No changes were made during his last visit.    Patient feels well, denies chest pain, shortness of breath, palpitations, dizziness/lightheadedness, orthopnea, PND or Edema.      He needs refills for his spironolactone and Pravastatin    Additionally, patient has the following medical problems:    -Parkinson's disease, had a procedure to help with tremors and developed right facial numbness and right LE numbness    -CAD (MI in 2003)    -Hypertension    -Hyperlipidemia    -Factor V Leiden: taking warfarin    -Hx PE    -Ménière's disease, followed by [facility] clinic, no longer following  Past Medical History:   Diagnosis Date    Acute, but ill-defined, cerebrovascular disease     CAD (coronary artery disease)     Chest pain, unspecified     History of recurrent chest pain withput evidence of acive ischemia by thallium    General symptoms NEC     Fatigue with low blood pressure    Headache(784.0)     Chronic    Herpes zoster without mention of complication     Ischemic cardiomyopathy     Ischemic heart disease     Long term (current) use of anticoagulants     Other abnormal glucose     Primary hypercoagulable state (HCC)     History of positive Factor V Leiden    Pulmonary embolism (HCC)     Complication of cardiac catherization    Pure hypercholesterolemia     Rotator cuff (capsule) sprain     Valvular heart disease      Past Surgical History:   Procedure Laterality Date    ANGIOPLASTY  2003     Family History   Problem Relation Age of Onset    Heart Disease Mother     Arthritis Mother     Other Sister         overweight    Other Brother         tremors    Clotting Disorder Brother         Factor V     Social History     Tobacco Use   Smoking Status Never   Smokeless Tobacco Never     Allergies   Allergen Reactions    Finasteride Swelling      Hand swelling, stopped medications after taking for three days    Zetia [Ezetimibe]      N/V     Outpatient Encounter Medications as of 5/14/2024   Medication Sig Dispense Refill    gabapentin (NEURONTIN) 300 MG Cap Take 300 mg by mouth 4 times a day.      spironolactone (ALDACTONE) 25 MG Tab Take 1 Tablet by mouth every day. 90 Tablet 0    pravastatin (PRAVACHOL) 40 MG tablet TAKE 1 TABLET BY MOUTH EVERY DAY 90 Tablet 0    warfarin (COUMADIN) 7.5 MG Tab TAKE 1 TABLET BY MOUTH DAILY OR AS DIRECTED BY COUMADIN CLINIC 90 Tablet 0    warfarin (COUMADIN) 5 MG Tab TAKE TWO TABLETS BY MOUTH DAILY (ALTERNATING WITH 7.5MG TABLET) OR AS DIRECTED BY ANTICOAGULATION CLINIC 180 Tablet 1    Magnesium 500 MG Tab Take  by mouth.      carbidopa-levodopa (SINEMET)  MG Tab 2 Tablets 4 times a day. TAKE 1 TABLET BY MOUTH TWICE A DAY      nitroGLYCERIN (NITROSTAT) 0.3 MG SL tablet Place 1 Tab under tongue 1 time daily as needed for Chest Pain. 100 Tab 2    vitamin D (CHOLECALCIFEROL) 1000 UNIT TABS Take 5,000 Units by mouth every day.      B Complex Vitamins (VITAMIN B COMPLEX PO) Take 1 Tab by mouth every day.      triamcinolone acetonide (KENALOG) 0.1 % Cream Apply 1 Application topically 1 time a day as needed (as needed).      [DISCONTINUED] pravastatin (PRAVACHOL) 40 MG tablet TAKE 1 TABLET BY MOUTH EVERY DAY 90 Tablet 0    [DISCONTINUED] spironolactone (ALDACTONE) 25 MG Tab Take 1 Tablet by mouth every day. 90 Tablet 4     No facility-administered encounter medications on file as of 5/14/2024.     Review of Systems   Constitutional:  Negative for fever and malaise/fatigue.   Respiratory:  Negative for cough and shortness of breath.    Cardiovascular:  Negative for chest pain, palpitations, orthopnea, claudication, leg swelling and PND.   Gastrointestinal:  Negative for abdominal pain.   Musculoskeletal:  Negative for myalgias.   Neurological:  Positive for tremors, sensory change (right cheek and Right LE) and headaches.  Negative for dizziness.   All other systems reviewed and are negative.       Objective:   /60 (BP Location: Left arm, Patient Position: Sitting, BP Cuff Size: Adult)   Pulse 83   Resp 16   Wt 99.3 kg (219 lb)   SpO2 96%   BMI 30.54 kg/m²     Physical Exam  Vitals and nursing note reviewed.   Constitutional:       Appearance: He is well-developed.   HENT:      Head: Normocephalic and atraumatic.   Eyes:      Pupils: Pupils are equal, round, and reactive to light.   Neck:      Vascular: No JVD.   Cardiovascular:      Rate and Rhythm: Normal rate and regular rhythm.      Heart sounds: Normal heart sounds. No murmur heard.  Pulmonary:      Effort: Pulmonary effort is normal. No respiratory distress.      Breath sounds: Normal breath sounds. No wheezing or rales.   Abdominal:      General: Bowel sounds are normal.      Palpations: Abdomen is soft.   Musculoskeletal:         General: Normal range of motion.      Cervical back: Normal range of motion and neck supple.      Right lower leg: Edema (1+) present.      Left lower leg: No edema.   Skin:     General: Skin is warm and dry.   Neurological:      General: No focal deficit present.      Mental Status: He is alert and oriented to person, place, and time.   Psychiatric:         Behavior: Behavior normal.             Lab Results   Component Value Date/Time    CHOLSTRLTOT 152 01/19/2021 07:19 AM    CHOLSTRLTOT 147 07/01/2014 03:09 AM    LDL 64 07/09/2019 12:33 PM    LDL 86 07/01/2014 03:09 AM    HDL 39 (L) 01/19/2021 07:19 AM    HDL 31 (A) 07/01/2014 03:09 AM    TRIGLYCERIDE 137 01/19/2021 07:19 AM    TRIGLYCERIDE 150 (H) 07/01/2014 03:09 AM       Lab Results   Component Value Date/Time    SODIUM 142 08/04/2022 09:18 AM    SODIUM 139 07/01/2014 03:09 AM    POTASSIUM 4.5 08/04/2022 09:18 AM    POTASSIUM 4.0 07/01/2014 03:09 AM    CHLORIDE 103 08/04/2022 09:18 AM    CHLORIDE 109 07/01/2014 03:09 AM    CO2 23 08/04/2022 09:18 AM    CO2 24 07/01/2014 03:09 AM     GLUCOSE 93 08/04/2022 09:18 AM    GLUCOSE 104 (H) 07/01/2014 03:09 AM    BUN 24 08/04/2022 09:18 AM    BUN 21 07/01/2014 03:09 AM    CREATININE 1.17 08/04/2022 09:18 AM    CREATININE 0.99 07/01/2014 03:09 AM    BUNCREATRAT 21 08/04/2022 09:18 AM     Lab Results   Component Value Date/Time    ALKPHOSPHAT 25 (L) 01/19/2021 07:19 AM    ALKPHOSPHAT 18 (L) 09/12/2012 09:14 AM    ASTSGOT 15 01/19/2021 07:19 AM    ASTSGOT 20 09/12/2012 09:14 AM    ALTSGPT 16 01/19/2021 07:19 AM    ALTSGPT 23 09/12/2012 09:14 AM    TBILIRUBIN 0.7 01/19/2021 07:19 AM    TBILIRUBIN 0.7 09/12/2012 09:14 AM        PET Stress Test 2/1/17   ELECTROCARDIOGRAPHIC FINDINGS:  At rest patient has sinus rhythm.  With   dipyridamole infusion, first degree AV block was noted, no evidence of   ischemia.     SCINTOGRAPHIC FINDINGS:  The QC data for the scan was reviewed and was within   acceptable limits.  In both stress and rest imaging, there is normal   myocardial perfusion.  No evidence of ischemia or infarction.     GATED WALL MOTION FINDINGS:  By gated test, there is normal regional wall   motion with a measured ejection fraction of 70%.     CONCLUSIONS AND IMPRESSIONS:  Negative or normal PET perfusion imaging for   ischemia.  No evidence of ischemia or infarction.  Normal wall motion and   resting ejection fraction.    Echo 7/1/14  CONCLUSIONS  Normal left ventricular systolic function.  Moderate left ventricular hypertrophy.  Grade II diastolic dysfunction.  Moderately dilated right atrium.  Trace mitral regurgitation.  Aortic annular calcification.  Mild tricuspid regurgitation    Transthoracic Echo Report 5/17/17  Compared to the report of the study done on07/01/2014 there has been no   significant change.  Normal left ventricular systolic function.   No evidence of valvular abnormality based on Doppler evaluation.     Assessment:     1. HTN (hypertension), malignant  Comp Metabolic Panel    Lipid Profile    CBC WITH DIFFERENTIAL     spironolactone (ALDACTONE) 25 MG Tab      2. Pure hypercholesterolemia  Comp Metabolic Panel    Lipid Profile    CBC WITH DIFFERENTIAL    pravastatin (PRAVACHOL) 40 MG tablet      3. Factor V Leiden (HCC)  CBC WITH DIFFERENTIAL      4. Hypercoagulable state (HCC)  CBC WITH DIFFERENTIAL      5. High risk medication use            Medical Decision Making:  Today's Assessment / Status / Plan:   HTN: Stable  -Continue spironolactone 25 mg daily, patient given 90-day supply until labs completed    Hyperlipidemia: Last LDL was 89 on 1/19/2021  -Continue pravastatin 20 mg nightly, patient will only be given a 90-day supply until he completes his labs  -CBC, CMP and lipid panel due soon    Palpitations:  -Symptoms have resolved    Factor V leiden:  -Continue warfarin, followed by the anticoagulation clinic    FU in clinic in 1 year with Dr. Luong. Sooner if needed.    Patient verbalizes understanding and agrees with the plan of care.     PLEASE NOTE: This Note was created using voice recognition Software. I have made every reasonable attempt to correct obvious errors, but I expect that there are errors of grammar and possibly content that I did not discover before finalizing the note

## 2024-05-24 ENCOUNTER — TELEPHONE (OUTPATIENT)
Dept: CARDIOLOGY | Facility: MEDICAL CENTER | Age: 89
End: 2024-05-24
Payer: MEDICARE

## 2024-05-24 DIAGNOSIS — E78.00 PURE HYPERCHOLESTEROLEMIA: ICD-10-CM

## 2024-05-24 RX ORDER — PRAVASTATIN SODIUM 40 MG
TABLET ORAL
Qty: 90 TABLET | Refills: 2 | Status: SHIPPED | OUTPATIENT
Start: 2024-05-24

## 2024-05-24 NOTE — TELEPHONE ENCOUNTER
----- Message from NINFA Ascencio sent at 5/23/2024  6:24 PM PDT -----  Please let patient know that I reviewed his lab tests.  His labs are stable.  His cholesterol level is a little elevated and I would like patient to stop his pravastatin and start atorvastatin 20 mg daily.  Please have him obtain a CMP and lipid pane  l in 6 months

## 2024-05-24 NOTE — TELEPHONE ENCOUNTER
Called and discussed with patient.    Patient refuses atorvastatin,  Patient would like to stay on pravastatin.  Patient has had leg cramps in the past from simvastatin and does not want to change medication.    Patient will repeat labs in 6 months.   Placed lab orders.

## 2024-05-28 ENCOUNTER — ANTICOAGULATION MONITORING (OUTPATIENT)
Dept: VASCULAR LAB | Facility: MEDICAL CENTER | Age: 89
End: 2024-05-28
Payer: MEDICARE

## 2024-05-28 DIAGNOSIS — I63.9 CEREBROVASCULAR ACCIDENT (CVA), UNSPECIFIED MECHANISM (HCC): ICD-10-CM

## 2024-05-28 DIAGNOSIS — I26.99 PULMONARY EMBOLISM, UNSPECIFIED CHRONICITY, UNSPECIFIED PULMONARY EMBOLISM TYPE, UNSPECIFIED WHETHER ACUTE COR PULMONALE PRESENT (HCC): ICD-10-CM

## 2024-05-28 DIAGNOSIS — D68.51 FACTOR V LEIDEN (HCC): ICD-10-CM

## 2024-05-28 LAB — INR PPP: 2.5 (ref 2–3.5)

## 2024-05-28 NOTE — PROGRESS NOTES
OP Anticoagulation Service Note    Date: 2024    Anticoagulation Summary  As of 2024      INR goal:  2.0-3.0   TTR:  70.2% (9 y)   INR used for dosin.50 (2024)   Warfarin maintenance plan:  10 mg (5 mg x 2) every Wed, Sat; 7.5 mg (7.5 mg x 1) all other days   Weekly warfarin total:  57.5 mg   Plan last modified:  Saturnino Cha, Pharmacy Intern (2022)   Next INR check:  2024   Target end date:  Indefinite    Indications    Pulmonary embolism (HCC) [I26.99]  Stroke [434.91] [I63.9]  Factor V Leiden (HCC) [D68.51]                 Anticoagulation Episode Summary       INR check location:      Preferred lab:      Send INR reminders to:      Comments:  Marcial          Anticoagulation Care Providers       Provider Role Specialty Phone number    Arcenio Bradley, PharmD Responsible            Anticoagulation Patient Findings        Patient's preferred phone number:  [phone]        HPI:   The reason for today's call is to prevent morbidity and mortality from a blood clot and/or stroke and to reduce the risk of bleeding while on a anticoagulant.     PCP:  Delores Michael M.D.  [address] [facility] [id number]  [place] [id number]    Assessment:     INR  therapeutic.     Lab Results   Component Value Date/Time    BUN 20 2024 06:47 AM    BUN 21 2014 03:09 AM    CREATININE 0.97 2024 06:47 AM    CREATININE 0.99 2014 03:09 AM    BUNCREATRAT 21 2024 06:47 AM     Lab Results   Component Value Date/Time    HEMOGLOBIN 14.7 2024 06:47 AM    HEMOGLOBIN 13.4 (L) 2014 03:10 AM    HEMATOCRIT 47.2 2024 06:47 AM    HEMATOCRIT 39.3 (L) 2014 03:10 AM    PLATELETCT 204 2024 06:47 AM    PLATELETCT 175 2014 03:10 AM    ALKPHOSPHAT 34 (L) 2024 06:47 AM    ALKPHOSPHAT 18 (L) 2012 09:14 AM    ASTSGOT 22 2024 06:47 AM    ASTSGOT 20 2012 09:14 AM    ALTSGPT 14 2024 06:47 AM    ALTSGPT 23 2012 09:14 AM          Current  Outpatient Medications:     pravastatin, TAKE 1 TABLET BY MOUTH EVERY DAY    gabapentin, 300 mg, Oral, 4XDAY    triamcinolone acetonide, 1 Application, Topical, QDAY PRN    spironolactone, 25 mg, Oral, QDAY    warfarin, TAKE 1 TABLET BY MOUTH DAILY OR AS DIRECTED BY COUMADIN CLINIC    warfarin, TAKE TWO TABLETS BY MOUTH DAILY (ALTERNATING WITH 7.5MG TABLET) OR AS DIRECTED BY ANTICOAGULATION CLINIC    Magnesium, Take  by mouth.    carbidopa-levodopa, 2 Tablet, 4XDAY    nitroGLYCERIN, 0.3 mg, Sublingual, QDAY PRN    vitamin D, 5,000 Units, Oral, DAILY    B Complex Vitamins (VITAMIN B COMPLEX PO), 1 Tablet, Oral, DAILY      Plan:     Continue the same warfarin dose, as noted above.       Follow-up:     As seen above      Additional information discussed with patient:     Asked patient to please call the anticoagulation clinic if they have any signs/symptoms of bleeding and/or thrombosis or any changes to diet or medications.      National recommendations regarding anticoagulation therapy:     The CHEST guidelines recommends frequent INR monitoring at regular intervals (a few days up to a max of 12 weeks) to ensure patients are on the proper dose of warfarin, and patients are not having any complications from therapy.  INRs can dramatically change over a short time period due to diet, medications, and medical conditions.         [facility]  Phone: [phone]  Fax: [phone]  On call: [phone]  General scheduling/information [phone]  For emergencies please dial [phone]  Please do not use [employer] for urgent matters, call the phone numbers listed above.    This note was created using voice recognition software (Dragon). The accuracy of the dictation is limited by the abilities of the software. I have reviewed the note prior to signing, however some errors in grammar and context are still possible. If you have any questions related to this note please do not hesitate to contact  our office.

## 2024-07-02 ENCOUNTER — ANTICOAGULATION MONITORING (OUTPATIENT)
Dept: MEDICAL GROUP | Facility: PHYSICIAN GROUP | Age: 89
End: 2024-07-02
Payer: MEDICARE

## 2024-07-02 ENCOUNTER — ANTICOAGULATION MONITORING (OUTPATIENT)
Dept: VASCULAR LAB | Facility: MEDICAL CENTER | Age: 89
End: 2024-07-02
Payer: MEDICARE

## 2024-07-02 DIAGNOSIS — I63.9 CEREBROVASCULAR ACCIDENT (CVA), UNSPECIFIED MECHANISM (HCC): ICD-10-CM

## 2024-07-02 DIAGNOSIS — D68.51 FACTOR V LEIDEN (HCC): ICD-10-CM

## 2024-07-02 DIAGNOSIS — I26.99 PULMONARY EMBOLISM, UNSPECIFIED CHRONICITY, UNSPECIFIED PULMONARY EMBOLISM TYPE, UNSPECIFIED WHETHER ACUTE COR PULMONALE PRESENT (HCC): ICD-10-CM

## 2024-07-02 LAB — INR PPP: 2.1 (ref 2–3.5)

## 2024-07-12 DIAGNOSIS — Z79.01 LONG TERM CURRENT USE OF ANTICOAGULANT THERAPY: ICD-10-CM

## 2024-07-12 RX ORDER — WARFARIN SODIUM 7.5 MG/1
TABLET ORAL
Qty: 90 TABLET | Refills: 0 | Status: SHIPPED | OUTPATIENT
Start: 2024-07-12

## 2024-07-29 ENCOUNTER — ANTICOAGULATION MONITORING (OUTPATIENT)
Dept: VASCULAR LAB | Facility: MEDICAL CENTER | Age: 89
End: 2024-07-29
Payer: MEDICARE

## 2024-07-29 DIAGNOSIS — I26.99 PULMONARY EMBOLISM, UNSPECIFIED CHRONICITY, UNSPECIFIED PULMONARY EMBOLISM TYPE, UNSPECIFIED WHETHER ACUTE COR PULMONALE PRESENT (HCC): ICD-10-CM

## 2024-07-29 DIAGNOSIS — I63.9 CEREBROVASCULAR ACCIDENT (CVA), UNSPECIFIED MECHANISM (HCC): ICD-10-CM

## 2024-07-29 DIAGNOSIS — D68.51 FACTOR V LEIDEN (HCC): ICD-10-CM

## 2024-07-29 LAB — INR PPP: 2.6 (ref 2–3.5)

## 2024-08-22 DIAGNOSIS — I10 HTN (HYPERTENSION), MALIGNANT: ICD-10-CM

## 2024-08-22 RX ORDER — SPIRONOLACTONE 25 MG/1
25 TABLET ORAL
Qty: 90 TABLET | Refills: 0 | Status: SHIPPED | OUTPATIENT
Start: 2024-08-22

## 2024-09-06 ENCOUNTER — ANTICOAGULATION MONITORING (OUTPATIENT)
Dept: VASCULAR LAB | Facility: MEDICAL CENTER | Age: 89
End: 2024-09-06
Payer: MEDICARE

## 2024-09-06 DIAGNOSIS — D68.51 FACTOR V LEIDEN (HCC): ICD-10-CM

## 2024-09-06 LAB — INR PPP: 2.1 (ref 2–3.5)

## 2024-09-06 NOTE — PROGRESS NOTES
Anticoagulation Summary  As of 2024      INR goal:  2.0-3.0   TTR:  71.0% (9.3 y)   INR used for dosin.10 (2024)   Warfarin maintenance plan:  10 mg (5 mg x 2) every Wed, Sat; 7.5 mg (7.5 mg x 1) all other days   Weekly warfarin total:  57.5 mg   Plan last modified:  Saturnino Cha, Pharmacy Intern (2022)   Next INR check:  10/4/2024   Target end date:  Indefinite    Indications    Pulmonary embolism (HCC) [I26.99]  Stroke [434.91] [I63.9]  Factor V Leiden (HCC) [D68.51]                 Anticoagulation Episode Summary       INR check location:  --    Preferred lab:  --    Send INR reminders to:  --    Comments:  Brendare          Anticoagulation Care Providers       Provider Role Specialty Phone number    Arcenio Bradley, PharmD Responsible            Anticoagulation Patient Findings      INR is therapeutic  Reason(s) for out of range INR today: N/A      Called and spoke to patient.    Pt is not on antiplatelet therapy.    Warfarin Plan: Continue regimen as listed above.    Next INR in 4 week(s).    Fredy Roper, PharmD

## 2024-09-12 ENCOUNTER — APPOINTMENT (RX ONLY)
Dept: URBAN - METROPOLITAN AREA CLINIC 4 | Facility: CLINIC | Age: 89
Setting detail: DERMATOLOGY
End: 2024-09-12

## 2024-09-12 DIAGNOSIS — D22 MELANOCYTIC NEVI: ICD-10-CM

## 2024-09-12 DIAGNOSIS — L82.1 OTHER SEBORRHEIC KERATOSIS: ICD-10-CM

## 2024-09-12 DIAGNOSIS — L81.4 OTHER MELANIN HYPERPIGMENTATION: ICD-10-CM

## 2024-09-12 DIAGNOSIS — L98.8 OTHER SPECIFIED DISORDERS OF THE SKIN AND SUBCUTANEOUS TISSUE: ICD-10-CM

## 2024-09-12 DIAGNOSIS — D18.0 HEMANGIOMA: ICD-10-CM

## 2024-09-12 DIAGNOSIS — L57.0 ACTINIC KERATOSIS: ICD-10-CM

## 2024-09-12 PROBLEM — D18.01 HEMANGIOMA OF SKIN AND SUBCUTANEOUS TISSUE: Status: ACTIVE | Noted: 2024-09-12

## 2024-09-12 PROBLEM — D22.61 MELANOCYTIC NEVI OF RIGHT UPPER LIMB, INCLUDING SHOULDER: Status: ACTIVE | Noted: 2024-09-12

## 2024-09-12 PROBLEM — D22.39 MELANOCYTIC NEVI OF OTHER PARTS OF FACE: Status: ACTIVE | Noted: 2024-09-12

## 2024-09-12 PROBLEM — D22.62 MELANOCYTIC NEVI OF LEFT UPPER LIMB, INCLUDING SHOULDER: Status: ACTIVE | Noted: 2024-09-12

## 2024-09-12 PROBLEM — D22.5 MELANOCYTIC NEVI OF TRUNK: Status: ACTIVE | Noted: 2024-09-12

## 2024-09-12 PROCEDURE — ? LIQUID NITROGEN

## 2024-09-12 PROCEDURE — 17003 DESTRUCT PREMALG LES 2-14: CPT

## 2024-09-12 PROCEDURE — 99213 OFFICE O/P EST LOW 20 MIN: CPT | Mod: 25

## 2024-09-12 PROCEDURE — ? COUNSELING

## 2024-09-12 PROCEDURE — 17000 DESTRUCT PREMALG LESION: CPT

## 2024-09-12 ASSESSMENT — LOCATION DETAILED DESCRIPTION DERM
LOCATION DETAILED: LEFT INFERIOR VERMILION LIP
LOCATION DETAILED: RIGHT PROXIMAL POSTERIOR UPPER ARM
LOCATION DETAILED: RIGHT SUPERIOR CENTRAL MALAR CHEEK
LOCATION DETAILED: LEFT SUPERIOR CENTRAL MALAR CHEEK
LOCATION DETAILED: INFERIOR MID FOREHEAD
LOCATION DETAILED: LEFT PROXIMAL POSTERIOR UPPER ARM
LOCATION DETAILED: LEFT INFERIOR MEDIAL FOREHEAD
LOCATION DETAILED: LEFT MID-UPPER BACK
LOCATION DETAILED: LEFT DISTAL POSTERIOR UPPER ARM
LOCATION DETAILED: RIGHT DISTAL POSTERIOR UPPER ARM
LOCATION DETAILED: LEFT MEDIAL UPPER BACK
LOCATION DETAILED: SUPERIOR THORACIC SPINE
LOCATION DETAILED: INFERIOR THORACIC SPINE

## 2024-09-12 ASSESSMENT — LOCATION SIMPLE DESCRIPTION DERM
LOCATION SIMPLE: RIGHT CHEEK
LOCATION SIMPLE: LEFT LIP
LOCATION SIMPLE: RIGHT UPPER ARM
LOCATION SIMPLE: LEFT UPPER ARM
LOCATION SIMPLE: UPPER BACK
LOCATION SIMPLE: LEFT UPPER BACK
LOCATION SIMPLE: LEFT FOREHEAD
LOCATION SIMPLE: INFERIOR FOREHEAD
LOCATION SIMPLE: LEFT CHEEK

## 2024-09-12 ASSESSMENT — LOCATION ZONE DERM
LOCATION ZONE: ARM
LOCATION ZONE: FACE
LOCATION ZONE: LIP
LOCATION ZONE: TRUNK

## 2024-09-12 NOTE — PROCEDURE: LIQUID NITROGEN
Render Note In Bullet Format When Appropriate: No
Number Of Freeze-Thaw Cycles: 1 freeze-thaw cycle
Duration Of Freeze Thaw-Cycle (Seconds): 5
Post-Care Instructions: I reviewed with the patient in detail post-care instructions. Patient is to wear sunprotection, and avoid picking at any of the treated lesions. Pt may apply Vaseline to crusted or scabbing areas.
Detail Level: Detailed
Show Aperture Variable?: Yes
Consent: The patient's consent was obtained including but not limited to risks of crusting, scabbing, blistering, scarring, darker or lighter pigmentary change, recurrence, incomplete removal and infection.

## 2024-10-09 ENCOUNTER — ANTICOAGULATION MONITORING (OUTPATIENT)
Dept: VASCULAR LAB | Facility: MEDICAL CENTER | Age: 89
End: 2024-10-09
Payer: MEDICARE

## 2024-10-09 DIAGNOSIS — I26.99 PULMONARY EMBOLISM, UNSPECIFIED CHRONICITY, UNSPECIFIED PULMONARY EMBOLISM TYPE, UNSPECIFIED WHETHER ACUTE COR PULMONALE PRESENT (HCC): ICD-10-CM

## 2024-10-09 DIAGNOSIS — D68.51 FACTOR V LEIDEN (HCC): ICD-10-CM

## 2024-10-09 DIAGNOSIS — I63.9 CEREBROVASCULAR ACCIDENT (CVA), UNSPECIFIED MECHANISM (HCC): ICD-10-CM

## 2024-10-09 LAB — INR PPP: 2.6 (ref 2–3.5)

## 2024-10-11 DIAGNOSIS — Z79.01 LONG TERM CURRENT USE OF ANTICOAGULANT THERAPY: ICD-10-CM

## 2024-10-11 DIAGNOSIS — I26.99 PULMONARY EMBOLISM, UNSPECIFIED CHRONICITY, UNSPECIFIED PULMONARY EMBOLISM TYPE, UNSPECIFIED WHETHER ACUTE COR PULMONALE PRESENT (HCC): ICD-10-CM

## 2024-10-11 RX ORDER — WARFARIN SODIUM 7.5 MG/1
TABLET ORAL
Qty: 100 TABLET | Refills: 1 | Status: SHIPPED | OUTPATIENT
Start: 2024-10-11

## 2024-10-21 DIAGNOSIS — I10 HTN (HYPERTENSION), MALIGNANT: ICD-10-CM

## 2024-10-21 RX ORDER — SPIRONOLACTONE 25 MG/1
25 TABLET ORAL
Qty: 90 TABLET | Refills: 0 | Status: SHIPPED | OUTPATIENT
Start: 2024-10-21

## 2024-11-06 ENCOUNTER — OFFICE VISIT (OUTPATIENT)
Dept: CARDIOLOGY | Facility: MEDICAL CENTER | Age: 89
End: 2024-11-06
Attending: INTERNAL MEDICINE
Payer: MEDICARE

## 2024-11-06 VITALS
HEART RATE: 74 BPM | SYSTOLIC BLOOD PRESSURE: 114 MMHG | BODY MASS INDEX: 29.93 KG/M2 | WEIGHT: 213.8 LBS | RESPIRATION RATE: 14 BRPM | DIASTOLIC BLOOD PRESSURE: 66 MMHG | OXYGEN SATURATION: 98 % | HEIGHT: 71 IN

## 2024-11-06 DIAGNOSIS — E78.00 PURE HYPERCHOLESTEROLEMIA: ICD-10-CM

## 2024-11-06 DIAGNOSIS — I25.119 CORONARY ARTERY DISEASE INVOLVING NATIVE CORONARY ARTERY OF NATIVE HEART WITH ANGINA PECTORIS (HCC): ICD-10-CM

## 2024-11-06 DIAGNOSIS — G20.B1 PARKINSON'S DISEASE WITH DYSKINESIA, UNSPECIFIED WHETHER MANIFESTATIONS FLUCTUATE (HCC): ICD-10-CM

## 2024-11-06 DIAGNOSIS — Z79.899 HIGH RISK MEDICATION USE: ICD-10-CM

## 2024-11-06 DIAGNOSIS — D68.51 FACTOR V LEIDEN (HCC): ICD-10-CM

## 2024-11-06 DIAGNOSIS — D68.59 HYPERCOAGULABLE STATE (HCC): ICD-10-CM

## 2024-11-06 DIAGNOSIS — I10 HTN (HYPERTENSION), MALIGNANT: ICD-10-CM

## 2024-11-06 PROCEDURE — 3078F DIAST BP <80 MM HG: CPT | Performed by: INTERNAL MEDICINE

## 2024-11-06 PROCEDURE — 99214 OFFICE O/P EST MOD 30 MIN: CPT | Performed by: INTERNAL MEDICINE

## 2024-11-06 PROCEDURE — 99212 OFFICE O/P EST SF 10 MIN: CPT | Performed by: INTERNAL MEDICINE

## 2024-11-06 PROCEDURE — 3074F SYST BP LT 130 MM HG: CPT | Performed by: INTERNAL MEDICINE

## 2024-11-06 RX ORDER — PRAVASTATIN SODIUM 40 MG
TABLET ORAL
Qty: 90 TABLET | Refills: 4 | Status: SHIPPED | OUTPATIENT
Start: 2024-11-06

## 2024-11-06 ASSESSMENT — ENCOUNTER SYMPTOMS
SENSORY CHANGE: 0
ORTHOPNEA: 0
HEADACHES: 0
VOMITING: 0
EYE PAIN: 0
DIZZINESS: 0
NAUSEA: 0
ABDOMINAL PAIN: 0
FALLS: 0
CHILLS: 0
MYALGIAS: 0
SPEECH CHANGE: 0
COUGH: 0
CLAUDICATION: 0
WEIGHT LOSS: 0
PND: 0
HALLUCINATIONS: 0
DOUBLE VISION: 0
SHORTNESS OF BREATH: 0
LOSS OF CONSCIOUSNESS: 0
PALPITATIONS: 0
BLOOD IN STOOL: 0
FEVER: 0
BLURRED VISION: 0
DEPRESSION: 0
EYE DISCHARGE: 0
BRUISES/BLEEDS EASILY: 0

## 2024-11-06 ASSESSMENT — FIBROSIS 4 INDEX: FIB4 SCORE: 2.54

## 2024-11-06 NOTE — PROGRESS NOTES
Chief Complaint   Patient presents with    Follow-Up     Dx:  Ischemic heart disease    Hyperlipidemia       Subjective:   Michael Lockhart is an 89 y.o. male who presents today for cardiac care of CAD (MI in 2003), HTN, HLP, Factor V Leiden, history of PE.      In the past in 05/2019, patient reported of feeling some chest pain at random times. Pressure-like sensation. No shortness of breath. We perform myocardial PET scan stress test was negative for coronary ischemia. LVEF on that test was about 70%.    01/2021 I have independently interpreted and reviewed echocardiogram's actual images with patient which showed normal left ventricular systolic function. No wall motion abnormality. No evidence of pulmonary hypertension. No significant valvular disease.     Still chronic dizziness due to Meniere's disease and visual problems. Had been seen at [facility].     I have independently interpreted and reviewed blood tests results with patient in clinic which shows normal LDL level 128 up from 89, triglycerides 152 up from 137, renal and liver function. K of 5.2. GFR of 75.    Propranolol did not help with tremors.    He is now reporting of having shoulder / chest pain radiating to his neck.    He is now with Parkinson's disease.    Past Medical History:   Diagnosis Date    Acute, but ill-defined, cerebrovascular disease     CAD (coronary artery disease)     Chest pain, unspecified     History of recurrent chest pain withput evidence of acive ischemia by thallium    General symptoms NEC     Fatigue with low blood pressure    Headache(784.0)     Chronic    Herpes zoster without mention of complication     Ischemic cardiomyopathy     Ischemic heart disease     Long term (current) use of anticoagulants     Other abnormal glucose     Primary hypercoagulable state (HCC)     History of positive Factor V Leiden    Pulmonary embolism (HCC)     Complication of cardiac catherization    Pure hypercholesterolemia     Rotator cuff  (capsule) sprain     Valvular heart disease      Past Surgical History:   Procedure Laterality Date    ANGIOPLASTY  2003     Family History   Problem Relation Age of Onset    Heart Disease Mother     Arthritis Mother     Other Sister         overweight    Other Brother         tremors    Clotting Disorder Brother         Factor V     Social History     Socioeconomic History    Marital status:      Spouse name: Not on file    Number of children: Not on file    Years of education: Not on file    Highest education level: Not on file   Occupational History    Not on file   Tobacco Use    Smoking status: Never    Smokeless tobacco: Never   Vaping Use    Vaping status: Never Used   Substance and Sexual Activity    Alcohol use: No    Drug use: No    Sexual activity: Not on file   Other Topics Concern    Not on file   Social History Narrative    Not on file     Social Drivers of Health     Financial Resource Strain: Not on file   Food Insecurity: Not on file   Transportation Needs: Not on file   Physical Activity: Not on file   Stress: Not on file   Social Connections: Not on file   Intimate Partner Violence: Not on file   Housing Stability: Not on file     Allergies   Allergen Reactions    Finasteride Swelling     Hand swelling, stopped medications after taking for three days    Zetia [Ezetimibe]      N/V     Outpatient Encounter Medications as of 11/6/2024   Medication Sig Dispense Refill    spironolactone (ALDACTONE) 25 MG Tab Take 1 Tablet by mouth every day. 90 Tablet 0    warfarin (COUMADIN) 7.5 MG Tab TAKE 1 TABLET BY MOUTH DAILY OR AS DIRECTED BY COUMADIN CLINIC 100 Tablet 1    pravastatin (PRAVACHOL) 40 MG tablet TAKE 1 TABLET BY MOUTH EVERY DAY 90 Tablet 2    gabapentin (NEURONTIN) 300 MG Cap Take 300 mg by mouth 4 times a day.      warfarin (COUMADIN) 5 MG Tab TAKE TWO TABLETS BY MOUTH DAILY (ALTERNATING WITH 7.5MG TABLET) OR AS DIRECTED BY ANTICOAGULATION CLINIC 180 Tablet 1    Magnesium 500 MG Tab Take   "by mouth 2 times a day.      carbidopa-levodopa (SINEMET)  MG Tab 2 Tablets 4 times a day. TAKE 1 TABLET BY MOUTH TWICE A DAY      nitroGLYCERIN (NITROSTAT) 0.3 MG SL tablet Place 1 Tab under tongue 1 time daily as needed for Chest Pain. 100 Tab 2    vitamin D (CHOLECALCIFEROL) 1000 UNIT TABS Take 5,000 Units by mouth every day.      B Complex Vitamins (VITAMIN B COMPLEX PO) Take 1 Tab by mouth every day.      [DISCONTINUED] triamcinolone acetonide (KENALOG) 0.1 % Cream Apply 1 Application topically 1 time a day as needed (as needed). (Patient not taking: Reported on 11/6/2024)       No facility-administered encounter medications on file as of 11/6/2024.     Review of Systems   Constitutional:  Negative for chills, fever, malaise/fatigue and weight loss.   HENT:  Negative for ear discharge, ear pain, hearing loss and nosebleeds.    Eyes:  Negative for blurred vision, double vision, pain and discharge.   Respiratory:  Negative for cough and shortness of breath.    Cardiovascular:  Negative for chest pain, palpitations, orthopnea, claudication, leg swelling and PND.   Gastrointestinal:  Negative for abdominal pain, blood in stool, melena, nausea and vomiting.   Genitourinary:  Negative for dysuria and hematuria.   Musculoskeletal:  Negative for falls, joint pain and myalgias.   Skin:  Negative for itching and rash.   Neurological:  Negative for dizziness, sensory change, speech change, loss of consciousness and headaches.   Endo/Heme/Allergies:  Negative for environmental allergies. Does not bruise/bleed easily.   Psychiatric/Behavioral:  Negative for depression, hallucinations and suicidal ideas.         Objective:   /66 (BP Location: Left arm, Patient Position: Sitting, BP Cuff Size: Adult)   Pulse 74   Resp 14   Ht 1.803 m (5' 11\")   Wt 97 kg (213 lb 12.8 oz)   SpO2 98%   BMI 29.82 kg/m²     Physical Exam  Vitals and nursing note reviewed.   Constitutional:       General: He is not in acute " distress.     Appearance: He is not diaphoretic.   HENT:      Head: Normocephalic and atraumatic.      Right Ear: External ear normal.      Left Ear: External ear normal.   Eyes:      General:         Right eye: No discharge.         Left eye: No discharge.   Neck:      Thyroid: No thyromegaly.      Vascular: No JVD.   Cardiovascular:      Rate and Rhythm: Normal rate and regular rhythm.      Comments: Ausculation was not performed to minimize the risk of COVID spread during current pandemic. This complies with Medicare policies and guidelines.    Pulmonary:      Effort: No respiratory distress.   Abdominal:      General: There is no distension.      Tenderness: There is no abdominal tenderness.   Skin:     General: Skin is warm and dry.   Neurological:      Mental Status: He is alert and oriented to person, place, and time.      Cranial Nerves: No cranial nerve deficit.   Psychiatric:         Behavior: Behavior normal.         Assessment:     1. Coronary artery disease involving native coronary artery of native heart with angina pectoris (HCC)  EC-ECHOCARDIOGRAM COMPLETE W/O CONT    [id number] PET W/CT ATTENUATION      2. HTN (hypertension), malignant        3. Factor V Leiden (HCC)        4. Pure hypercholesterolemia        5. Hypercoagulable state (HCC)        6. High risk medication use        7. Parkinson's disease with dyskinesia, unspecified whether manifestations fluctuate (HCC)              Medical Decision Making:  Today's Assessment / Status / Plan:   At this time, to further risk stratify, I will order a transthoracic echocardiogram to assess cardiac and valvular functions. I will also order a myocardial PET scan stress test to assess for coronary ischemia.    Palpitations:  NO more Metoprolol 25 mg bid.  NO more Propranalol 40 mg tid for benign tremors. Did not work.     Hypertension:  Blood pressure is well controlled.  Lisinopril 10 mg as needed.  Will start Spironolactone for swelling as  well.     Hyperlipidemia:  Continue Pravastatin 40 mg po daily.     Continue blood thinner with coumadin for Factor V Leiden.     I will see patient back in clinic with lab tests and studies results in 12 months.     I thank you for referring patient to our Cardiology Clinic today.

## 2024-11-11 ENCOUNTER — ANTICOAGULATION MONITORING (OUTPATIENT)
Dept: VASCULAR LAB | Facility: MEDICAL CENTER | Age: 89
End: 2024-11-11
Payer: MEDICARE

## 2024-11-11 DIAGNOSIS — I26.99 PULMONARY EMBOLISM, UNSPECIFIED CHRONICITY, UNSPECIFIED PULMONARY EMBOLISM TYPE, UNSPECIFIED WHETHER ACUTE COR PULMONALE PRESENT (HCC): ICD-10-CM

## 2024-11-11 DIAGNOSIS — I63.9 CEREBROVASCULAR ACCIDENT (CVA), UNSPECIFIED MECHANISM (HCC): ICD-10-CM

## 2024-11-11 DIAGNOSIS — D68.51 FACTOR V LEIDEN (HCC): ICD-10-CM

## 2024-11-11 LAB — INR PPP: 2.7 (ref 2–3.5)

## 2024-11-12 NOTE — PROGRESS NOTES
Anticoagulation Summary  As of 2024      INR goal:  2.0-3.0   TTR:  71.6% (9.5 y)   INR used for dosin.70 (2024)   Warfarin maintenance plan:  10 mg (5 mg x 2) every Wed, Sat; 7.5 mg (7.5 mg x 1) all other days   Weekly warfarin total:  57.5 mg   Plan last modified:  Saturnino Cha, Pharmacy Intern (2022)   Next INR check:  2024   Target end date:  Indefinite    Indications    Pulmonary embolism (HCC) [I26.99]  Stroke [434.91] [I63.9]  Factor V Leiden (HCC) [D68.51]                 Anticoagulation Episode Summary       INR check location:  --    Preferred lab:  --    Send INR reminders to:  --    Comments:  Marcial          Anticoagulation Care Providers       Provider Role Specialty Phone number    Arcenio Bradley, PharmD Responsible            Anticoagulation Patient Findings  Patient Findings       Negatives:  Signs/symptoms of thrombosis, Signs/symptoms of bleeding, Laboratory test error suspected, Change in health, Change in alcohol use, Change in activity, Upcoming invasive procedure, Emergency department visit, Upcoming dental procedure, Missed doses, Extra doses, Change in medications, Change in diet/appetite, Hospital admission, Bruising, Other complaints            INR is therapeutic  Reason(s) for out of range INR today: N/A      Called and spoke to patient.    Pt is not on antiplatelet therapy.    Requested patient to contact the clinic for any s/sx of unusual bleeding, bruising, clotting or any changes to diet or medications. Unless patient reports any changes that would warrant an adjustment to the plan:  Warfarin Plan: Continue regimen as listed above.    Next INR in 4 week(s).    Neeru Little, PharmD

## 2025-01-02 ENCOUNTER — ANTICOAGULATION MONITORING (OUTPATIENT)
Dept: VASCULAR LAB | Facility: MEDICAL CENTER | Age: 89
End: 2025-01-02
Payer: MEDICARE

## 2025-01-02 DIAGNOSIS — D68.51 FACTOR V LEIDEN (HCC): ICD-10-CM

## 2025-01-02 DIAGNOSIS — I26.99 PULMONARY EMBOLISM, UNSPECIFIED CHRONICITY, UNSPECIFIED PULMONARY EMBOLISM TYPE, UNSPECIFIED WHETHER ACUTE COR PULMONALE PRESENT (HCC): ICD-10-CM

## 2025-01-02 DIAGNOSIS — I63.9 CEREBROVASCULAR ACCIDENT (CVA), UNSPECIFIED MECHANISM (HCC): ICD-10-CM

## 2025-01-02 LAB — INR PPP: 2.5 (ref 2–3.5)

## 2025-01-03 NOTE — PROGRESS NOTES
Anticoagulation Summary  As of 2025      INR goal:  2.0-3.0   TTR:  72.0% (9.6 y)   INR used for dosin.50 (2025)   Warfarin maintenance plan:  10 mg (5 mg x 2) every Wed, Sat; 7.5 mg (7.5 mg x 1) all other days   Weekly warfarin total:  57.5 mg   Plan last modified:  Saturnino Cha, Pharmacy Intern (2022)   Next INR check:  2025   Target end date:  Indefinite    Indications    Pulmonary embolism (HCC) [I26.99]  Stroke [434.91] [I63.9]  Factor V Leiden (HCC) [D68.51]                 Anticoagulation Episode Summary       INR check location:  --    Preferred lab:  --    Send INR reminders to:  --    Comments:  Marcial          Anticoagulation Care Providers       Provider Role Specialty Phone number    Arcenio Bradley, PharmD Responsible            Anticoagulation Patient Findings  Patient Findings       Negatives:  Signs/symptoms of thrombosis, Signs/symptoms of bleeding, Laboratory test error suspected, Change in health, Change in alcohol use, Change in activity, Upcoming invasive procedure, Emergency department visit, Upcoming dental procedure, Missed doses, Extra doses, Change in medications, Change in diet/appetite, Hospital admission, Bruising, Other complaints            INR is therapeutic  Reason(s) for out of range INR today: N/A      Called and spoke to patient.    Pt is not on antiplatelet therapy.      Warfarin Plan: Continue regimen as listed above.    Next INR in 6 week(s).    Dianelys Curry, LawrenceD

## 2025-01-08 ENCOUNTER — TELEPHONE (OUTPATIENT)
Dept: CARDIOLOGY | Facility: MEDICAL CENTER | Age: 89
End: 2025-01-08
Payer: MEDICARE

## 2025-01-08 DIAGNOSIS — E78.00 PURE HYPERCHOLESTEROLEMIA: ICD-10-CM

## 2025-01-08 DIAGNOSIS — I25.119 CORONARY ARTERY DISEASE INVOLVING NATIVE CORONARY ARTERY OF NATIVE HEART WITH ANGINA PECTORIS (HCC): ICD-10-CM

## 2025-01-08 DIAGNOSIS — D68.51 FACTOR V LEIDEN (HCC): ICD-10-CM

## 2025-01-08 NOTE — TELEPHONE ENCOUNTER
TT  Caller: Adelita Lockhart     Topic/issue: Patient would like to request a referral for   Dr. Norris Lamar   [place] [address] [place] [address]  [phone]    Callback Number: [phone]    Thank you  Gisell GUSTAFSON

## 2025-02-04 ENCOUNTER — ANTICOAGULATION MONITORING (OUTPATIENT)
Dept: MEDICAL GROUP | Facility: PHYSICIAN GROUP | Age: 89
End: 2025-02-04
Payer: MEDICARE

## 2025-02-04 DIAGNOSIS — I26.99 PULMONARY EMBOLISM, UNSPECIFIED CHRONICITY, UNSPECIFIED PULMONARY EMBOLISM TYPE, UNSPECIFIED WHETHER ACUTE COR PULMONALE PRESENT (HCC): ICD-10-CM

## 2025-02-04 DIAGNOSIS — D68.51 FACTOR V LEIDEN (HCC): ICD-10-CM

## 2025-02-04 DIAGNOSIS — I63.9 CEREBROVASCULAR ACCIDENT (CVA), UNSPECIFIED MECHANISM (HCC): ICD-10-CM

## 2025-02-04 LAB — INR PPP: 3 (ref 2–3.5)

## 2025-02-05 NOTE — PROGRESS NOTES
Anticoagulation Summary  As of 2/4/2025      INR goal:  2.0-3.0   TTR:  72.3% (9.7 y)   INR used for dosing:  3.00 (2/4/2025)   Warfarin maintenance plan:  10 mg (5 mg x 2) every Wed, Sat; 7.5 mg (7.5 mg x 1) all other days   Weekly warfarin total:  57.5 mg   Plan last modified:  Saturnino Cha, Pharmacy Intern (6/1/2022)   Next INR check:  3/4/2025   Target end date:  Indefinite    Indications    Pulmonary embolism (HCC) [I26.99]  Stroke [434.91] [I63.9]  Factor V Leiden (HCC) [D68.51]                 Anticoagulation Episode Summary       INR check location:  --    Preferred lab:  --    Send INR reminders to:  --    Comments:  Marcial          Anticoagulation Care Providers       Provider Role Specialty Phone number    Arcenio Bradley, PharmD Responsible            Anticoagulation Patient Findings  Patient Findings       Negatives:  Signs/symptoms of thrombosis, Signs/symptoms of bleeding, Laboratory test error suspected, Change in health, Change in alcohol use, Change in activity, Upcoming invasive procedure, Emergency department visit, Upcoming dental procedure, Missed doses, Extra doses, Change in medications, Change in diet/appetite, Hospital admission, Bruising, Other complaints            Called and spoke to patient .    INR therapeutic  Reason(s) for out of range INR today: N/A      Warfarin Plan: Continue regimen as listed above.    Next INR in 4 week(s).    Aniket Greer, LawrenceD, BCACP

## 2025-02-14 DIAGNOSIS — I10 HTN (HYPERTENSION), MALIGNANT: ICD-10-CM

## 2025-02-14 RX ORDER — SPIRONOLACTONE 25 MG/1
25 TABLET ORAL
Qty: 90 TABLET | Refills: 0 | Status: SHIPPED | OUTPATIENT
Start: 2025-02-14

## 2025-02-14 NOTE — TELEPHONE ENCOUNTER
Chart reviewed. Last lab completed 5/22/24. BMP ordered. Courtesy refill sent for spironolactone.     Phone Number Called: [phone]     Call outcome: Spoke to patient regarding message below.    Message: Called to update pt on courtesy refill sent and that he needs to get a BMP completed to ensure future refills. Pt states he will be in [place] on Wednesday 2/19/25. He prefers to go to [facility] to have his labs completed. Reassured pt that his order will be faxed to [facility].     =================================    Faxed lab slip to [facility]  Fax #[phone]  Phone #[phone]  Received receipt for confirmation, scanned in to [facility]

## 2025-02-20 DIAGNOSIS — I10 HTN (HYPERTENSION), MALIGNANT: ICD-10-CM

## 2025-02-21 ENCOUNTER — RESULTS FOLLOW-UP (OUTPATIENT)
Dept: CARDIOLOGY | Facility: MEDICAL CENTER | Age: 89
End: 2025-02-21

## 2025-03-12 ENCOUNTER — ANTICOAGULATION MONITORING (OUTPATIENT)
Dept: VASCULAR LAB | Facility: MEDICAL CENTER | Age: 89
End: 2025-03-12
Payer: MEDICARE

## 2025-03-12 DIAGNOSIS — D68.51 FACTOR V LEIDEN (HCC): ICD-10-CM

## 2025-03-12 LAB — INR PPP: 2.6 (ref 2–3.5)

## 2025-03-12 NOTE — PROGRESS NOTES
Anticoagulation Summary  As of 3/12/2025      INR goal:  2.0-3.0   TTR:  72.6% (9.8 y)   INR used for dosin.60 (3/12/2025)   Warfarin maintenance plan:  10 mg (5 mg x 2) every Wed, Sat; 7.5 mg (7.5 mg x 1) all other days   Weekly warfarin total:  57.5 mg   Plan last modified:  Saturnino Cha, Pharmacy Intern (2022)   Next INR check:  2025   Target end date:  Indefinite    Indications    Pulmonary embolism (HCC) [I26.99]  Stroke [434.91] [I63.9]  Factor V Leiden (HCC) [D68.51]                 Anticoagulation Episode Summary       INR check location:  --    Preferred lab:  --    Send INR reminders to:  --    Comments:  Marcial          Anticoagulation Care Providers       Provider Role Specialty Phone number    Arcenio Bradley, PharmD Responsible            Anticoagulation Patient Findings  Patient Findings       Negatives:  Signs/symptoms of thrombosis, Signs/symptoms of bleeding, Laboratory test error suspected, Change in health, Change in alcohol use, Change in activity, Upcoming invasive procedure, Emergency department visit, Upcoming dental procedure, Missed doses, Extra doses, Change in medications, Change in diet/appetite, Hospital admission, Bruising, Other complaints            INR is therapeutic  Reason(s) for out of range INR today: N/A      Called and spoke to patient.    Pt is not on antiplatelet therapy.      Warfarin Plan: Continue regimen as listed above.    Next INR in 4 week(s).    Dianelys Curry, LawrenceD

## 2025-03-26 ENCOUNTER — APPOINTMENT (OUTPATIENT)
Dept: RADIOLOGY | Facility: MEDICAL CENTER | Age: 89
End: 2025-03-26
Attending: INTERNAL MEDICINE
Payer: MEDICARE

## 2025-05-17 DIAGNOSIS — I10 HTN (HYPERTENSION), MALIGNANT: ICD-10-CM

## 2025-05-19 RX ORDER — SPIRONOLACTONE 25 MG/1
25 TABLET ORAL
Qty: 90 TABLET | Refills: 1 | Status: SHIPPED | OUTPATIENT
Start: 2025-05-19

## 2025-06-17 DIAGNOSIS — Z79.01 CHRONIC ANTICOAGULATION: ICD-10-CM

## 2025-07-15 ENCOUNTER — TELEPHONE (OUTPATIENT)
Dept: VASCULAR LAB | Facility: MEDICAL CENTER | Age: 89
End: 2025-07-15
Payer: MEDICARE

## 2025-07-15 NOTE — TELEPHONE ENCOUNTER
Called and spoke to patient's wife. Advised to inform [facility] that pt is currently admitted, however if they discharge him from their service, advised to inform our clinic so that we can resubmit an enrollment form. She verbalized understanding of instructions.    Aniket Greer, LawrenceD, BCACP

## 2025-07-15 NOTE — TELEPHONE ENCOUNTER
BRITTANY    Caller: Adelita (wife)    Topic/issue: MEDICAL ADVICE    Adelita states that pt had been in the hospital and rehab for a while and was not able to test his INR during this time. Adelita states that she has received a notification that pt home testing supplies would be picked up due to not being used properly. However she is headed to the rehab center for a visit and would like to know if she should take the supplies with her to test his INR value. Please advise.    Thank you,  Mark SOLORZANO    Callback Number: [phone]